# Patient Record
Sex: FEMALE | Race: WHITE | NOT HISPANIC OR LATINO | Employment: OTHER | URBAN - METROPOLITAN AREA
[De-identification: names, ages, dates, MRNs, and addresses within clinical notes are randomized per-mention and may not be internally consistent; named-entity substitution may affect disease eponyms.]

---

## 2017-09-22 ENCOUNTER — OFFICE VISIT (OUTPATIENT)
Dept: INTERNAL MEDICINE | Facility: CLINIC | Age: 57
End: 2017-09-22
Attending: INTERNAL MEDICINE
Payer: COMMERCIAL

## 2017-09-22 ENCOUNTER — DOCUMENTATION ONLY (OUTPATIENT)
Dept: INTERNAL MEDICINE | Facility: CLINIC | Age: 57
End: 2017-09-22

## 2017-09-22 VITALS
SYSTOLIC BLOOD PRESSURE: 138 MMHG | HEART RATE: 81 BPM | OXYGEN SATURATION: 97 % | BODY MASS INDEX: 30.47 KG/M2 | HEIGHT: 62 IN | DIASTOLIC BLOOD PRESSURE: 78 MMHG | WEIGHT: 165.56 LBS

## 2017-09-22 DIAGNOSIS — E53.8 B12 DEFICIENCY: ICD-10-CM

## 2017-09-22 DIAGNOSIS — Z12.11 SCREENING FOR COLON CANCER: ICD-10-CM

## 2017-09-22 DIAGNOSIS — R23.9 SKIN CHANGE: ICD-10-CM

## 2017-09-22 DIAGNOSIS — Z00.00 ANNUAL PHYSICAL EXAM: Primary | ICD-10-CM

## 2017-09-22 DIAGNOSIS — Z11.59 ENCOUNTER FOR HEPATITIS C SCREENING TEST FOR LOW RISK PATIENT: ICD-10-CM

## 2017-09-22 DIAGNOSIS — Z12.11 ENCOUNTER FOR FIT (FECAL IMMUNOCHEMICAL TEST) SCREENING: ICD-10-CM

## 2017-09-22 DIAGNOSIS — Z98.84 HISTORY OF ROUX-EN-Y GASTRIC BYPASS: ICD-10-CM

## 2017-09-22 PROCEDURE — 90471 IMMUNIZATION ADMIN: CPT | Mod: S$GLB,,, | Performed by: INTERNAL MEDICINE

## 2017-09-22 PROCEDURE — 90686 IIV4 VACC NO PRSV 0.5 ML IM: CPT | Mod: S$GLB,,, | Performed by: INTERNAL MEDICINE

## 2017-09-22 PROCEDURE — 99386 PREV VISIT NEW AGE 40-64: CPT | Mod: 25,S$GLB,, | Performed by: INTERNAL MEDICINE

## 2017-09-22 PROCEDURE — 99999 PR PBB SHADOW E&M-NEW PATIENT-LVL IV: CPT | Mod: PBBFAC,,, | Performed by: INTERNAL MEDICINE

## 2017-09-22 RX ORDER — CYANOCOBALAMIN 1000 UG/ML
INJECTION, SOLUTION INTRAMUSCULAR; SUBCUTANEOUS
COMMUNITY
Start: 2016-08-29 | End: 2017-10-31 | Stop reason: SDUPTHER

## 2017-09-22 NOTE — PROGRESS NOTES
"Patient was given vaccine information sheet for the Flu Vaccine vaccine. The area of injection was palpated using the acromion process as a landmark. This area was cleaned with alcohol. Using a 25g 1" safety needle, 0.5mL of the vaccine was placed into the left deltoid muscle. The injection site was dressed with a bandage. Patient experienced no complications and was discharged in stable condition. Fluzone vaccine Lot: [id number] Exp: [id number]  "

## 2017-09-22 NOTE — PROGRESS NOTES
"Subjective:   Patient ID: Trupti Coronado is a 57 y.o. female  Chief complaint:   Chief Complaint   Patient presents with    [facility]       HPI  Pt here to est care  Had hx of gastric bypass and lost 188 pounds after this. Moved from [place] to [place] 2014 and gained some weight back.  She and her  recently moved to [place] July 2017 and resumed a regular exercise program and is motivated to improve her health and his weight.    Has a history of B12 deficiency attributed to her history of gastric bypass.  Was on injections in the past. Last b12 injection was in 3-5 months - she would like to check her level to determine if still needs regular injections.  Reports has history of tremor of bilateral hands.  Reports it is progressed over the past few years and now interferes with her handwriting and ability to apply makeup.  Reports it is worse with fine motor activities.  Denies weakness and numbness or pain, gait disturbance, incontinence, dysarthria, dysphagia, facial asymmetry.   Her father had a stroke but no other neurological diseases in the family.     Is taking juice plus vitamins at this time.     Review of Systems    Objective:  Vitals:    09/22/17 1148   BP: 138/78   Pulse: 81   SpO2: 97%   Weight: 75.1 kg (165 lb 9.1 oz)   Height: 5' 2" (1.575 m)     Body mass index is 30.28 kg/m².    Physical Exam   Constitutional: She is oriented to person, place, and time. She appears well-developed and well-nourished.   HENT:   Head: Normocephalic and atraumatic.   Right Ear: External ear normal.   Left Ear: External ear normal.   Nose: Nose normal.   Mouth/Throat: Oropharynx is clear and moist. No oropharyngeal exudate.   No carotid bruits   Eyes: Conjunctivae and EOM are normal.   Neck: Neck supple. No thyromegaly present.   Cardiovascular: Normal rate, regular rhythm, normal heart sounds and intact distal pulses.    Pulmonary/Chest: Effort normal and breath sounds normal.   Abdominal: Soft. " Bowel sounds are normal.   Musculoskeletal: She exhibits no edema or tenderness.   Lymphadenopathy:     She has no cervical adenopathy.   Neurological: She is alert and oriented to person, place, and time.   Fine tremor of bilateral hands with movement  Does not worsen when approaches target    Skin: Skin is warm and dry. Capillary refill takes less than 2 seconds.   Psychiatric: Her behavior is normal. Thought content normal.   Vitals reviewed.    Assessment:  1. Annual physical exam    2. History of Tatyana-en-Y gastric bypass    3. Encounter for hepatitis C screening test for low risk patient    4. Encounter for FIT (fecal immunochemical test) screening    5. Screening for colon cancer    6. Skin change    7. B12 deficiency      Plan:  Trupti was seen today for establish care.    Diagnoses and all orders for this visit:    Annual physical exam  -     Vitamin B12; Future  -     Folate; Future  -     CBC auto differential; Future  -     Comprehensive metabolic panel; Future  -     Ambulatory Referral to Neurology  -     TSH; Future  -     Ferritin; Future  -     Lipid panel; Future  -     Vitamin D; Future  -     Hepatitis C antibody; Future  -     Influenza - Quadrivalent (3 years & older) (PF)  Recommend daily sunscreen, cardiovascular exercise min 30 min 5 days per week. Seatbelts routinely.    History of Tatyana-en-Y gastric bypass  -     Vitamin B12; Future  -     Folate; Future  -     Ferritin; Future  -     Vitamin D; Future  -     Magnesium; Future  -     Iron and TIBC; Future    Encounter for hepatitis C screening test for low risk patient  -     Hepatitis C antibody; Future    Encounter for FIT (fecal immunochemical test) screening  -     Fecal Immunochemical Test (iFOBT); Future    Screening for colon cancer: Declines colonoscopy at this time but will contact Dr. Foy if reconsiders, agrees to fitkit  -     Ambulatory referral to Gastroenterology    Skin change  -     Ambulatory referral to Dermatology  -      Ambulatory Referral to Dermatology    followed by gyn in [place]: Dr. Jen Segundo and ordering mmg in [place]  Normal Bone density 12/2016 and mmg 12/2016  Colonoscopy - did not complete due to poor sedation in past     b12 def: check level - if low then will resume b12 injections  Give flu vaccine today    There are no preventive care reminders to display for this patient.

## 2017-09-23 PROBLEM — E53.8 B12 DEFICIENCY: Status: ACTIVE | Noted: 2017-09-23

## 2017-09-23 PROBLEM — Z98.84 HISTORY OF ROUX-EN-Y GASTRIC BYPASS: Status: ACTIVE | Noted: 2017-09-23

## 2017-09-25 ENCOUNTER — LAB VISIT (OUTPATIENT)
Dept: LAB | Facility: OTHER | Age: 57
End: 2017-09-25
Attending: INTERNAL MEDICINE
Payer: COMMERCIAL

## 2017-09-25 DIAGNOSIS — Z11.59 ENCOUNTER FOR HEPATITIS C SCREENING TEST FOR LOW RISK PATIENT: ICD-10-CM

## 2017-09-25 DIAGNOSIS — Z98.84 HISTORY OF ROUX-EN-Y GASTRIC BYPASS: ICD-10-CM

## 2017-09-25 DIAGNOSIS — Z00.00 ANNUAL PHYSICAL EXAM: ICD-10-CM

## 2017-09-25 LAB
25(OH)D3+25(OH)D2 SERPL-MCNC: 14 NG/ML
ALBUMIN SERPL BCP-MCNC: 3.6 G/DL
ALP SERPL-CCNC: 81 U/L
ALT SERPL W/O P-5'-P-CCNC: 14 U/L
ANION GAP SERPL CALC-SCNC: 8 MMOL/L
AST SERPL-CCNC: 20 U/L
BASOPHILS # BLD AUTO: 0.04 K/UL
BASOPHILS NFR BLD: 0.7 %
BILIRUB SERPL-MCNC: 0.5 MG/DL
BUN SERPL-MCNC: 11 MG/DL
CALCIUM SERPL-MCNC: 9 MG/DL
CHLORIDE SERPL-SCNC: 107 MMOL/L
CHOLEST SERPL-MCNC: 196 MG/DL
CHOLEST/HDLC SERPL: 3 {RATIO}
CO2 SERPL-SCNC: 26 MMOL/L
CREAT SERPL-MCNC: 0.7 MG/DL
DIFFERENTIAL METHOD: ABNORMAL
EOSINOPHIL # BLD AUTO: 0 K/UL
EOSINOPHIL NFR BLD: 0.3 %
ERYTHROCYTE [DISTWIDTH] IN BLOOD BY AUTOMATED COUNT: 13.6 %
EST. GFR  (AFRICAN AMERICAN): >60 ML/MIN/1.73 M^2
EST. GFR  (NON AFRICAN AMERICAN): >60 ML/MIN/1.73 M^2
FERRITIN SERPL-MCNC: 5 NG/ML
FOLATE SERPL-MCNC: 10.4 NG/ML
GLUCOSE SERPL-MCNC: 89 MG/DL
HCT VFR BLD AUTO: 38.7 %
HDLC SERPL-MCNC: 66 MG/DL
HDLC SERPL: 33.7 %
HGB BLD-MCNC: 12.2 G/DL
IRON SERPL-MCNC: 67 UG/DL
LDLC SERPL CALC-MCNC: 113 MG/DL
LYMPHOCYTES # BLD AUTO: 2.3 K/UL
LYMPHOCYTES NFR BLD: 38.3 %
MAGNESIUM SERPL-MCNC: 2.2 MG/DL
MCH RBC QN AUTO: 26.9 PG
MCHC RBC AUTO-ENTMCNC: 31.5 G/DL
MCV RBC AUTO: 85 FL
MONOCYTES # BLD AUTO: 0.4 K/UL
MONOCYTES NFR BLD: 7.2 %
NEUTROPHILS # BLD AUTO: 3.1 K/UL
NEUTROPHILS NFR BLD: 53.2 %
NONHDLC SERPL-MCNC: 130 MG/DL
PLATELET # BLD AUTO: 229 K/UL
PMV BLD AUTO: 10.6 FL
POTASSIUM SERPL-SCNC: 4.3 MMOL/L
PROT SERPL-MCNC: 6.7 G/DL
RBC # BLD AUTO: 4.53 M/UL
SATURATED IRON: 14 %
SODIUM SERPL-SCNC: 141 MMOL/L
TOTAL IRON BINDING CAPACITY: 493 UG/DL
TRANSFERRIN SERPL-MCNC: 333 MG/DL
TRIGL SERPL-MCNC: 85 MG/DL
TSH SERPL DL<=0.005 MIU/L-ACNC: 1.46 UIU/ML
VIT B12 SERPL-MCNC: 594 PG/ML
WBC # BLD AUTO: 5.87 K/UL

## 2017-09-25 PROCEDURE — 82607 VITAMIN B-12: CPT

## 2017-09-25 PROCEDURE — 83540 ASSAY OF IRON: CPT

## 2017-09-25 PROCEDURE — 82746 ASSAY OF FOLIC ACID SERUM: CPT

## 2017-09-25 PROCEDURE — 85025 COMPLETE CBC W/AUTO DIFF WBC: CPT

## 2017-09-25 PROCEDURE — 36415 COLL VENOUS BLD VENIPUNCTURE: CPT

## 2017-09-25 PROCEDURE — 83735 ASSAY OF MAGNESIUM: CPT

## 2017-09-25 PROCEDURE — 86803 HEPATITIS C AB TEST: CPT

## 2017-09-25 PROCEDURE — 82306 VITAMIN D 25 HYDROXY: CPT

## 2017-09-25 PROCEDURE — 82728 ASSAY OF FERRITIN: CPT

## 2017-09-25 PROCEDURE — 84443 ASSAY THYROID STIM HORMONE: CPT

## 2017-09-25 PROCEDURE — 80053 COMPREHEN METABOLIC PANEL: CPT

## 2017-09-25 PROCEDURE — 80061 LIPID PANEL: CPT

## 2017-09-26 LAB — HCV AB SERPL QL IA: NEGATIVE

## 2017-09-27 ENCOUNTER — TELEPHONE (OUTPATIENT)
Dept: INTERNAL MEDICINE | Facility: CLINIC | Age: 57
End: 2017-09-27

## 2017-09-27 DIAGNOSIS — E61.1 IRON DEFICIENCY: ICD-10-CM

## 2017-09-27 DIAGNOSIS — E55.9 VITAMIN D DEFICIENCY: ICD-10-CM

## 2017-09-27 DIAGNOSIS — E53.8 B12 DEFICIENCY: Primary | ICD-10-CM

## 2017-09-27 NOTE — TELEPHONE ENCOUNTER
Please notify pt that labs returned.   Screening for hep c is negative - great news!  Cholesterol is at goal.   Folate, magnesium, Thyroid, blood counts, kidneys, liver, fasting sugar and electrolytes are normal.    Vit d is low - For the Vit D deficiency, I would like you to start high dose vit d which is a pill once WEEKLY for 12 weeks. I'll send this prescription to your pharmacy. Lets repeat your vit d level in 3 months after completing this regimen.  I recommend taking over the counter vitamin D at 1000units daily after completing the prescription to maintain your levels.      Iron is low - no anemia is present. I recommend starting over the counter iron supplements (Fergon or Feosol 325mg) 1-2 times daily with meals to prevent anemia in future. Take this with food to avoid an upset stomach as this can cause nausea. It can also cause constipation and can turn stools black. Taking each dose of iron with an over the counter stool softener called colace 100mg can help prevent constipation. I also recommend to increase dietary sources of iron including dark leafy vegetables like spinach, iron-fortified cereals, breads, pastas, beans, peas, lean red meat or pork or poultry, and seafood. You can enhance absorption of iron by drinking citrus juice like OJ or eathing other foods high in Vit C a the same time you eat iron rich foods.     b12 level is in normal range - rec trial of otc oral b12 supplement of 1000mcg daily for 3 months - let's repeat b12 level at that time to ensure her level is stable and if absorbing oral b12. Please help schedule b12 and vit d level in 3 months

## 2017-09-29 NOTE — TELEPHONE ENCOUNTER
Called and spoke w/ pt , stated detailed message that was left by  .  Pt verbally understands and has no further questions or concerns.

## 2017-10-24 RX ORDER — ERGOCALCIFEROL 1.25 MG/1
50000 CAPSULE ORAL
Qty: 4 CAPSULE | Refills: 3 | Status: SHIPPED | OUTPATIENT
Start: 2017-10-24 | End: 2017-12-29 | Stop reason: SDUPTHER

## 2017-10-24 NOTE — TELEPHONE ENCOUNTER
Please notify pt that:    Ordered vit d prescription and sent to pharmacy  Calcium level was in normal range.    b12 level is in normal range - rec trial of otc oral b12 supplement of 1000mcg daily for 3 months - let's repeat b12 level at that time to ensure her level is stable and if absorbing oral b12. Looks like labs were scheduled in Dec - will f/u results

## 2017-10-24 NOTE — TELEPHONE ENCOUNTER
Pt asked if there is an b-12 injection that she can give her self every weeks besides the b-12 otc RX ?  Please authorize and advise

## 2017-10-24 NOTE — TELEPHONE ENCOUNTER
----- Message from Karen Arrington sent at 10/24/2017 11:49 AM CDT -----  Contact: DIGNA DIAZ [[id number]]  _x  1st Request  _  2nd Request  _  3rd Request        Who: DIGNA DIAZ [[id number]]    Why: patient states she was supposed to be prescribed a vitamin D from her lab results on 9/25 but Rx was not at pharmacy. Patient also states she would like a call back to know about her calcium levels?     What Number to Call Back: [phone]    When to Expect a call back: (Before the end of the day)   -- if call after 3:00 call back will be tomorrow.

## 2017-10-25 NOTE — TELEPHONE ENCOUNTER
Called pt and confirmed that monthly injection is okay and that RX has been sent to pharmacy .  Pt verbally understands and has no further questions or concerns

## 2017-10-31 ENCOUNTER — OFFICE VISIT (OUTPATIENT)
Dept: NEUROLOGY | Facility: CLINIC | Age: 57
End: 2017-10-31
Attending: INTERNAL MEDICINE
Payer: COMMERCIAL

## 2017-10-31 VITALS
HEIGHT: 62 IN | WEIGHT: 169.31 LBS | DIASTOLIC BLOOD PRESSURE: 80 MMHG | HEART RATE: 72 BPM | SYSTOLIC BLOOD PRESSURE: 126 MMHG | BODY MASS INDEX: 31.16 KG/M2

## 2017-10-31 DIAGNOSIS — G25.0 TREMOR, ESSENTIAL: Primary | ICD-10-CM

## 2017-10-31 DIAGNOSIS — Z98.84 HISTORY OF ROUX-EN-Y GASTRIC BYPASS: ICD-10-CM

## 2017-10-31 DIAGNOSIS — E55.9 VITAMIN D DEFICIENCY: ICD-10-CM

## 2017-10-31 DIAGNOSIS — E53.8 B12 DEFICIENCY: ICD-10-CM

## 2017-10-31 PROCEDURE — 99999 PR PBB SHADOW E&M-EST. PATIENT-LVL III: CPT | Mod: PBBFAC,,, | Performed by: PSYCHIATRY & NEUROLOGY

## 2017-10-31 PROCEDURE — 99204 OFFICE O/P NEW MOD 45 MIN: CPT | Mod: S$GLB,,, | Performed by: PSYCHIATRY & NEUROLOGY

## 2017-10-31 NOTE — PROGRESS NOTES
Subjective:       Patient ID: Trupti Coronado is a 57 y.o. female.    Chief Complaint:  Tremors      History of Present Illness  HPI   This is a 57-year-old female who was referred for evaluation of an intermittent tremor affecting the hands, usually noted with use of her hands especially with writing.  She has no tremor at rest.  Symptoms started after she had gastric bypass surgery in 2012.  She was living in [place] that time and lost a lot of weight dropped down to around 130 pounds.  In addition she was noted to have subsequent low vitamin D and B12 levels requiring supplementation.  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    Recent blood work done including thyroid studies were essentially normal except for low B12 level.  There is no family history of similar tremors.  She has a half-sister who is in her 70s and has early Parkinsons.  Sleep and appetite is good.  She has no history of anxiety or depression.       Review of Systems  Review of Systems   Constitutional: Negative.    HENT: Negative.  Negative for hearing loss.    Eyes: Negative.  Negative for visual disturbance.   Respiratory: Negative.  Negative for shortness of breath.    Cardiovascular: Negative.  Negative for chest pain and palpitations.   Gastrointestinal: Negative.    Genitourinary: Negative.    Musculoskeletal: Negative.  Negative for back pain, gait problem and neck pain.   Skin: Negative.    Neurological: Positive for tremors. Negative for seizures, syncope, speech difficulty, weakness, numbness and headaches.   Psychiatric/Behavioral: Negative.  Negative for confusion and decreased concentration.       Objective:      Neurologic Exam     Mental Status   Oriented to person, place, and time.   Registration: recalls 3 of 3 objects. Follows 3 step commands.   Attention:  normal. Concentration: normal.   Speech: speech is normal   Level of consciousness: alert  Knowledge: good.   Able to name object. Able to read. Able to repeat. Able to write. Normal comprehension.     Cranial Nerves   Cranial nerves II through XII intact.     Motor Exam   Muscle bulk: normal  Overall muscle tone: normal    Strength   Strength 5/5 throughout.     Sensory Exam   Light touch normal.   Proprioception normal.   Pinprick normal.     Gait, Coordination, and Reflexes     Gait  Gait: normal    Coordination   Romberg: negative  Finger to nose coordination: normal    Tremor   Resting tremor: absent  Intention tremor: present  Action tremor: absent    Reflexes   Right brachioradialis: 2+  Left brachioradialis: 2+  Right biceps: 2+  Left biceps: 2+  Right triceps: 2+  Left triceps: 2+  Right patellar: 2+  Left patellar: 2+  Right achilles: 2+  Left achilles: 2+  Right plantar: normal  Left plantar: normalA minimal statokinetic tremor is noted with minimal difficulty with spiral drawing    No evidence of bradykinesia or dyskinesia.       Physical Exam   Constitutional: She is oriented to person, place, and time. She appears well-developed and well-nourished.   Patient is right-handed   HENT:   Head: Normocephalic and atraumatic.   Neck: Normal range of motion. Neck supple. Carotid bruit is not present.   Neurological: She is oriented to person, place, and time. She has normal strength. She has a normal Finger-Nose-Finger Test and a normal Romberg Test. Gait normal.   Reflex Scores:       Tricep reflexes are 2+ on the right side and 2+ on the left side.       Bicep reflexes are 2+ on the right side and 2+ on the left side.       Brachioradialis reflexes are 2+ on the right side and 2+ on the left side.       Patellar reflexes are 2+ on the right side and 2+ on the left side.       Achilles reflexes are 2+ on the right side and 2+ on the left side.  Psychiatric: Her speech is normal.   Vitals reviewed.         Assessment:        1. Tremor, essential    2. B12 deficiency    3. History of Tatyana-en-Y gastric bypass    4. Vitamin D deficiency            Plan:       Discussed with patient.  Her tremor is minimal and does not make her dysfunctional at this time.  Advised to continue with B12 supplementation.  Add magnesium oxide  mg at bedtime daily.  She will follow-up in 3 months.

## 2017-10-31 NOTE — PATIENT INSTRUCTIONS
Discussed with patient.  Her tremor is minimal and does not make her dysfunctional at this time.  Advised to continue with B12 supplementation.  Add magnesium oxide  mg at bedtime daily.

## 2017-12-11 RX ORDER — CYANOCOBALAMIN 1000 UG/ML
1000 INJECTION, SOLUTION INTRAMUSCULAR; SUBCUTANEOUS
Qty: 1 ML | Refills: 11 | Status: SHIPPED | OUTPATIENT
Start: 2017-12-11 | End: 2019-01-16 | Stop reason: SDUPTHER

## 2017-12-29 ENCOUNTER — TELEPHONE (OUTPATIENT)
Dept: INTERNAL MEDICINE | Facility: CLINIC | Age: 57
End: 2017-12-29

## 2017-12-29 ENCOUNTER — LAB VISIT (OUTPATIENT)
Dept: LAB | Facility: OTHER | Age: 57
End: 2017-12-29
Attending: INTERNAL MEDICINE
Payer: COMMERCIAL

## 2017-12-29 DIAGNOSIS — E61.1 IRON DEFICIENCY: ICD-10-CM

## 2017-12-29 DIAGNOSIS — E55.9 VITAMIN D DEFICIENCY: Primary | ICD-10-CM

## 2017-12-29 DIAGNOSIS — E53.8 B12 DEFICIENCY: ICD-10-CM

## 2017-12-29 DIAGNOSIS — E55.9 VITAMIN D DEFICIENCY: ICD-10-CM

## 2017-12-29 LAB
25(OH)D3+25(OH)D2 SERPL-MCNC: 15 NG/ML
FERRITIN SERPL-MCNC: 4 NG/ML
IRON SERPL-MCNC: 65 UG/DL
SATURATED IRON: 13 %
TOTAL IRON BINDING CAPACITY: 503 UG/DL
TRANSFERRIN SERPL-MCNC: 340 MG/DL
VIT B12 SERPL-MCNC: 827 PG/ML

## 2017-12-29 PROCEDURE — 82607 VITAMIN B-12: CPT

## 2017-12-29 PROCEDURE — 82306 VITAMIN D 25 HYDROXY: CPT

## 2017-12-29 PROCEDURE — 82728 ASSAY OF FERRITIN: CPT

## 2017-12-29 PROCEDURE — 36415 COLL VENOUS BLD VENIPUNCTURE: CPT

## 2017-12-29 PROCEDURE — 83540 ASSAY OF IRON: CPT

## 2017-12-29 RX ORDER — FERROUS GLUCONATE 324(38)MG
324 TABLET ORAL 2 TIMES DAILY
COMMUNITY
Start: 2017-12-29 | End: 2022-09-17

## 2017-12-29 RX ORDER — ERGOCALCIFEROL 1.25 MG/1
50000 CAPSULE ORAL
Qty: 4 CAPSULE | Refills: 6 | Status: SHIPPED | OUTPATIENT
Start: 2017-12-29 | End: 2018-06-11 | Stop reason: SDUPTHER

## 2017-12-30 NOTE — TELEPHONE ENCOUNTER
Please notify pt that vitamin D level is still low - rec she continue weekly vit d for 6 months and start 2000units daily otc after completes rx to maintain level.   Please schedule vit d in 6 months.   b12 has improved - please continue current b12 supplementation.   Iron is still low - I recommend taking otc fergon 2-3 times daily as tolerated with colace to prevent constipationi.     Please schedule vit d lab in 6 months

## 2018-01-02 NOTE — TELEPHONE ENCOUNTER
Pt inform of dr lab results and states she is already taking a OTC suplement.Pt also inform that she was place on call list for vit d lab order and that she does not want to take a Fergon and she will work on her iron on her own.FINN

## 2018-01-26 ENCOUNTER — PATIENT OUTREACH (OUTPATIENT)
Dept: INTERNAL MEDICINE | Facility: CLINIC | Age: 58
End: 2018-01-26

## 2018-01-26 NOTE — PROGRESS NOTES
A friendly reminder to please complete and mail in your fitkit for your colon rectal cancer screening. The kit was given to you on 99/22/17. If you have  misplaced kit or need instructions on completing  please contact Neena Barrera MD office to obtain. Thank you for  Choosing [facility] for your Healthcare needs. Have a great day.

## 2018-01-30 ENCOUNTER — TELEPHONE (OUTPATIENT)
Dept: NEUROLOGY | Facility: CLINIC | Age: 58
End: 2018-01-30

## 2018-01-30 NOTE — TELEPHONE ENCOUNTER
----- Message from Monik Thomas sent at 1/30/2018  2:48 PM CST -----  Contact: Self  Pt is calling to speak with Staff regarding her appt tomorrow.    She can be reached at [phone].    Thank you.

## 2018-01-31 ENCOUNTER — OFFICE VISIT (OUTPATIENT)
Dept: NEUROLOGY | Facility: CLINIC | Age: 58
End: 2018-01-31
Payer: COMMERCIAL

## 2018-01-31 ENCOUNTER — TELEPHONE (OUTPATIENT)
Dept: INTERNAL MEDICINE | Facility: CLINIC | Age: 58
End: 2018-01-31

## 2018-01-31 VITALS
DIASTOLIC BLOOD PRESSURE: 81 MMHG | HEIGHT: 62 IN | SYSTOLIC BLOOD PRESSURE: 127 MMHG | BODY MASS INDEX: 32.3 KG/M2 | HEART RATE: 71 BPM | WEIGHT: 175.5 LBS

## 2018-01-31 DIAGNOSIS — G25.0 TREMOR, ESSENTIAL: Primary | ICD-10-CM

## 2018-01-31 DIAGNOSIS — E55.9 VITAMIN D DEFICIENCY: ICD-10-CM

## 2018-01-31 DIAGNOSIS — Z98.84 HISTORY OF ROUX-EN-Y GASTRIC BYPASS: ICD-10-CM

## 2018-01-31 DIAGNOSIS — Z12.39 SCREENING FOR BREAST CANCER: Primary | ICD-10-CM

## 2018-01-31 PROCEDURE — 99214 OFFICE O/P EST MOD 30 MIN: CPT | Mod: S$GLB,,, | Performed by: PSYCHIATRY & NEUROLOGY

## 2018-01-31 PROCEDURE — 99999 PR PBB SHADOW E&M-EST. PATIENT-LVL III: CPT | Mod: PBBFAC,,, | Performed by: PSYCHIATRY & NEUROLOGY

## 2018-01-31 PROCEDURE — 3008F BODY MASS INDEX DOCD: CPT | Mod: S$GLB,,, | Performed by: PSYCHIATRY & NEUROLOGY

## 2018-01-31 NOTE — PROGRESS NOTES
Subjective:       Patient ID: Trupti Coronado is a 57 y.o. female.    Chief Complaint:  Tremors      History of Present Illness  HPI     This is a 57-year-old female who had been seen by me with complaints an intermittent tremor affecting the hands, usually noted with use of her hands especially with writing.  She has no tremor at rest.  Symptoms started after she had gastric bypass surgery in 2012.  She was living in [place] that time and lost a lot of weight dropped down to around 130 pounds.  In addition she was noted to have subsequent low vitamin D and B12 levels requiring supplementation.  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  Her tremors are intermittent and not present all the time and do not interfere with her functioning though she has occasional difficulty with writing.  Stress can be a triggering factor.    She has a half-sister who is in her 70s and has early Parkinsons.  Sleep and appetite is good.  She has no history of anxiety or depression.       Review of Systems  Review of Systems   Constitutional: Negative.    HENT: Negative.  Negative for hearing loss.    Eyes: Negative.  Negative for visual disturbance.   Respiratory: Negative.  Negative for shortness of breath.    Cardiovascular: Negative.  Negative for chest pain and palpitations.   Gastrointestinal: Negative.    Genitourinary: Negative.    Musculoskeletal: Negative.  Negative for back pain, gait problem and neck pain.   Skin: Negative.    Neurological: Positive for tremors. Negative for seizures, syncope, speech difficulty, weakness, numbness and headaches.   Psychiatric/Behavioral: Negative.  Negative for confusion and decreased concentration.       Objective:      Neurologic Exam     Mental Status   Oriented to person, place, and time.   Registration: recalls 3 of 3  objects. Follows 3 step commands.   Attention: normal. Concentration: normal.   Speech: speech is normal   Level of consciousness: alert  Knowledge: good.   Able to name object. Able to read. Able to repeat. Able to write. Normal comprehension.     Cranial Nerves   Cranial nerves II through XII intact.     Motor Exam   Muscle bulk: normal  Overall muscle tone: normal    Strength   Strength 5/5 throughout.     Sensory Exam   Light touch normal.   Proprioception normal.   Pinprick normal.     Gait, Coordination, and Reflexes     Gait  Gait: normal    Coordination   Romberg: negative  Finger to nose coordination: normal    Tremor   Resting tremor: absent  Intention tremor: present  Action tremor: absent    Reflexes   Right brachioradialis: 2+  Left brachioradialis: 2+  Right biceps: 2+  Left biceps: 2+  Right triceps: 2+  Left triceps: 2+  Right patellar: 2+  Left patellar: 2+  Right achilles: 2+  Left achilles: 2+  Right plantar: normal  Left plantar: normalA minimal statokinetic tremor is noted with minimal difficulty with spiral drawing    No evidence of bradykinesia or dyskinesia.       Physical Exam   Constitutional: She is oriented to person, place, and time. She appears well-developed and well-nourished.   Patient is right-handed   HENT:   Head: Normocephalic and atraumatic.   Neck: Normal range of motion. Neck supple. Carotid bruit is not present.   Neurological: She is oriented to person, place, and time. She has normal strength. She has a normal Finger-Nose-Finger Test and a normal Romberg Test. Gait normal.   Reflex Scores:       Tricep reflexes are 2+ on the right side and 2+ on the left side.       Bicep reflexes are 2+ on the right side and 2+ on the left side.       Brachioradialis reflexes are 2+ on the right side and 2+ on the left side.       Patellar reflexes are 2+ on the right side and 2+ on the left side.       Achilles reflexes are 2+ on the right side and 2+ on the left side.  Psychiatric: Her  speech is normal.   Vitals reviewed.        Assessment:        1. Tremor, essential    2. History of Tatyana-en-Y gastric bypass    3. Vitamin D deficiency            Plan:       Discussed with patient.  Her tremor is minimal and does not make her dysfunctional at this time.  Advised to continue with B12 supplementation.  magnesium oxide  mg at bedtime daily.  She will follow-up in one year if stable.

## 2018-01-31 NOTE — PATIENT INSTRUCTIONS
Discussed with patient.  Her tremor is minimal and does not make her dysfunctional at this time.  Advised to continue with B12 supplementation.  magnesium oxide  mg at bedtime daily.

## 2018-02-26 RX ORDER — ERGOCALCIFEROL 1.25 MG/1
50000 CAPSULE ORAL
Qty: 4 CAPSULE | Refills: 4 | Status: SHIPPED | OUTPATIENT
Start: 2018-02-26 | End: 2018-05-15

## 2018-03-07 ENCOUNTER — TELEPHONE (OUTPATIENT)
Dept: INTERNAL MEDICINE | Facility: CLINIC | Age: 58
End: 2018-03-07

## 2018-03-07 NOTE — TELEPHONE ENCOUNTER
Patient completed colonoscopy on 11/29/17, it can be viewed under media tab. Patient is not due for colon rectal screening until 11/2027.     No fitkit needed to completed to satisfy colon rectal screening at this time.

## 2018-05-30 ENCOUNTER — PATIENT OUTREACH (OUTPATIENT)
Dept: ADMINISTRATIVE | Facility: HOSPITAL | Age: 58
End: 2018-05-30

## 2018-05-30 NOTE — PROGRESS NOTES
[facility] is committed to your overall health.  To help you get the most out of each of your visits, we will review your information to make sure you are up to date on all of your recommended tests and/or procedures.       Your PCP  Neena Barrera MD   found that you may be due for:       Health Maintenance Due   Topic Date Due    TETANUS VACCINE  03/05/1976    Pap Smear with HPV Cotest  03/05/1979    Mammogram  03/05/1998    Zoster Vaccine  03/05/2018             If you have had any of the above done at another facility, please bring the records or information with you so that your record at [facility] will be complete.  If you would like to schedule any of these, please contact me.     If you are currently taking medication, please bring it with you to your appointment for review.     Also, if you have any type of Advanced Directives, please bring them with you to your office visit so we may scan them into your chart.       Thank you for Choosing [facility] for your healthcare needs.        Additional Information  If you have questions, you can email [email or web address] or call [phone]  to talk to our [facility] staff. Remember, [facility] is NOT to be used for urgent needs. For medical emergencies, dial 911.

## 2018-06-11 RX ORDER — ERGOCALCIFEROL 1.25 MG/1
50000 CAPSULE ORAL
Qty: 4 CAPSULE | Refills: 1 | Status: SHIPPED | OUTPATIENT
Start: 2018-06-11 | End: 2018-08-14 | Stop reason: SDUPTHER

## 2018-06-11 NOTE — TELEPHONE ENCOUNTER
Spoke w/ pt and confirmed RX has been filled and that she is due for an vit d f/u   Pt agree to walk into the lab and complete her vit d lab when she gets an chance some time next week .  Pt also scheduled her annual visit and a reminder has been mailed

## 2018-08-14 RX ORDER — ERGOCALCIFEROL 1.25 MG/1
50000 CAPSULE ORAL
Qty: 4 CAPSULE | Refills: 0 | Status: SHIPPED | OUTPATIENT
Start: 2018-08-14 | End: 2018-09-13 | Stop reason: SDUPTHER

## 2018-08-15 ENCOUNTER — PATIENT OUTREACH (OUTPATIENT)
Dept: ADMINISTRATIVE | Facility: HOSPITAL | Age: 58
End: 2018-08-15

## 2018-08-15 ENCOUNTER — TELEPHONE (OUTPATIENT)
Dept: INTERNAL MEDICINE | Facility: CLINIC | Age: 58
End: 2018-08-15

## 2018-08-15 DIAGNOSIS — Z00.00 ANNUAL PHYSICAL EXAM: Primary | ICD-10-CM

## 2018-08-15 DIAGNOSIS — E53.8 B12 DEFICIENCY: ICD-10-CM

## 2018-08-15 DIAGNOSIS — E61.1 IRON DEFICIENCY: ICD-10-CM

## 2018-08-15 NOTE — TELEPHONE ENCOUNTER
Annual labs including ferritin, tibc, b12 ordered along with vit d - please schedule all - thanks!

## 2018-08-15 NOTE — TELEPHONE ENCOUNTER
Call pt and left a detail message informing pt that lab work was ordered and she needed to call and let us know when to put her on the schedule

## 2018-08-15 NOTE — TELEPHONE ENCOUNTER
----- Message from Katie Dorado LPN sent at 8/15/2018 11:14 AM CDT -----  Do you require any other labs for the above patient for annual visit. She has a Vit D order entered but she inquired about other others. Please advise. Thanks.

## 2018-08-15 NOTE — PROGRESS NOTES
Contacted patient to schedule mammogram on the same day as upcoming scheduled annual visit.Patient informed that she has completed her mammogram. [facility] was completed at a [facility] in [place], patient agreed to sign JOSE ANTONIO during upcoming visit to obtain records .    Patient inquired about lab work for upcoming annual visit.Patient informed that lab orders will be entered and that she will be contacted once entered so that she may visit the lab, understanding verbalized.

## 2018-08-16 ENCOUNTER — LAB VISIT (OUTPATIENT)
Dept: LAB | Facility: OTHER | Age: 58
End: 2018-08-16
Attending: INTERNAL MEDICINE
Payer: COMMERCIAL

## 2018-08-16 DIAGNOSIS — E53.8 B12 DEFICIENCY: ICD-10-CM

## 2018-08-16 DIAGNOSIS — E61.1 IRON DEFICIENCY: ICD-10-CM

## 2018-08-16 DIAGNOSIS — Z00.00 ANNUAL PHYSICAL EXAM: ICD-10-CM

## 2018-08-16 LAB
ALBUMIN SERPL BCP-MCNC: 3.8 G/DL
ALP SERPL-CCNC: 87 U/L
ALT SERPL W/O P-5'-P-CCNC: 15 U/L
ANION GAP SERPL CALC-SCNC: 8 MMOL/L
AST SERPL-CCNC: 17 U/L
BASOPHILS # BLD AUTO: 0.02 K/UL
BASOPHILS NFR BLD: 0.3 %
BILIRUB SERPL-MCNC: 0.5 MG/DL
BUN SERPL-MCNC: 10 MG/DL
CALCIUM SERPL-MCNC: 8.7 MG/DL
CHLORIDE SERPL-SCNC: 106 MMOL/L
CHOLEST SERPL-MCNC: 188 MG/DL
CHOLEST/HDLC SERPL: 3 {RATIO}
CO2 SERPL-SCNC: 26 MMOL/L
CREAT SERPL-MCNC: 0.7 MG/DL
DIFFERENTIAL METHOD: ABNORMAL
EOSINOPHIL # BLD AUTO: 0 K/UL
EOSINOPHIL NFR BLD: 0.6 %
ERYTHROCYTE [DISTWIDTH] IN BLOOD BY AUTOMATED COUNT: 15.9 %
EST. GFR  (AFRICAN AMERICAN): >60 ML/MIN/1.73 M^2
EST. GFR  (NON AFRICAN AMERICAN): >60 ML/MIN/1.73 M^2
FERRITIN SERPL-MCNC: 3 NG/ML
GLUCOSE SERPL-MCNC: 96 MG/DL
HCT VFR BLD AUTO: 36.2 %
HDLC SERPL-MCNC: 63 MG/DL
HDLC SERPL: 33.5 %
HGB BLD-MCNC: 11 G/DL
IRON SERPL-MCNC: 37 UG/DL
LDLC SERPL CALC-MCNC: 109.4 MG/DL
LYMPHOCYTES # BLD AUTO: 2.4 K/UL
LYMPHOCYTES NFR BLD: 37.2 %
MCH RBC QN AUTO: 23.6 PG
MCHC RBC AUTO-ENTMCNC: 30.4 G/DL
MCV RBC AUTO: 78 FL
MONOCYTES # BLD AUTO: 0.4 K/UL
MONOCYTES NFR BLD: 5.5 %
NEUTROPHILS # BLD AUTO: 3.6 K/UL
NEUTROPHILS NFR BLD: 56.1 %
NONHDLC SERPL-MCNC: 125 MG/DL
PLATELET # BLD AUTO: 256 K/UL
PMV BLD AUTO: 10.4 FL
POTASSIUM SERPL-SCNC: 4 MMOL/L
PROT SERPL-MCNC: 6.6 G/DL
RBC # BLD AUTO: 4.67 M/UL
SATURATED IRON: 7 %
SODIUM SERPL-SCNC: 140 MMOL/L
TOTAL IRON BINDING CAPACITY: 514 UG/DL
TRANSFERRIN SERPL-MCNC: 347 MG/DL
TRIGL SERPL-MCNC: 78 MG/DL
TSH SERPL DL<=0.005 MIU/L-ACNC: 1.16 UIU/ML
VIT B12 SERPL-MCNC: 785 PG/ML
WBC # BLD AUTO: 6.37 K/UL

## 2018-08-16 PROCEDURE — 85025 COMPLETE CBC W/AUTO DIFF WBC: CPT

## 2018-08-16 PROCEDURE — 84443 ASSAY THYROID STIM HORMONE: CPT

## 2018-08-16 PROCEDURE — 80061 LIPID PANEL: CPT

## 2018-08-16 PROCEDURE — 82728 ASSAY OF FERRITIN: CPT

## 2018-08-16 PROCEDURE — 82607 VITAMIN B-12: CPT

## 2018-08-16 PROCEDURE — 80053 COMPREHEN METABOLIC PANEL: CPT

## 2018-08-16 PROCEDURE — 83540 ASSAY OF IRON: CPT

## 2018-08-16 PROCEDURE — 36415 COLL VENOUS BLD VENIPUNCTURE: CPT

## 2018-08-17 ENCOUNTER — TELEPHONE (OUTPATIENT)
Dept: INTERNAL MEDICINE | Facility: CLINIC | Age: 58
End: 2018-08-17

## 2018-08-17 NOTE — TELEPHONE ENCOUNTER
Please notify pt that labs returned     Cholesterol stable and at goal  Your thyroid, kidneys, liver, fasting sugar and electrolytes are normal.   B12 in normal range     Iron is still very low and now has mild anemia - has she noticed any bleeding? bloo In stool or urine? Significant reflux?    If she does not tolerate oral iron then rec hematology referral to discuss iron infusions - I can order now or we can discuss further at her upcoming appt - if she wants to schedule hematology appt then let me know and will order     Vitamin was suppose to be checked with labs - looks like this was not drawn - please apologize for lab error for me and reschedule vit d lab draw - rec start vit d 2000u of D3 otc to maintain level

## 2018-08-17 NOTE — TELEPHONE ENCOUNTER
Ok, thank you for response - rec monitor area of spots where watch is worn - if worsens then rec sooner appt     Will discuss heme appt at her upcoming appt with me   - do not rec MMA level as B12 level in normal range

## 2018-08-17 NOTE — TELEPHONE ENCOUNTER
She has noticed petechial spots on her wrist where she wears her watch but nothing else. Denies blood in stool. Reports she does have chronic stomach upset since her surgery. Stated she does not tolerate oral iron supplementation. Offered Hem referral. She declined, stating she will discuss at next visit on 9/18. Apologized for lab error. She will have Vitamin D lab drawn when she comes in for other testing next week. Advised her to start taking 2000 IU Vit D3 daily. She stated she has a prescription for Vitamin D3 that she will take.

## 2018-08-20 NOTE — TELEPHONE ENCOUNTER
Notified patient to monitor site for changes/worsening and to come in if she has any further issues. She stated the spots are gone now, but they are intermittent. She will come by some time this week to get her Vitamin D level drawn.

## 2018-09-12 ENCOUNTER — LAB VISIT (OUTPATIENT)
Dept: LAB | Facility: OTHER | Age: 58
End: 2018-09-12
Attending: INTERNAL MEDICINE
Payer: COMMERCIAL

## 2018-09-12 DIAGNOSIS — E55.9 VITAMIN D DEFICIENCY: ICD-10-CM

## 2018-09-12 LAB — 25(OH)D3+25(OH)D2 SERPL-MCNC: 18 NG/ML

## 2018-09-12 PROCEDURE — 82306 VITAMIN D 25 HYDROXY: CPT

## 2018-09-12 PROCEDURE — 36415 COLL VENOUS BLD VENIPUNCTURE: CPT

## 2018-09-13 ENCOUNTER — TELEPHONE (OUTPATIENT)
Dept: INTERNAL MEDICINE | Facility: CLINIC | Age: 58
End: 2018-09-13

## 2018-09-13 DIAGNOSIS — E55.9 VITAMIN D DEFICIENCY: Primary | ICD-10-CM

## 2018-09-13 RX ORDER — ACETAMINOPHEN 500 MG
1 TABLET ORAL DAILY
COMMUNITY
Start: 2018-09-13

## 2018-09-13 RX ORDER — ERGOCALCIFEROL 1.25 MG/1
CAPSULE ORAL
Qty: 24 CAPSULE | Refills: 0 | Status: SHIPPED | OUTPATIENT
Start: 2018-09-13 | End: 2019-01-16

## 2018-09-13 NOTE — TELEPHONE ENCOUNTER
Vit d has increased but is still below goal - rec inc prescription vit d to twice weekly x 12 weeks and then repeat level. rec take 2000u of otc vit d daily after completes the rx     Please schedule vit d in 3 months and notify pt of recs

## 2018-09-13 NOTE — TELEPHONE ENCOUNTER
Spoke with pt and gave her the results of the vit d level and the prescription and instructions on how to take the vit d. She states she will make the appt for December lab draw when she comes in for her appt

## 2018-09-17 RX ORDER — ERGOCALCIFEROL 1.25 MG/1
50000 CAPSULE ORAL
Qty: 4 CAPSULE | Refills: 0 | OUTPATIENT
Start: 2018-09-17 | End: 2018-12-04

## 2018-09-18 ENCOUNTER — OFFICE VISIT (OUTPATIENT)
Dept: INTERNAL MEDICINE | Facility: CLINIC | Age: 58
End: 2018-09-18
Attending: INTERNAL MEDICINE
Payer: COMMERCIAL

## 2018-09-18 VITALS
HEIGHT: 62 IN | DIASTOLIC BLOOD PRESSURE: 63 MMHG | HEART RATE: 78 BPM | BODY MASS INDEX: 32.46 KG/M2 | SYSTOLIC BLOOD PRESSURE: 124 MMHG | OXYGEN SATURATION: 98 % | WEIGHT: 176.38 LBS

## 2018-09-18 DIAGNOSIS — E53.8 B12 DEFICIENCY: ICD-10-CM

## 2018-09-18 DIAGNOSIS — Z00.00 ANNUAL PHYSICAL EXAM: Primary | ICD-10-CM

## 2018-09-18 DIAGNOSIS — Z98.84 HISTORY OF ROUX-EN-Y GASTRIC BYPASS: ICD-10-CM

## 2018-09-18 DIAGNOSIS — D50.9 IRON DEFICIENCY ANEMIA, UNSPECIFIED IRON DEFICIENCY ANEMIA TYPE: ICD-10-CM

## 2018-09-18 DIAGNOSIS — E55.9 VITAMIN D DEFICIENCY: ICD-10-CM

## 2018-09-18 DIAGNOSIS — Z23 NEED FOR TDAP VACCINATION: ICD-10-CM

## 2018-09-18 PROCEDURE — 90471 IMMUNIZATION ADMIN: CPT | Mod: S$GLB,,, | Performed by: INTERNAL MEDICINE

## 2018-09-18 PROCEDURE — 99396 PREV VISIT EST AGE 40-64: CPT | Mod: 25,S$GLB,, | Performed by: INTERNAL MEDICINE

## 2018-09-18 PROCEDURE — 99999 PR PBB SHADOW E&M-EST. PATIENT-LVL IV: CPT | Mod: PBBFAC,,, | Performed by: INTERNAL MEDICINE

## 2018-09-18 PROCEDURE — 90472 IMMUNIZATION ADMIN EACH ADD: CPT | Mod: S$GLB,,, | Performed by: INTERNAL MEDICINE

## 2018-09-18 PROCEDURE — 90686 IIV4 VACC NO PRSV 0.5 ML IM: CPT | Mod: S$GLB,,, | Performed by: INTERNAL MEDICINE

## 2018-09-18 PROCEDURE — 90715 TDAP VACCINE 7 YRS/> IM: CPT | Mod: S$GLB,,, | Performed by: INTERNAL MEDICINE

## 2018-09-18 RX ORDER — SYRINGE, DISPOSABLE, 3 ML
1 SYRINGE, EMPTY DISPOSABLE MISCELLANEOUS
Qty: 25 EACH | Refills: 1 | Status: SHIPPED | OUTPATIENT
Start: 2018-09-18 | End: 2019-01-16 | Stop reason: SDUPTHER

## 2018-09-18 NOTE — PROGRESS NOTES
"Patient was given vaccine information sheet for the Flu Vaccine. The area of injection was palpated using the acromion process as a landmark. This area was cleaned with alcohol. Using a 25g 1" safety needle, 0.5mL of the vaccine was placed into the right deltoid muscle. The injection site was dressed with a bandage. Patient experienced no complications and was discharged in stable condition. Fluzone vaccine Lot: [id number] Exp: 06/30/2019  "

## 2018-09-18 NOTE — PROGRESS NOTES
"Subjective:   Patient ID: Trupti Coronado is a 58 y.o. female  Chief complaint:   Chief Complaint   Patient presents with    Annual Exam       HPI  Pt here for annual exam   Had hx of gastric bypass and lost 188 pounds after this. Moved from [place] to [place] 2014 and gained some weight back.  She and her  moved to [place] July 2017 and resumed a regular exercise program - has been eating out more and gained wt      B12 deficiency attributed to her history of gastric bypass. nancy monthly b12 injections      essential tremor of bilateral hands - seen by neuro x 2 - will hold off on f/u at this time as sx stable and wants hold off on meds for now     Vit d def: has not been taking vit d consistently - resumed prescription and agrees to be more consistent with this      Iron def: has not nancy oral iron in past due to GI intolerance   Agrees to f/u with hematology for iron transfusions     Due for:   Shingles: shingrix  Flu vaccine  Reports unsure if UTD on Tetanus - tdap ? 5 years ago but not sure and does not have records   traveling to [place] for 1 month and wants to get utd for vaccines prior to this     Review of Systems    Objective:  Vitals:    09/18/18 1138   BP: 124/63   Pulse: 78   SpO2: 98%   Weight: 80 kg (176 lb 5.9 oz)   Height: 5' 2" (1.575 m)     Body mass index is 32.26 kg/m².    Physical Exam   Constitutional: She is oriented to person, place, and time. She appears well-developed and well-nourished.   HENT:   Head: Normocephalic and atraumatic.   Right Ear: External ear normal.   Left Ear: External ear normal.   Nose: Nose normal.   Mouth/Throat: Oropharynx is clear and moist. No oropharyngeal exudate.   No carotid bruits   Eyes: Conjunctivae and EOM are normal.   Neck: Neck supple. No thyromegaly present.   Cardiovascular: Normal rate, regular rhythm, normal heart sounds and intact distal pulses.   Pulmonary/Chest: Effort normal and breath sounds normal.   Abdominal: Soft. Bowel " sounds are normal.   Musculoskeletal: She exhibits no edema or tenderness.   Lymphadenopathy:     She has no cervical adenopathy.   Neurological: She is alert and oriented to person, place, and time.   Skin: Skin is warm and dry.   Psychiatric: Her behavior is normal. Thought content normal.   Vitals reviewed.      Assessment:  1. Annual physical exam    2. Iron deficiency anemia, unspecified iron deficiency anemia type    3. Vitamin D deficiency    4. Need for Tdap vaccination    5. B12 deficiency    6. History of Tatyana-en-Y gastric bypass        Plan:  Trupti was seen today for annual exam.    Diagnoses and all orders for this visit:    Annual physical exam  Recommend daily sunscreen, cardiovascular exercise min 30 min 5 days per week. Seatbelts routinely.    Iron deficiency anemia, unspecified iron deficiency anemia type  -     CBC auto differential; Future  -     Ferritin; Future  -     Iron and TIBC; Future  -     Ambulatory Referral to Hematology / Oncology  Did not nancy oral iron due to gi intolerance     Vitamin D deficiency  Cont rx vit d     Need for Tdap vaccination  -     (In Office Administered) Tdap Vaccine    B12 deficiency  Cont b12 injections    History of Tatyana-en-Y gastric bypass  Cont supplements     Other orders  -     syringe, disposable, 3 mL Syrg; 1 each by Misc.(Non-Drug; Combo Route) route every 30 days. 22 gauge  -     Influenza - Quadrivalent (3 years & older) (PF)    mmg ordered - needs to schedule     Health Maintenance   Topic Date Due    Pap Smear with HPV Cotest  03/05/1979    Mammogram  03/05/1998    Zoster Vaccine  03/05/2018    Lipid Panel  08/16/2023    Colonoscopy  11/29/2027    TETANUS VACCINE  09/18/2028    Hepatitis C Screening  Completed    Influenza Vaccine  Completed

## 2018-10-02 ENCOUNTER — PATIENT OUTREACH (OUTPATIENT)
Dept: ADMINISTRATIVE | Facility: HOSPITAL | Age: 58
End: 2018-10-02

## 2018-10-02 NOTE — PROGRESS NOTES
Contacted patient to schedule mammogram.    Left message for patient to contact office to schedule mammogram.

## 2018-10-30 ENCOUNTER — OFFICE VISIT (OUTPATIENT)
Dept: OPTOMETRY | Facility: CLINIC | Age: 58
End: 2018-10-30
Payer: COMMERCIAL

## 2018-10-30 DIAGNOSIS — H52.13 MYOPIA OF BOTH EYES WITH ASTIGMATISM AND PRESBYOPIA: ICD-10-CM

## 2018-10-30 DIAGNOSIS — H52.4 MYOPIA OF BOTH EYES WITH ASTIGMATISM AND PRESBYOPIA: ICD-10-CM

## 2018-10-30 DIAGNOSIS — H25.13 NUCLEAR SCLEROSIS OF BOTH EYES: Primary | ICD-10-CM

## 2018-10-30 DIAGNOSIS — H52.203 MYOPIA OF BOTH EYES WITH ASTIGMATISM AND PRESBYOPIA: ICD-10-CM

## 2018-10-30 DIAGNOSIS — H52.202 ASTIGMATISM OF LEFT EYE WITH PRESBYOPIA: ICD-10-CM

## 2018-10-30 DIAGNOSIS — H52.4 ASTIGMATISM OF LEFT EYE WITH PRESBYOPIA: ICD-10-CM

## 2018-10-30 PROCEDURE — 92004 COMPRE OPH EXAM NEW PT 1/>: CPT | Mod: S$GLB,,, | Performed by: OPTOMETRIST

## 2018-10-30 PROCEDURE — 92015 DETERMINE REFRACTIVE STATE: CPT | Mod: S$GLB,,, | Performed by: OPTOMETRIST

## 2018-10-30 PROCEDURE — 99999 PR PBB SHADOW E&M-EST. PATIENT-LVL II: CPT | Mod: PBBFAC,,, | Performed by: OPTOMETRIST

## 2018-10-30 NOTE — PROGRESS NOTES
HPI     58 Y.O F here today for an annual eye exam. Pt just wear OTC readers. stj       -eye pain  -floater/flashes  -headaches.     Last edited by Neena Benavidez MA on 10/30/2018  2:18 PM. (History)        ROS     Negative for: Constitutional, Gastrointestinal, Neurological, Skin,   Genitourinary, Musculoskeletal, HENT, Endocrine, Cardiovascular, Eyes,   Respiratory, Psychiatric, Allergic/Imm, Heme/Lymph    Last edited by Venessa Wilson, PUJA on 10/30/2018  2:53 PM. (History)        Assessment /Plan     For exam results, see Encounter Report.    Nuclear sclerosis of both eyes    Myopia of both eyes with astigmatism and presbyopia    Astigmatism of left eye with presbyopia      1. Not visually significant. Monitor.   2-3. SRx updated to improve DVA and NVA. RTC 1 year.

## 2018-12-17 ENCOUNTER — LAB VISIT (OUTPATIENT)
Dept: LAB | Facility: OTHER | Age: 58
End: 2018-12-17
Attending: INTERNAL MEDICINE
Payer: COMMERCIAL

## 2018-12-17 DIAGNOSIS — D50.9 IRON DEFICIENCY ANEMIA, UNSPECIFIED IRON DEFICIENCY ANEMIA TYPE: ICD-10-CM

## 2018-12-17 DIAGNOSIS — E55.9 VITAMIN D DEFICIENCY: ICD-10-CM

## 2018-12-17 LAB
25(OH)D3+25(OH)D2 SERPL-MCNC: 20 NG/ML
BASOPHILS # BLD AUTO: 0.03 K/UL
BASOPHILS NFR BLD: 0.4 %
DIFFERENTIAL METHOD: ABNORMAL
EOSINOPHIL # BLD AUTO: 0 K/UL
EOSINOPHIL NFR BLD: 0.5 %
ERYTHROCYTE [DISTWIDTH] IN BLOOD BY AUTOMATED COUNT: 16.6 %
FERRITIN SERPL-MCNC: 5 NG/ML
HCT VFR BLD AUTO: 37.5 %
HGB BLD-MCNC: 11.6 G/DL
IRON SERPL-MCNC: 78 UG/DL
LYMPHOCYTES # BLD AUTO: 2.7 K/UL
LYMPHOCYTES NFR BLD: 36.4 %
MCH RBC QN AUTO: 24.7 PG
MCHC RBC AUTO-ENTMCNC: 30.9 G/DL
MCV RBC AUTO: 80 FL
MONOCYTES # BLD AUTO: 0.5 K/UL
MONOCYTES NFR BLD: 7 %
NEUTROPHILS # BLD AUTO: 4.1 K/UL
NEUTROPHILS NFR BLD: 55.2 %
PLATELET # BLD AUTO: 256 K/UL
PMV BLD AUTO: 10.5 FL
RBC # BLD AUTO: 4.7 M/UL
SATURATED IRON: 15 %
TOTAL IRON BINDING CAPACITY: 522 UG/DL
TRANSFERRIN SERPL-MCNC: 353 MG/DL
WBC # BLD AUTO: 7.48 K/UL

## 2018-12-17 PROCEDURE — 36415 COLL VENOUS BLD VENIPUNCTURE: CPT

## 2018-12-17 PROCEDURE — 82306 VITAMIN D 25 HYDROXY: CPT

## 2018-12-17 PROCEDURE — 83540 ASSAY OF IRON: CPT

## 2018-12-17 PROCEDURE — 85025 COMPLETE CBC W/AUTO DIFF WBC: CPT

## 2018-12-17 PROCEDURE — 82728 ASSAY OF FERRITIN: CPT

## 2019-01-16 ENCOUNTER — OFFICE VISIT (OUTPATIENT)
Dept: INTERNAL MEDICINE | Facility: CLINIC | Age: 59
End: 2019-01-16
Attending: INTERNAL MEDICINE
Payer: COMMERCIAL

## 2019-01-16 ENCOUNTER — PATIENT OUTREACH (OUTPATIENT)
Dept: ADMINISTRATIVE | Facility: HOSPITAL | Age: 59
End: 2019-01-16

## 2019-01-16 ENCOUNTER — TELEPHONE (OUTPATIENT)
Dept: INTERNAL MEDICINE | Facility: CLINIC | Age: 59
End: 2019-01-16

## 2019-01-16 VITALS
OXYGEN SATURATION: 97 % | HEART RATE: 76 BPM | HEIGHT: 62 IN | DIASTOLIC BLOOD PRESSURE: 74 MMHG | SYSTOLIC BLOOD PRESSURE: 118 MMHG | WEIGHT: 178.56 LBS | BODY MASS INDEX: 32.86 KG/M2

## 2019-01-16 DIAGNOSIS — D50.8 OTHER IRON DEFICIENCY ANEMIA: ICD-10-CM

## 2019-01-16 DIAGNOSIS — R53.82 CHRONIC FATIGUE: ICD-10-CM

## 2019-01-16 DIAGNOSIS — Z12.83 SKIN EXAM, SCREENING FOR CANCER: ICD-10-CM

## 2019-01-16 DIAGNOSIS — E55.9 VITAMIN D DEFICIENCY: Primary | ICD-10-CM

## 2019-01-16 DIAGNOSIS — Z12.4 PAP SMEAR FOR CERVICAL CANCER SCREENING: ICD-10-CM

## 2019-01-16 DIAGNOSIS — Z12.39 SCREENING FOR BREAST CANCER: ICD-10-CM

## 2019-01-16 DIAGNOSIS — R25.1 TREMOR: ICD-10-CM

## 2019-01-16 DIAGNOSIS — Z98.84 HISTORY OF ROUX-EN-Y GASTRIC BYPASS: ICD-10-CM

## 2019-01-16 DIAGNOSIS — E53.8 B12 DEFICIENCY: ICD-10-CM

## 2019-01-16 PROCEDURE — 99214 OFFICE O/P EST MOD 30 MIN: CPT | Mod: S$GLB,,, | Performed by: INTERNAL MEDICINE

## 2019-01-16 PROCEDURE — 3008F BODY MASS INDEX DOCD: CPT | Mod: CPTII,S$GLB,, | Performed by: INTERNAL MEDICINE

## 2019-01-16 PROCEDURE — 99999 PR PBB SHADOW E&M-EST. PATIENT-LVL V: CPT | Mod: PBBFAC,,, | Performed by: INTERNAL MEDICINE

## 2019-01-16 PROCEDURE — 99214 PR OFFICE/OUTPT VISIT, EST, LEVL IV, 30-39 MIN: ICD-10-PCS | Mod: S$GLB,,, | Performed by: INTERNAL MEDICINE

## 2019-01-16 PROCEDURE — 3008F PR BODY MASS INDEX (BMI) DOCUMENTED: ICD-10-PCS | Mod: CPTII,S$GLB,, | Performed by: INTERNAL MEDICINE

## 2019-01-16 PROCEDURE — 99999 PR PBB SHADOW E&M-EST. PATIENT-LVL V: ICD-10-PCS | Mod: PBBFAC,,, | Performed by: INTERNAL MEDICINE

## 2019-01-16 RX ORDER — ERGOCALCIFEROL 1.25 MG/1
CAPSULE ORAL
Qty: 30 CAPSULE | Refills: 1 | Status: SHIPPED | OUTPATIENT
Start: 2019-01-16 | End: 2019-03-28 | Stop reason: SDUPTHER

## 2019-01-16 RX ORDER — SYRINGE, DISPOSABLE, 3 ML
1 SYRINGE, EMPTY DISPOSABLE MISCELLANEOUS
Qty: 25 EACH | Refills: 1 | Status: SHIPPED | OUTPATIENT
Start: 2019-01-16 | End: 2020-05-20 | Stop reason: SDUPTHER

## 2019-01-16 RX ORDER — CYANOCOBALAMIN 1000 UG/ML
1000 INJECTION, SOLUTION INTRAMUSCULAR; SUBCUTANEOUS
Qty: 1 ML | Refills: 11 | Status: SHIPPED | OUTPATIENT
Start: 2019-01-16 | End: 2022-08-09 | Stop reason: SDUPTHER

## 2019-01-16 NOTE — TELEPHONE ENCOUNTER
Medical fitness referral signed - I think this is to what her message is referring - please notify her - if anything else is needed, please tell her to let me know    Also, please notify pt that I received response for rec for neurologist for tremor evaluation - Dr. Rodriguez was recommend in movement disorders clinic   - referral signed - please arrange pt appt with Dr. Rodriguez - thanks!

## 2019-01-16 NOTE — TELEPHONE ENCOUNTER
Pt stated that she was told that if her PCP puts in an order or referral to the [facility] she will be able to get an discount or something science she is an member . I stated to pt that I was not too sure and that I will send an message to PCP and we will get back w/ her   Please authorize and advise

## 2019-01-16 NOTE — PROGRESS NOTES
"Subjective:   Patient ID: Trupti Coronado is a 58 y.o. female  Chief complaint:   Chief Complaint   Patient presents with    Follow-up       HPI   Pt here for f/u of     CHARIS and vit d def, hx of gastric surgery     Vit d def: completed rx - took twice weekly  Level still low      Gets mmg in [place] and scheduled for in 1 week   Gyn in [place] and utd on pelvic exam   - she ordered dexa and was wnl     CHARIS: has been taking mvi with iron as has not nancy oral iron in past due to GI SE  - never had iron infusions  - reports feeling more fatigued   - not getting reg exercise but is working to improve this    Tremor: seen by neuro - would like to f/u for re-eval as difficulty with writing     Review of Systems    Objective:  Vitals:    01/16/19 1156   BP: 118/74   Pulse: 76   SpO2: 97%   Weight: 81 kg (178 lb 9.2 oz)   Height: 5' 2" (1.575 m)     Body mass index is 32.66 kg/m².    Physical Exam   Constitutional: She is oriented to person, place, and time. She appears well-developed and well-nourished.   HENT:   Head: Normocephalic and atraumatic.   Eyes: Conjunctivae and EOM are normal.   Neck: Normal range of motion. Neck supple.   Cardiovascular: Normal rate, regular rhythm and intact distal pulses.   Pulmonary/Chest: Effort normal and breath sounds normal.   Abdominal: Soft. Normal appearance and bowel sounds are normal.   Musculoskeletal: She exhibits no edema or tenderness.   Neurological: She is alert and oriented to person, place, and time. She has normal strength. Gait normal.   Skin: Skin is warm, dry and intact. No cyanosis. Nails show no clubbing.   Psychiatric: She has a normal mood and affect. Her speech is normal and behavior is normal. Cognition and memory are normal.   Vitals reviewed.    Assessment:  1. Vitamin D deficiency    2. Screening for breast cancer    3. Pap smear for cervical cancer screening    4. History of Tatyana-en-Y gastric bypass    5. B12 deficiency    6. Other iron deficiency anemia    7. Tremor  "   8. Chronic fatigue    9. Skin exam, screening for cancer        Plan:  Trupti was seen today for follow-up.    Diagnoses and all orders for this visit:    Vitamin D deficiency  -     Vitamin D; Future  Check level in 10 weeks   Refill given   Trial of vit d rx daily x 1 week and then twice weekly x 12 weeks     Screening for breast cancer  mmg ordered by gyn in [place]    Pap smear for cervical cancer screening  As above     History of Tatyana-en-Y gastric bypass  Cont supplements     B12 deficiency  Con subcut B12     Other iron deficiency anemia  -     Ambulatory Referral to Hematology / Oncology  Has not nancy oral iron   Refer to hematology to discuss iron transfusion?     Tremor  -     Ambulatory Referral to Neurology  -     Ambulatory Referral to Neurology  - refer to movement disorders clinic     Chronic fatigue  As above     Skin exam, screening for cancer  -     Ambulatory Referral to Dermatology    Other orders  -     ergocalciferol (ERGOCALCIFEROL) 50,000 unit Cap; Take daily for 7 days and then resume taking every Sunday and Thursday  -     cyanocobalamin 1,000 mcg/mL injection; Inject 1 mL (1,000 mcg total) into the skin every 30 days.  -     syringe, disposable, 3 mL Syrg; 1 each by Misc.(Non-Drug; Combo Route) route every 30 days. 22 gauge    Health Maintenance   Topic Date Due    Zoster Vaccine  03/05/2018    Pap Smear with HPV Cotest  01/16/2020    Mammogram  01/25/2020    Lipid Panel  08/16/2023    Colonoscopy  11/29/2027    TETANUS VACCINE  09/18/2028    Hepatitis C Screening  Completed    Influenza Vaccine  Completed

## 2019-01-17 ENCOUNTER — TELEPHONE (OUTPATIENT)
Dept: INTERNAL MEDICINE | Facility: CLINIC | Age: 59
End: 2019-01-17

## 2019-01-17 ENCOUNTER — TELEPHONE (OUTPATIENT)
Dept: NEUROLOGY | Facility: CLINIC | Age: 59
End: 2019-01-17

## 2019-01-17 NOTE — TELEPHONE ENCOUNTER
Spoke with patient to schedule her with dr gilmore she said that she will call back to schedule   ----- Message from Arslan Banda MD sent at 1/16/2019  1:22 PM CST -----  I see some people with tremor but if she's looking for a second opinion, let's just get her in with one of the movement people.  Neel, could you get her scheduled with Dr. Gilmore? (Valerie would likely be a long wait)  CV    ----- Message -----  From: Neena Barrera MD  Sent: 1/16/2019  12:15 PM  To: Arslan Banda MD    Hi, I have a pt here with me today with tremor - this effects daily activities and writing and is quite bothersome to her - seen by another neuro and she did not feel like appt was beneficial - is this something that I would refer to you for a 2nd opinion or to Dr. Padilla in movement disorders - thank you so much for your time!     Desi

## 2019-01-17 NOTE — TELEPHONE ENCOUNTER
Left voice message for patient to schedule appointment from referral to Dermatology and Neurology Clinic.  Deacon GARCIA  [phone]

## 2019-01-18 ENCOUNTER — INITIAL CONSULT (OUTPATIENT)
Dept: HEMATOLOGY/ONCOLOGY | Facility: CLINIC | Age: 59
End: 2019-01-18
Attending: INTERNAL MEDICINE
Payer: COMMERCIAL

## 2019-01-18 ENCOUNTER — LAB VISIT (OUTPATIENT)
Dept: LAB | Facility: OTHER | Age: 59
End: 2019-01-18
Attending: INTERNAL MEDICINE
Payer: COMMERCIAL

## 2019-01-18 VITALS
HEART RATE: 77 BPM | WEIGHT: 177 LBS | DIASTOLIC BLOOD PRESSURE: 83 MMHG | BODY MASS INDEX: 32.57 KG/M2 | HEIGHT: 62 IN | OXYGEN SATURATION: 97 % | RESPIRATION RATE: 16 BRPM | SYSTOLIC BLOOD PRESSURE: 130 MMHG

## 2019-01-18 DIAGNOSIS — D64.9 ANEMIA, UNSPECIFIED TYPE: ICD-10-CM

## 2019-01-18 DIAGNOSIS — D64.9 ANEMIA, UNSPECIFIED TYPE: Primary | ICD-10-CM

## 2019-01-18 DIAGNOSIS — D50.8 OTHER IRON DEFICIENCY ANEMIA: ICD-10-CM

## 2019-01-18 DIAGNOSIS — Z98.84 HISTORY OF GASTRIC BYPASS: ICD-10-CM

## 2019-01-18 LAB
BASOPHILS # BLD AUTO: 0.03 K/UL
BASOPHILS NFR BLD: 0.4 %
DIFFERENTIAL METHOD: ABNORMAL
EOSINOPHIL # BLD AUTO: 0.1 K/UL
EOSINOPHIL NFR BLD: 0.9 %
ERYTHROCYTE [DISTWIDTH] IN BLOOD BY AUTOMATED COUNT: 15.6 %
FOLATE SERPL-MCNC: 13.9 NG/ML
HAPTOGLOB SERPL-MCNC: 100 MG/DL
HCT VFR BLD AUTO: 36.3 %
HGB BLD-MCNC: 11.5 G/DL
LDH SERPL L TO P-CCNC: 173 U/L
LYMPHOCYTES # BLD AUTO: 2.7 K/UL
LYMPHOCYTES NFR BLD: 40 %
MCH RBC QN AUTO: 24.8 PG
MCHC RBC AUTO-ENTMCNC: 31.7 G/DL
MCV RBC AUTO: 78 FL
MONOCYTES # BLD AUTO: 0.5 K/UL
MONOCYTES NFR BLD: 6.8 %
NEUTROPHILS # BLD AUTO: 3.5 K/UL
NEUTROPHILS NFR BLD: 51.8 %
PLATELET # BLD AUTO: 258 K/UL
PMV BLD AUTO: 10.1 FL
RBC # BLD AUTO: 4.64 M/UL
VIT B12 SERPL-MCNC: 1294 PG/ML
WBC # BLD AUTO: 6.78 K/UL

## 2019-01-18 PROCEDURE — 99999 PR PBB SHADOW E&M-EST. PATIENT-LVL III: ICD-10-PCS | Mod: PBBFAC,,, | Performed by: INTERNAL MEDICINE

## 2019-01-18 PROCEDURE — 99999 PR PBB SHADOW E&M-EST. PATIENT-LVL III: CPT | Mod: PBBFAC,,, | Performed by: INTERNAL MEDICINE

## 2019-01-18 PROCEDURE — 86334 PATHOLOGIST INTERPRETATION IFE: ICD-10-PCS | Mod: 26,,, | Performed by: PATHOLOGY

## 2019-01-18 PROCEDURE — 99204 PR OFFICE/OUTPT VISIT, NEW, LEVL IV, 45-59 MIN: ICD-10-PCS | Mod: S$GLB,,, | Performed by: INTERNAL MEDICINE

## 2019-01-18 PROCEDURE — 83615 LACTATE (LD) (LDH) ENZYME: CPT

## 2019-01-18 PROCEDURE — 84165 PATHOLOGIST INTERPRETATION SPE: ICD-10-PCS | Mod: 26,,, | Performed by: PATHOLOGY

## 2019-01-18 PROCEDURE — 86334 IMMUNOFIX E-PHORESIS SERUM: CPT

## 2019-01-18 PROCEDURE — 99204 OFFICE O/P NEW MOD 45 MIN: CPT | Mod: S$GLB,,, | Performed by: INTERNAL MEDICINE

## 2019-01-18 PROCEDURE — 82746 ASSAY OF FOLIC ACID SERUM: CPT

## 2019-01-18 PROCEDURE — 85025 COMPLETE CBC W/AUTO DIFF WBC: CPT

## 2019-01-18 PROCEDURE — 36415 COLL VENOUS BLD VENIPUNCTURE: CPT

## 2019-01-18 PROCEDURE — 82607 VITAMIN B-12: CPT

## 2019-01-18 PROCEDURE — 3008F PR BODY MASS INDEX (BMI) DOCUMENTED: ICD-10-PCS | Mod: CPTII,S$GLB,, | Performed by: INTERNAL MEDICINE

## 2019-01-18 PROCEDURE — 86334 IMMUNOFIX E-PHORESIS SERUM: CPT | Mod: 26,,, | Performed by: PATHOLOGY

## 2019-01-18 PROCEDURE — 83010 ASSAY OF HAPTOGLOBIN QUANT: CPT

## 2019-01-18 PROCEDURE — 3008F BODY MASS INDEX DOCD: CPT | Mod: CPTII,S$GLB,, | Performed by: INTERNAL MEDICINE

## 2019-01-18 PROCEDURE — 84165 PROTEIN E-PHORESIS SERUM: CPT

## 2019-01-18 PROCEDURE — 84165 PROTEIN E-PHORESIS SERUM: CPT | Mod: 26,,, | Performed by: PATHOLOGY

## 2019-01-18 RX ORDER — SODIUM CHLORIDE 0.9 % (FLUSH) 0.9 %
10 SYRINGE (ML) INJECTION
Status: CANCELLED | OUTPATIENT
Start: 2019-01-25

## 2019-01-18 RX ORDER — HEPARIN 100 UNIT/ML
5 SYRINGE INTRAVENOUS
Status: CANCELLED | OUTPATIENT
Start: 2019-01-25

## 2019-01-18 RX ORDER — SODIUM CHLORIDE 9 MG/ML
INJECTION, SOLUTION INTRAVENOUS CONTINUOUS
Status: CANCELLED | OUTPATIENT
Start: 2019-01-25

## 2019-01-18 NOTE — LETTER
January 18, 2019      Neena Barrera MD  [address]  [facility] [address]           [facility] - Hematology Oncology  [address], [address]  [facility] [address]  Phone: [phone]          Patient: Trupti Coronado   MR Number: [id number]   YOB: 1960   Date of Visit: 1/18/2019       Dear Dr. Neena Barrera:    Thank you for referring Trupti Coronado to me for evaluation. Attached you will find relevant portions of my assessment and plan of care.    If you have questions, please do not hesitate to call me. I look forward to following Trupti Coronado along with you.    Sincerely,    Asia Gonzales MD    Enclosure  CC:  No Recipients    If you would like to receive this communication electronically, please contact [email or web address] or [phone] to request more information on [email or web address] Link access.    For providers and/or their staff who would like to refer a patient to [facility], please contact us through our one-stop-shop provider referral line, [facility], at [phone].    If you feel you have received this communication in error or would no longer like to receive these types of communications, please e-mail [email or web address]

## 2019-01-18 NOTE — Clinical Note
Check CBC, folate, vit B12, hapto, LDH, SPEP, immunofixation. Please give patient stool card for guaiac stool. Verify with PA re injectafer. Return next week and the following week for injectafer. RTC in 3 months, check CBC, ferritin, iron 2 days prior to return

## 2019-01-18 NOTE — PROGRESS NOTES
CC:  anemia    HPI:  Ms Coronado is a very pleasant 57 yo woman with history of gastric bypass in  who presents today for further evaluation of anemia. Her ferritin has been low in the 3-5 range since Sep 2017. CBC on 18 showed WBC 7.48, Hb 11.6, MCV 80, plt count 256. Ferritin 5, iron 78, TIBC 522, iron saturation 15%. She presents today for further evaluation. Has fatigue. Take a multivitamin a day which contains 18 mg of iron. Cannot tolerate higher dose of oral iron due to GI upset and constipation. Colonoscopy on 2017 at [facility] showed diverticulosis, otherwise normal. She takes vit B12 injection monthly at home.     ECO    History reviewed. No pertinent past medical history.     Past Surgical History:   Procedure Laterality Date    BREAST RECONSTRUCTION      lift - no implants    COSMETIC SURGERY      FACIAL COSMETIC SURGERY      GASTRIC BYPASS      LIPOSUCTION         Family History   Problem Relation Age of Onset    Heart disease Mother     Hypertension Mother     Hyperlipidemia Mother     Diabetes Mother     Macular degeneration Mother     Stroke Father     Hypertension Father     Hyperlipidemia Father     Peripheral vascular disease Father     Heart disease Father         congenital heart valve defect    Diabetes Sister     Hypertension Sister     Other Sister         fatty liver    Heart disease Brother     No Known Problems Daughter     No Known Problems Daughter     No Known Problems Brother     Other Sister        Social History     Socioeconomic History    Marital status:      Spouse name: Not on file    Number of children: Not on file    Years of education: Not on file    Highest education level: Not on file   Social Needs    Financial resource strain: Not on file    Food insecurity - worry: Not on file    Food insecurity - inability: Not on file    Transportation needs - medical: Not on file    Transportation needs - non-medical: Not on file    Occupational History    Occupation: nurse   Tobacco Use    Smoking status: Never Smoker    Smokeless tobacco: Never Used   Substance and Sexual Activity    Alcohol use: Yes     Comment: rare    Drug use: No    Sexual activity: No     Partners: Male     Comment:     Other Topics Concern    Not on file   Social History Narrative    Plastic surgery nurse    From [place]    Moved back to [place] 2014 and then moved to [facility] July 2017       Review of Systems   Constitutional: Positive for fatigue. Negative for appetite change, chills, fever and unexpected weight change.   HENT: Negative for mouth sores, nosebleeds, tinnitus, trouble swallowing and voice change.    Eyes: Negative for pain, redness and visual disturbance.   Respiratory: Negative for cough, shortness of breath and wheezing.    Cardiovascular: Negative for chest pain, palpitations and leg swelling.   Gastrointestinal: Negative for abdominal distention, abdominal pain, blood in stool, constipation, diarrhea, nausea and vomiting.   Endocrine: Negative for polydipsia, polyphagia and polyuria.   Genitourinary: Negative for flank pain, frequency and hematuria.   Musculoskeletal: Negative for arthralgias, back pain, gait problem, joint swelling, myalgias, neck pain and neck stiffness.   Skin: Negative for color change, pallor, rash and wound.   Neurological: Negative for tremors, seizures, syncope, speech difficulty, weakness, light-headedness, numbness and headaches.   Hematological: Negative for adenopathy. Does not bruise/bleed easily.   Psychiatric/Behavioral: Negative for confusion, dysphoric mood and self-injury. The patient is not nervous/anxious.    All other systems reviewed and are negative.      Objective:  Physical Exam   Constitutional: She is oriented to person, place, and time. She appears well-developed and well-nourished. No distress.   HENT:   Head: Normocephalic and atraumatic.   Mouth/Throat: Oropharynx is clear and moist.  No oropharyngeal exudate.   Eyes: Conjunctivae and EOM are normal. Pupils are equal, round, and reactive to light. No scleral icterus.   Neck: Normal range of motion. Neck supple. No JVD present. No thyromegaly present.   Cardiovascular: Normal rate, regular rhythm, normal heart sounds and intact distal pulses. Exam reveals no gallop and no friction rub.   No murmur heard.  Pulmonary/Chest: Effort normal and breath sounds normal. No respiratory distress. She has no wheezes. She has no rales.   Abdominal: Soft. Bowel sounds are normal. She exhibits no distension and no mass. There is no hepatosplenomegaly. There is no tenderness. There is no rebound. No hernia.   Musculoskeletal: Normal range of motion. She exhibits no edema, tenderness or deformity.   Lymphadenopathy:     She has no cervical adenopathy.        Right: No supraclavicular adenopathy present.        Left: No supraclavicular adenopathy present.   Neurological: She is alert and oriented to person, place, and time. No cranial nerve deficit. She exhibits normal muscle tone.   Skin: Skin is warm and dry. No rash noted. She is not diaphoretic. No erythema. No pallor.   Psychiatric: She has a normal mood and affect. Her behavior is normal. Judgment and thought content normal.   Vitals reviewed.      Labs:  Her ferritin has been low in the 3-5 range since Sep 2017. CBC on 12/17/18 showed WBC 7.48, Hb 11.6, MCV 80, plt count 256. Ferritin 5, iron 78, TIBC 522, iron saturation 15%.     Assessment and plan:    1. Anemia, unspecified type    2. Other iron deficiency anemia    3. History of gastric bypass      1.2  - Ms Coronado is a 57 yo woman with gastric bypass with anemia. Ferritin is chronically low despite oral iron. Cannot tolerate higher dose due to GI upset and constipation  - complete anemia workup by checking folate, vit B12, Hapto, LDH, SPEP/IFX  - discussed IV injectafer, side effects include but are not limited to infusion reaction, hives, rash,  pruritus, headaches, low phosphorus level, high BP, extravasation causing skin discoloration. She understands and agrees with IV iron  - return next week and the following week for injectafer  - RTC in 3 months with repeat blood work to monitor    3.  - check vit b12, folate  - c/w monthly vit B12

## 2019-01-21 ENCOUNTER — TELEPHONE (OUTPATIENT)
Dept: HEMATOLOGY/ONCOLOGY | Facility: CLINIC | Age: 59
End: 2019-01-21

## 2019-01-21 LAB
ALBUMIN SERPL ELPH-MCNC: 3.8 G/DL
ALPHA1 GLOB SERPL ELPH-MCNC: 0.28 G/DL
ALPHA2 GLOB SERPL ELPH-MCNC: 0.61 G/DL
B-GLOBULIN SERPL ELPH-MCNC: 0.92 G/DL
GAMMA GLOB SERPL ELPH-MCNC: 0.78 G/DL
INTERPRETATION SERPL IFE-IMP: NORMAL
PATHOLOGIST INTERPRETATION IFE: NORMAL
PATHOLOGIST INTERPRETATION SPE: NORMAL
PROT SERPL-MCNC: 6.4 G/DL

## 2019-01-25 ENCOUNTER — INFUSION (OUTPATIENT)
Dept: INFUSION THERAPY | Facility: OTHER | Age: 59
End: 2019-01-25
Attending: OBSTETRICS & GYNECOLOGY
Payer: COMMERCIAL

## 2019-01-25 VITALS
RESPIRATION RATE: 18 BRPM | HEIGHT: 62 IN | SYSTOLIC BLOOD PRESSURE: 143 MMHG | WEIGHT: 177.25 LBS | TEMPERATURE: 98 F | OXYGEN SATURATION: 99 % | BODY MASS INDEX: 32.62 KG/M2 | DIASTOLIC BLOOD PRESSURE: 71 MMHG | HEART RATE: 78 BPM

## 2019-01-25 DIAGNOSIS — D50.8 OTHER IRON DEFICIENCY ANEMIA: Primary | ICD-10-CM

## 2019-01-25 PROCEDURE — 25000003 PHARM REV CODE 250: Performed by: INTERNAL MEDICINE

## 2019-01-25 PROCEDURE — 96365 THER/PROPH/DIAG IV INF INIT: CPT

## 2019-01-25 PROCEDURE — 63600175 PHARM REV CODE 636 W HCPCS: Mod: JG | Performed by: INTERNAL MEDICINE

## 2019-01-25 RX ORDER — SODIUM CHLORIDE 9 MG/ML
INJECTION, SOLUTION INTRAVENOUS CONTINUOUS
Status: DISCONTINUED | OUTPATIENT
Start: 2019-01-25 | End: 2019-01-25 | Stop reason: HOSPADM

## 2019-01-25 RX ORDER — HEPARIN 100 UNIT/ML
5 SYRINGE INTRAVENOUS
Status: DISCONTINUED | OUTPATIENT
Start: 2019-01-25 | End: 2019-01-25 | Stop reason: HOSPADM

## 2019-01-25 RX ORDER — SODIUM CHLORIDE 0.9 % (FLUSH) 0.9 %
10 SYRINGE (ML) INJECTION
Status: CANCELLED | OUTPATIENT
Start: 2019-01-25

## 2019-01-25 RX ORDER — SODIUM CHLORIDE 9 MG/ML
INJECTION, SOLUTION INTRAVENOUS CONTINUOUS
Status: CANCELLED | OUTPATIENT
Start: 2019-01-25

## 2019-01-25 RX ORDER — HEPARIN 100 UNIT/ML
5 SYRINGE INTRAVENOUS
Status: CANCELLED | OUTPATIENT
Start: 2019-01-25

## 2019-01-25 RX ORDER — SODIUM CHLORIDE 0.9 % (FLUSH) 0.9 %
10 SYRINGE (ML) INJECTION
Status: DISCONTINUED | OUTPATIENT
Start: 2019-01-25 | End: 2019-01-25 | Stop reason: HOSPADM

## 2019-01-25 RX ADMIN — SODIUM CHLORIDE: 0.9 INJECTION, SOLUTION INTRAVENOUS at 11:01

## 2019-01-25 RX ADMIN — FERRIC CARBOXYMALTOSE INJECTION 750 MG: 50 INJECTION, SOLUTION INTRAVENOUS at 11:01

## 2019-01-25 NOTE — PLAN OF CARE
Problem: Adult Inpatient Plan of Care  Goal: Plan of Care Review  Outcome: Ongoing (interventions implemented as appropriate)  Pt tolerated Injectafer infusion without issue. VSS. AVS given.

## 2019-01-28 ENCOUNTER — TELEPHONE (OUTPATIENT)
Dept: HEMATOLOGY/ONCOLOGY | Facility: CLINIC | Age: 59
End: 2019-01-28

## 2019-01-28 NOTE — TELEPHONE ENCOUNTER
Request forward to our , Danay. Appointment re scheduled per Ms Coronado request. Appointment information given to patient.

## 2019-01-28 NOTE — TELEPHONE ENCOUNTER
----- Message from Richelle Morgan sent at 1/28/2019 10:52 AM CST -----  Contact: Pt   Pt needs to reschedule her 2/1 infusion to 1/31 because she will be leaving out of town  Contact preference: [phone]

## 2019-01-29 ENCOUNTER — LAB VISIT (OUTPATIENT)
Dept: LAB | Facility: OTHER | Age: 59
End: 2019-01-29
Attending: INTERNAL MEDICINE
Payer: COMMERCIAL

## 2019-01-29 DIAGNOSIS — D64.9 ANEMIA, UNSPECIFIED TYPE: ICD-10-CM

## 2019-01-29 LAB — OB PNL STL: NEGATIVE

## 2019-01-29 PROCEDURE — 82272 OCCULT BLD FECES 1-3 TESTS: CPT

## 2019-01-31 ENCOUNTER — INFUSION (OUTPATIENT)
Dept: INFUSION THERAPY | Facility: OTHER | Age: 59
End: 2019-01-31
Attending: INTERNAL MEDICINE
Payer: COMMERCIAL

## 2019-01-31 VITALS
BODY MASS INDEX: 32.46 KG/M2 | SYSTOLIC BLOOD PRESSURE: 140 MMHG | RESPIRATION RATE: 18 BRPM | TEMPERATURE: 98 F | DIASTOLIC BLOOD PRESSURE: 75 MMHG | OXYGEN SATURATION: 100 % | HEIGHT: 62 IN | HEART RATE: 72 BPM | WEIGHT: 176.38 LBS

## 2019-01-31 DIAGNOSIS — D50.8 OTHER IRON DEFICIENCY ANEMIA: Primary | ICD-10-CM

## 2019-01-31 PROCEDURE — 25000003 PHARM REV CODE 250: Performed by: INTERNAL MEDICINE

## 2019-01-31 PROCEDURE — 96365 THER/PROPH/DIAG IV INF INIT: CPT

## 2019-01-31 PROCEDURE — 63600175 PHARM REV CODE 636 W HCPCS: Mod: JG | Performed by: INTERNAL MEDICINE

## 2019-01-31 RX ORDER — SODIUM CHLORIDE 0.9 % (FLUSH) 0.9 %
10 SYRINGE (ML) INJECTION
Status: CANCELLED | OUTPATIENT
Start: 2019-02-01

## 2019-01-31 RX ORDER — HEPARIN 100 UNIT/ML
5 SYRINGE INTRAVENOUS
Status: DISCONTINUED | OUTPATIENT
Start: 2019-01-31 | End: 2019-01-31 | Stop reason: HOSPADM

## 2019-01-31 RX ORDER — SODIUM CHLORIDE 9 MG/ML
INJECTION, SOLUTION INTRAVENOUS CONTINUOUS
Status: CANCELLED | OUTPATIENT
Start: 2019-02-01

## 2019-01-31 RX ORDER — HEPARIN 100 UNIT/ML
5 SYRINGE INTRAVENOUS
Status: CANCELLED | OUTPATIENT
Start: 2019-02-01

## 2019-01-31 RX ORDER — SODIUM CHLORIDE 0.9 % (FLUSH) 0.9 %
10 SYRINGE (ML) INJECTION
Status: DISCONTINUED | OUTPATIENT
Start: 2019-01-31 | End: 2019-01-31 | Stop reason: HOSPADM

## 2019-01-31 RX ORDER — SODIUM CHLORIDE 9 MG/ML
INJECTION, SOLUTION INTRAVENOUS CONTINUOUS
Status: DISCONTINUED | OUTPATIENT
Start: 2019-01-31 | End: 2019-01-31 | Stop reason: HOSPADM

## 2019-01-31 RX ADMIN — FERRIC CARBOXYMALTOSE INJECTION 750 MG: 50 INJECTION, SOLUTION INTRAVENOUS at 11:01

## 2019-01-31 RX ADMIN — SODIUM CHLORIDE: 0.9 INJECTION, SOLUTION INTRAVENOUS at 11:01

## 2019-01-31 NOTE — PLAN OF CARE
Problem: Adult Inpatient Plan of Care  Goal: Plan of Care Review  Outcome: Ongoing (interventions implemented as appropriate)  #2/2 Injectafer administered, patient tolerated well. VSS, NAD. D/C'd home with self.

## 2019-03-14 ENCOUNTER — TELEPHONE (OUTPATIENT)
Dept: INTERNAL MEDICINE | Facility: CLINIC | Age: 59
End: 2019-03-14

## 2019-03-14 NOTE — TELEPHONE ENCOUNTER
----- Message from Skip Olivarez sent at 3/14/2019  9:24 AM CDT -----  Contact: DIGNA DIAZ [[id number]]  Name of Who is Calling: DIGNA DIAZ [[id number]]      What is the request in detail: Patient would like to speak with staff in regards to being scheduled for left leg pain. States it hurts enough that she can barely walk. Patient only wants to see Dr. Barrera and states next available is too far.. Please advise      Can the clinic reply by [facility]: no      What Number [facility] Back [facility]: [phone]

## 2019-03-14 NOTE — TELEPHONE ENCOUNTER
Spoke with pt on the left leg pain. It is on the outer side. No reddness or hot to touch. It is a little swollen. Scheduled appt 3/19/19 [id number] am. Pt verbalized understanding

## 2019-03-19 ENCOUNTER — HOSPITAL ENCOUNTER (OUTPATIENT)
Dept: RADIOLOGY | Facility: OTHER | Age: 59
Discharge: HOME | End: 2019-03-19
Attending: INTERNAL MEDICINE
Payer: COMMERCIAL

## 2019-03-19 ENCOUNTER — OFFICE VISIT (OUTPATIENT)
Dept: INTERNAL MEDICINE | Facility: CLINIC | Age: 59
End: 2019-03-19
Attending: INTERNAL MEDICINE
Payer: COMMERCIAL

## 2019-03-19 ENCOUNTER — HOSPITAL ENCOUNTER (OUTPATIENT)
Dept: RADIOLOGY | Facility: HOSPITAL | Age: 59
Discharge: HOME | End: 2019-03-19
Attending: INTERNAL MEDICINE
Payer: COMMERCIAL

## 2019-03-19 VITALS
HEIGHT: 61 IN | HEART RATE: 71 BPM | BODY MASS INDEX: 33.09 KG/M2 | DIASTOLIC BLOOD PRESSURE: 83 MMHG | SYSTOLIC BLOOD PRESSURE: 122 MMHG | OXYGEN SATURATION: 99 % | WEIGHT: 175.25 LBS

## 2019-03-19 DIAGNOSIS — E55.9 VITAMIN D DEFICIENCY: ICD-10-CM

## 2019-03-19 DIAGNOSIS — M25.562 ACUTE PAIN OF LEFT KNEE: ICD-10-CM

## 2019-03-19 DIAGNOSIS — R05.9 COUGH: ICD-10-CM

## 2019-03-19 DIAGNOSIS — M25.562 ACUTE PAIN OF LEFT KNEE: Primary | ICD-10-CM

## 2019-03-19 PROCEDURE — 73562 X-RAY EXAM OF KNEE 3: CPT | Mod: 26,LT,, | Performed by: RADIOLOGY

## 2019-03-19 PROCEDURE — 99214 OFFICE O/P EST MOD 30 MIN: CPT | Mod: S$GLB,,, | Performed by: INTERNAL MEDICINE

## 2019-03-19 PROCEDURE — 73562 XR KNEE 3 VIEW LEFT: ICD-10-PCS | Mod: 26,LT,, | Performed by: RADIOLOGY

## 2019-03-19 PROCEDURE — 3008F BODY MASS INDEX DOCD: CPT | Mod: CPTII,S$GLB,, | Performed by: INTERNAL MEDICINE

## 2019-03-19 PROCEDURE — 93971 US LOWER EXTREMITY VEINS LEFT: ICD-10-PCS | Mod: 26,LT,, | Performed by: RADIOLOGY

## 2019-03-19 PROCEDURE — 73562 X-RAY EXAM OF KNEE 3: CPT | Mod: TC,FY,LT

## 2019-03-19 PROCEDURE — 99999 PR PBB SHADOW E&M-EST. PATIENT-LVL IV: ICD-10-PCS | Mod: PBBFAC,,, | Performed by: INTERNAL MEDICINE

## 2019-03-19 PROCEDURE — 3008F PR BODY MASS INDEX (BMI) DOCUMENTED: ICD-10-PCS | Mod: CPTII,S$GLB,, | Performed by: INTERNAL MEDICINE

## 2019-03-19 PROCEDURE — 99999 PR PBB SHADOW E&M-EST. PATIENT-LVL IV: CPT | Mod: PBBFAC,,, | Performed by: INTERNAL MEDICINE

## 2019-03-19 PROCEDURE — 93971 EXTREMITY STUDY: CPT | Mod: 26,LT,, | Performed by: RADIOLOGY

## 2019-03-19 PROCEDURE — 93971 EXTREMITY STUDY: CPT | Mod: TC,LT

## 2019-03-19 PROCEDURE — 99214 PR OFFICE/OUTPT VISIT, EST, LEVL IV, 30-39 MIN: ICD-10-PCS | Mod: S$GLB,,, | Performed by: INTERNAL MEDICINE

## 2019-03-19 RX ORDER — CYANOCOBALAMIN 1000 UG/ML
INJECTION, SOLUTION INTRAMUSCULAR; SUBCUTANEOUS
COMMUNITY
Start: 2016-08-29 | End: 2019-11-12 | Stop reason: SDUPTHER

## 2019-03-19 RX ORDER — AZELASTINE 1 MG/ML
1 SPRAY, METERED NASAL 2 TIMES DAILY
Qty: 30 ML | Refills: 2 | Status: SHIPPED | OUTPATIENT
Start: 2019-03-19 | End: 2019-06-16 | Stop reason: SDUPTHER

## 2019-03-19 NOTE — PATIENT INSTRUCTIONS
Over the counter anti-histamine like zyrtec daily along with twice daily nasal saline rinses (oceans nasal saline 3-4 sprays per nostril twice daily) and gently blow nose followed by flonase 2 sprays daily per nostril for 1 month to look for improvement. Flonase takes about 1 week to start working.   Use astelin after flonase.

## 2019-03-19 NOTE — PROGRESS NOTES
"Subjective:   Patient ID: Trupti Coronado is a 59 y.o. female  Chief complaint:   Chief Complaint   Patient presents with    Leg Pain     left leg       HPI     Pt here for leg pain located on lateral aspect of left leg and post knee - New problem to me  Went to gym and then went home and showered and walked to car and then developed acute knee pain located at lateral aspect of leg and posterior knee - piecing pain   + edema - mild from ankle to knee of left leg - knee and calf and ankle since onset of pain   No redness or inc warmth   No fevers or chills   - has been bending and stretching and no gyn x 1 week   - no pain at this time but still with mild left leg edema  Pain if present is worse at night - did not have pain at night    Improves with nsaids and apap and rest   - no hx of blood clot   - no recent travel   No Inciting event   No popping or clicking, stuck in position     Iron deficiency: Had iron transfusion and felt immediately better     Had dry cough x 2 months - improved over past week   No reflux   Reports mild seasonal allx - will take protonix 1-2 times per month when needed for intermittent sx   + post nasal drip, sinus congestion, runny nose     Vit d def: taking rx consistently   Review of Systems    Objective:  Vitals:    03/19/19 1014   BP: 122/83   Pulse: 71   SpO2: 99%   Weight: 79.5 kg (175 lb 4.3 oz)   Height: 5' 1" (1.549 m)     Body mass index is 33.12 kg/m².    Physical Exam   Constitutional: She is oriented to person, place, and time. She appears well-developed and well-nourished.   HENT:   Head: Normocephalic and atraumatic.   Mouth/Throat: Oropharynx is clear and moist. No oropharyngeal exudate.   Nasal turbinates hypertrophied and pale   No ttp of max sinuses   Eyes: Conjunctivae and EOM are normal.   Neck: Normal range of motion. Neck supple.   Cardiovascular: Normal rate, regular rhythm and intact distal pulses.   Pulmonary/Chest: Effort normal and breath sounds normal. No " stridor. She has no wheezes.   Abdominal: Soft. Normal appearance and bowel sounds are normal.   Musculoskeletal: She exhibits edema. She exhibits no tenderness.   No ttp of B  No laxity of patella   No calf ttp or ttp of post knees   + trace edema of left leg from ankle to knee   FROM of B knees   Neg ant and post drawer test    Neurological: She is alert and oriented to person, place, and time. She has normal strength. Gait normal.   Skin: Skin is warm, dry and intact. No cyanosis. Nails show no clubbing.   Psychiatric: She has a normal mood and affect. Her speech is normal and behavior is normal. Cognition and memory are normal.   Vitals reviewed.      Assessment:  1. Acute pain of left knee    2. Cough    3. Vitamin D deficiency        Plan:  Trupti was seen today for leg pain.    Diagnoses and all orders for this visit:    Acute pain of left knee  -     X-Ray Knee 3 View Left; Future - +arthritis - suspect edema due to OA of knee and knee strain - rec RICE therapy - currently in no pain - can take otc apap arthritis and alt with aleve prn if needed   -     US Lower Extremity Veins Left; Future - neg for DVT    Cough  Suspect 2/2 to pnd - rec nasal saline rinses bid and gently blow nose followed by flonase  otc anti- histamine and astelin  If does not improve will consider trial of zantac x 2-4 weeks and cxr   Er and rtc prompts given     Vitamin D deficiency  C/w vit d supp - due for lab in a few weeks      Other orders  -     azelastine (ASTELIN) 137 mcg (0.1 %) nasal spray; 1 spray (137 mcg total) by Nasal route 2 (two) times daily.        Health Maintenance   Topic Date Due    Zoster Vaccine  03/05/2018    Pap Smear with HPV Cotest  01/16/2020    Mammogram  01/25/2020    Lipid Panel  08/16/2023    Colonoscopy  11/29/2027    TETANUS VACCINE  09/18/2028    Hepatitis C Screening  Completed    Influenza Vaccine  Completed

## 2019-03-21 ENCOUNTER — TELEPHONE (OUTPATIENT)
Dept: INTERNAL MEDICINE | Facility: CLINIC | Age: 59
End: 2019-03-21

## 2019-03-21 DIAGNOSIS — R93.89 ABNORMAL FINDING OF DIAGNOSTIC IMAGING: Primary | ICD-10-CM

## 2019-03-21 NOTE — TELEPHONE ENCOUNTER
Message sent to pt via my chart with lab results and updates to plan.     Referral to ortho signed - please arrange

## 2019-03-25 ENCOUNTER — TELEPHONE (OUTPATIENT)
Dept: INTERNAL MEDICINE | Facility: CLINIC | Age: 59
End: 2019-03-25

## 2019-03-25 NOTE — TELEPHONE ENCOUNTER
Left voice message for patient to schedule appointment from referral to Orthopedic Clinic.  Deacon GARCIA  [phone]

## 2019-03-27 ENCOUNTER — LAB VISIT (OUTPATIENT)
Dept: LAB | Facility: OTHER | Age: 59
End: 2019-03-27
Attending: INTERNAL MEDICINE
Payer: COMMERCIAL

## 2019-03-27 DIAGNOSIS — D64.9 ANEMIA, UNSPECIFIED TYPE: ICD-10-CM

## 2019-03-27 DIAGNOSIS — E55.9 VITAMIN D DEFICIENCY: ICD-10-CM

## 2019-03-27 LAB
25(OH)D3+25(OH)D2 SERPL-MCNC: 23 NG/ML (ref 30–96)
BASOPHILS # BLD AUTO: 0.02 K/UL (ref 0–0.2)
BASOPHILS NFR BLD: 0.3 % (ref 0–1.9)
DIFFERENTIAL METHOD: ABNORMAL
EOSINOPHIL # BLD AUTO: 0 K/UL (ref 0–0.5)
EOSINOPHIL NFR BLD: 0.3 % (ref 0–8)
ERYTHROCYTE [DISTWIDTH] IN BLOOD BY AUTOMATED COUNT: 16.7 % (ref 11.5–14.5)
FERRITIN SERPL-MCNC: 166 NG/ML (ref 20–300)
HCT VFR BLD AUTO: 42.6 % (ref 37–48.5)
HGB BLD-MCNC: 14 G/DL (ref 12–16)
IRON SERPL-MCNC: 124 UG/DL (ref 30–160)
LYMPHOCYTES # BLD AUTO: 2.4 K/UL (ref 1–4.8)
LYMPHOCYTES NFR BLD: 37.5 % (ref 18–48)
MCH RBC QN AUTO: 28.2 PG (ref 27–31)
MCHC RBC AUTO-ENTMCNC: 32.9 G/DL (ref 32–36)
MCV RBC AUTO: 86 FL (ref 82–98)
MONOCYTES # BLD AUTO: 0.4 K/UL (ref 0.3–1)
MONOCYTES NFR BLD: 6.5 % (ref 4–15)
NEUTROPHILS # BLD AUTO: 3.5 K/UL (ref 1.8–7.7)
NEUTROPHILS NFR BLD: 54.9 % (ref 38–73)
PLATELET # BLD AUTO: 224 K/UL (ref 150–350)
PMV BLD AUTO: 10.2 FL (ref 9.2–12.9)
RBC # BLD AUTO: 4.97 M/UL (ref 4–5.4)
SATURATED IRON: 37 % (ref 20–50)
TOTAL IRON BINDING CAPACITY: 337 UG/DL (ref 250–450)
TRANSFERRIN SERPL-MCNC: 228 MG/DL (ref 200–375)
WBC # BLD AUTO: 6.42 K/UL (ref 3.9–12.7)

## 2019-03-27 PROCEDURE — 82306 VITAMIN D 25 HYDROXY: CPT

## 2019-03-27 PROCEDURE — 83540 ASSAY OF IRON: CPT

## 2019-03-27 PROCEDURE — 82728 ASSAY OF FERRITIN: CPT

## 2019-03-27 PROCEDURE — 85025 COMPLETE CBC W/AUTO DIFF WBC: CPT

## 2019-03-28 ENCOUNTER — TELEPHONE (OUTPATIENT)
Dept: INTERNAL MEDICINE | Facility: CLINIC | Age: 59
End: 2019-03-28

## 2019-03-28 DIAGNOSIS — E55.9 VITAMIN D DEFICIENCY: Primary | ICD-10-CM

## 2019-03-28 RX ORDER — ERGOCALCIFEROL 1.25 MG/1
CAPSULE ORAL
Qty: 31 CAPSULE | Refills: 1 | Status: SHIPPED | OUTPATIENT
Start: 2019-03-28 | End: 2019-05-02 | Stop reason: SDUPTHER

## 2019-03-28 NOTE — TELEPHONE ENCOUNTER
.Message sent to pt via my chart with lab results and updates to plan.     Please arrange vit d in 3months

## 2019-04-18 ENCOUNTER — OFFICE VISIT (OUTPATIENT)
Dept: HEMATOLOGY/ONCOLOGY | Facility: CLINIC | Age: 59
End: 2019-04-18
Payer: COMMERCIAL

## 2019-04-18 VITALS
DIASTOLIC BLOOD PRESSURE: 87 MMHG | BODY MASS INDEX: 32.17 KG/M2 | HEART RATE: 72 BPM | HEIGHT: 62 IN | SYSTOLIC BLOOD PRESSURE: 153 MMHG | OXYGEN SATURATION: 98 % | WEIGHT: 174.81 LBS | RESPIRATION RATE: 16 BRPM | TEMPERATURE: 98 F

## 2019-04-18 DIAGNOSIS — I10 ESSENTIAL HYPERTENSION: ICD-10-CM

## 2019-04-18 DIAGNOSIS — D50.8 OTHER IRON DEFICIENCY ANEMIA: Primary | ICD-10-CM

## 2019-04-18 DIAGNOSIS — Z98.84 HISTORY OF ROUX-EN-Y GASTRIC BYPASS: ICD-10-CM

## 2019-04-18 PROCEDURE — 3008F BODY MASS INDEX DOCD: CPT | Mod: CPTII,S$GLB,, | Performed by: INTERNAL MEDICINE

## 2019-04-18 PROCEDURE — 99999 PR PBB SHADOW E&M-EST. PATIENT-LVL III: CPT | Mod: PBBFAC,,, | Performed by: INTERNAL MEDICINE

## 2019-04-18 PROCEDURE — 99999 PR PBB SHADOW E&M-EST. PATIENT-LVL III: ICD-10-PCS | Mod: PBBFAC,,, | Performed by: INTERNAL MEDICINE

## 2019-04-18 PROCEDURE — 3079F PR MOST RECENT DIASTOLIC BLOOD PRESSURE 80-89 MM HG: ICD-10-PCS | Mod: CPTII,S$GLB,, | Performed by: INTERNAL MEDICINE

## 2019-04-18 PROCEDURE — 3008F PR BODY MASS INDEX (BMI) DOCUMENTED: ICD-10-PCS | Mod: CPTII,S$GLB,, | Performed by: INTERNAL MEDICINE

## 2019-04-18 PROCEDURE — 99214 PR OFFICE/OUTPT VISIT, EST, LEVL IV, 30-39 MIN: ICD-10-PCS | Mod: S$GLB,,, | Performed by: INTERNAL MEDICINE

## 2019-04-18 PROCEDURE — 3077F PR MOST RECENT SYSTOLIC BLOOD PRESSURE >= 140 MM HG: ICD-10-PCS | Mod: CPTII,S$GLB,, | Performed by: INTERNAL MEDICINE

## 2019-04-18 PROCEDURE — 99214 OFFICE O/P EST MOD 30 MIN: CPT | Mod: S$GLB,,, | Performed by: INTERNAL MEDICINE

## 2019-04-18 PROCEDURE — 3079F DIAST BP 80-89 MM HG: CPT | Mod: CPTII,S$GLB,, | Performed by: INTERNAL MEDICINE

## 2019-04-18 PROCEDURE — 3077F SYST BP >= 140 MM HG: CPT | Mod: CPTII,S$GLB,, | Performed by: INTERNAL MEDICINE

## 2019-04-18 NOTE — PROGRESS NOTES
PROGRESS NOTE    Subjective:       Patient ID: Trupti Coronado is a 59 y.o. female.    Chief Complaint: follow up for iron deficiency anemia    Diagnosis:  Iron deficiency anemia    Hematologic History:  1. Ms Coronado is a very pleasant 59 yo woman with history of gastric bypass in 2012 who initially saw me on 1/18/19 for further evaluation of anemia. Her ferritin has been low in the 3-5 range since Sep 2017. CBC on 12/17/18 showed WBC 7.48, Hb 11.6, MCV 80, plt count 256. Ferritin 5, iron 78, TIBC 522, iron saturation 15%. She presents today for further evaluation. Has fatigue. Take a multivitamin a day which contains 18 mg of iron. Cannot tolerate higher dose of oral iron due to GI upset and constipation. Colonoscopy on 11/19/2017 at [facility] showed diverticulosis, otherwise normal. She takes vit B12 injection monthly at home. workup in 1/2019 showed normal hapto, LDH, neg SPEP/IFX, stool occult blood is negative.   2. Patient received injectafer x 2 in Jan 2019.      Interval History:   Ms Coronado returns for follow up. Energy level improved after IV iron. Feeling well.       ROS:   A ten-point system review is obtained and negative except for what was stated in the Interval History.     Physical Examination:   Vital signs reviewed.   General: well hydrated, well developed, in no acute distress  HEENT: normocephalic, PERRLA, EOMI, anicteric sclerae, oropharynx clear  Neck: supple, no JVD, thyromegaly, cervical or supraclavicular lymphadenopathy  Lungs: clear breath sounds bilaterally, no wheezing, rales, or rhonchi  Heart: RRR, no M/R/G  Abdomen: soft, no tenderness, non-distended, no hepatosplenomegaly, mass, or hernia. BS present  Extremities: no clubbing, cyanosis, or edema  Skin: no rash, ulcer, or open wounds  Neuro: alert and oriented x 4, no focal neuro deficit  Psych: pleasant and appropriate mood and affect    Objective:     Laboratory Data:  Labs  reviewed. WBC 6.4, Hb 14.0, plt count 224, iron 124, TIBC 337, iron saturation 37%, ferritin 166.         Assessment and Plan:     1. Other iron deficiency anemia    2. History of Tatyana-en-Y gastric bypass    3. Essential hypertension      1.2  - Ms Coronado is a 58 yo woman with chronic CHARIS. Previous colonoscopy neg. Stool occult blood neg  - s/p injectafer in Jan 2019  - Hb normal. Iron indices normal  - RTC in 3 months to monitor    3.  - BP elevated  - f/u with PCP          Electronically signed by Asia Gonzales

## 2019-05-02 RX ORDER — ERGOCALCIFEROL 1.25 MG/1
CAPSULE ORAL
Qty: 31 CAPSULE | Refills: 0 | Status: SHIPPED | OUTPATIENT
Start: 2019-05-02 | End: 2019-07-18 | Stop reason: SDUPTHER

## 2019-05-04 ENCOUNTER — PATIENT MESSAGE (OUTPATIENT)
Dept: INTERNAL MEDICINE | Facility: CLINIC | Age: 59
End: 2019-05-04

## 2019-06-17 RX ORDER — AZELASTINE 1 MG/ML
1 SPRAY, METERED NASAL 2 TIMES DAILY
Qty: 30 ML | Refills: 11 | Status: SHIPPED | OUTPATIENT
Start: 2019-06-17 | End: 2022-08-09

## 2019-07-10 ENCOUNTER — PATIENT MESSAGE (OUTPATIENT)
Dept: INTERNAL MEDICINE | Facility: CLINIC | Age: 59
End: 2019-07-10

## 2019-07-17 ENCOUNTER — OFFICE VISIT (OUTPATIENT)
Dept: HEMATOLOGY/ONCOLOGY | Facility: CLINIC | Age: 59
End: 2019-07-17
Payer: COMMERCIAL

## 2019-07-17 ENCOUNTER — LAB VISIT (OUTPATIENT)
Dept: LAB | Facility: OTHER | Age: 59
End: 2019-07-17
Attending: INTERNAL MEDICINE
Payer: COMMERCIAL

## 2019-07-17 VITALS
BODY MASS INDEX: 32.66 KG/M2 | TEMPERATURE: 97 F | WEIGHT: 177.5 LBS | HEIGHT: 62 IN | SYSTOLIC BLOOD PRESSURE: 138 MMHG | RESPIRATION RATE: 16 BRPM | DIASTOLIC BLOOD PRESSURE: 82 MMHG | OXYGEN SATURATION: 96 % | HEART RATE: 74 BPM

## 2019-07-17 DIAGNOSIS — D50.8 OTHER IRON DEFICIENCY ANEMIA: ICD-10-CM

## 2019-07-17 DIAGNOSIS — D50.8 OTHER IRON DEFICIENCY ANEMIA: Primary | ICD-10-CM

## 2019-07-17 DIAGNOSIS — Z98.84 HISTORY OF ROUX-EN-Y GASTRIC BYPASS: ICD-10-CM

## 2019-07-17 DIAGNOSIS — E55.9 VITAMIN D DEFICIENCY: ICD-10-CM

## 2019-07-17 LAB
25(OH)D3+25(OH)D2 SERPL-MCNC: 26 NG/ML (ref 30–96)
BASOPHILS # BLD AUTO: 0.04 K/UL (ref 0–0.2)
BASOPHILS NFR BLD: 0.5 % (ref 0–1.9)
DIFFERENTIAL METHOD: ABNORMAL
EOSINOPHIL # BLD AUTO: 0 K/UL (ref 0–0.5)
EOSINOPHIL NFR BLD: 0.4 % (ref 0–8)
ERYTHROCYTE [DISTWIDTH] IN BLOOD BY AUTOMATED COUNT: 12.6 % (ref 11.5–14.5)
FERRITIN SERPL-MCNC: 168 NG/ML (ref 20–300)
HCT VFR BLD AUTO: 41.1 % (ref 37–48.5)
HGB BLD-MCNC: 13.8 G/DL (ref 12–16)
IMM GRANULOCYTES # BLD AUTO: 0.05 K/UL (ref 0–0.04)
IMM GRANULOCYTES NFR BLD AUTO: 0.6 % (ref 0–0.5)
IRON SERPL-MCNC: 98 UG/DL (ref 30–160)
LYMPHOCYTES # BLD AUTO: 2.5 K/UL (ref 1–4.8)
LYMPHOCYTES NFR BLD: 30.7 % (ref 18–48)
MCH RBC QN AUTO: 30.1 PG (ref 27–31)
MCHC RBC AUTO-ENTMCNC: 33.6 G/DL (ref 32–36)
MCV RBC AUTO: 90 FL (ref 82–98)
MONOCYTES # BLD AUTO: 0.5 K/UL (ref 0.3–1)
MONOCYTES NFR BLD: 6.2 % (ref 4–15)
NEUTROPHILS # BLD AUTO: 5 K/UL (ref 1.8–7.7)
NEUTROPHILS NFR BLD: 61.6 % (ref 38–73)
NRBC BLD-RTO: 0 /100 WBC
PLATELET # BLD AUTO: 235 K/UL (ref 150–350)
PMV BLD AUTO: 10.3 FL (ref 9.2–12.9)
RBC # BLD AUTO: 4.59 M/UL (ref 4–5.4)
SATURATED IRON: 28 % (ref 20–50)
TOTAL IRON BINDING CAPACITY: 352 UG/DL (ref 250–450)
TRANSFERRIN SERPL-MCNC: 238 MG/DL (ref 200–375)
WBC # BLD AUTO: 8.05 K/UL (ref 3.9–12.7)

## 2019-07-17 PROCEDURE — 82306 VITAMIN D 25 HYDROXY: CPT

## 2019-07-17 PROCEDURE — 99212 OFFICE O/P EST SF 10 MIN: CPT | Mod: S$GLB,,, | Performed by: INTERNAL MEDICINE

## 2019-07-17 PROCEDURE — 99999 PR PBB SHADOW E&M-EST. PATIENT-LVL III: ICD-10-PCS | Mod: PBBFAC,,, | Performed by: INTERNAL MEDICINE

## 2019-07-17 PROCEDURE — 3008F BODY MASS INDEX DOCD: CPT | Mod: CPTII,S$GLB,, | Performed by: INTERNAL MEDICINE

## 2019-07-17 PROCEDURE — 36415 COLL VENOUS BLD VENIPUNCTURE: CPT

## 2019-07-17 PROCEDURE — 3079F PR MOST RECENT DIASTOLIC BLOOD PRESSURE 80-89 MM HG: ICD-10-PCS | Mod: CPTII,S$GLB,, | Performed by: INTERNAL MEDICINE

## 2019-07-17 PROCEDURE — 99999 PR PBB SHADOW E&M-EST. PATIENT-LVL III: CPT | Mod: PBBFAC,,, | Performed by: INTERNAL MEDICINE

## 2019-07-17 PROCEDURE — 82728 ASSAY OF FERRITIN: CPT

## 2019-07-17 PROCEDURE — 85025 COMPLETE CBC W/AUTO DIFF WBC: CPT

## 2019-07-17 PROCEDURE — 3008F PR BODY MASS INDEX (BMI) DOCUMENTED: ICD-10-PCS | Mod: CPTII,S$GLB,, | Performed by: INTERNAL MEDICINE

## 2019-07-17 PROCEDURE — 3075F PR MOST RECENT SYSTOLIC BLOOD PRESS GE 130-139MM HG: ICD-10-PCS | Mod: CPTII,S$GLB,, | Performed by: INTERNAL MEDICINE

## 2019-07-17 PROCEDURE — 99212 PR OFFICE/OUTPT VISIT, EST, LEVL II, 10-19 MIN: ICD-10-PCS | Mod: S$GLB,,, | Performed by: INTERNAL MEDICINE

## 2019-07-17 PROCEDURE — 3079F DIAST BP 80-89 MM HG: CPT | Mod: CPTII,S$GLB,, | Performed by: INTERNAL MEDICINE

## 2019-07-17 PROCEDURE — 83540 ASSAY OF IRON: CPT

## 2019-07-17 PROCEDURE — 3075F SYST BP GE 130 - 139MM HG: CPT | Mod: CPTII,S$GLB,, | Performed by: INTERNAL MEDICINE

## 2019-07-17 NOTE — PROGRESS NOTES
Subjective:       Patient ID: Trupti Coronado is a 59 y.o. female.     Chief Complaint: follow up for iron deficiency anemia     Diagnosis:  Iron deficiency anemia     Hematologic History:  1. Ms Coronado is a very pleasant 57 yo woman with history of gastric bypass in 2012 who initially saw me on 1/18/19 for further evaluation of anemia. Her ferritin has been low in the 3-5 range since Sep 2017. CBC on 12/17/18 showed WBC 7.48, Hb 11.6, MCV 80, plt count 256. Ferritin 5, iron 78, TIBC 522, iron saturation 15%. She presents today for further evaluation. Has fatigue. Take a multivitamin a day which contains 18 mg of iron. Cannot tolerate higher dose of oral iron due to GI upset and constipation. Colonoscopy on 11/19/2017 at [facility] showed diverticulosis, otherwise normal. She takes vit B12 injection monthly at home. workup in 1/2019 showed normal hapto, LDH, neg SPEP/IFX, stool occult blood is negative.   2. Patient received injectafer x 2 in Jan 2019.      Interval History:   Ms Coronado returns for follow up. Feeling well. No complaints.         ROS:   A ten-point system review is obtained and negative except for what was stated in the Interval History.      Physical Examination:   Vital signs reviewed.   General: well hydrated, well developed, in no acute distress  HEENT: normocephalic, PERRLA, EOMI, anicteric sclerae, oropharynx clear  Neck: supple, no JVD, thyromegaly, cervical or supraclavicular lymphadenopathy  Lungs: clear breath sounds bilaterally, no wheezing, rales, or rhonchi  Heart: RRR, no M/R/G  Abdomen: soft, no tenderness, non-distended, no hepatosplenomegaly, mass, or hernia. BS present  Extremities: no clubbing, cyanosis, or edema  Skin: no rash, ulcer, or open wounds  Neuro: alert and oriented x 4, no focal neuro deficit  Psych: pleasant and appropriate mood and affect     Objective:      Laboratory Data:  Labs reviewed. WBC 8.05, Hb 13.8, plt count 235, iron labs pending           Assessment and Plan:       1. Other iron deficiency anemia    2. History of Tatyana-en-Y gastric bypass        1.2  - Ms Coronado is a 58 yo woman with chronic CHAIRS. Previous colonoscopy neg. Stool occult blood neg  - s/p injectafer in Jan 2019  - Hb normal.  - will f/u on iron labs.   - she has a very high copay to see me, and prefers to follow with PCP. She can continue to follow up with Dr Barrera. Need CBC, iron/TIBC, ferritin, every 6 months to monitor  - knows to call should issues arise

## 2019-07-18 ENCOUNTER — TELEPHONE (OUTPATIENT)
Dept: INTERNAL MEDICINE | Facility: CLINIC | Age: 59
End: 2019-07-18

## 2019-07-18 DIAGNOSIS — E55.9 VITAMIN D DEFICIENCY: Primary | ICD-10-CM

## 2019-07-18 RX ORDER — ERGOCALCIFEROL 1.25 MG/1
CAPSULE ORAL
Qty: 24 CAPSULE | Refills: 1 | Status: SHIPPED | OUTPATIENT
Start: 2019-07-18 | End: 2020-01-23

## 2019-07-18 NOTE — TELEPHONE ENCOUNTER
Message sent to pt via my chart with lab results and updates to plan.   Please arrange vit d in 6 months

## 2019-07-22 RX ORDER — ERGOCALCIFEROL 1.25 MG/1
CAPSULE ORAL
Qty: 30 CAPSULE | Refills: 1 | OUTPATIENT
Start: 2019-07-22

## 2019-10-09 ENCOUNTER — TELEPHONE (OUTPATIENT)
Dept: INTERNAL MEDICINE | Facility: CLINIC | Age: 59
End: 2019-10-09
Payer: COMMERCIAL

## 2019-10-09 ENCOUNTER — CLINICAL SUPPORT (OUTPATIENT)
Dept: INTERNAL MEDICINE | Facility: CLINIC | Age: 59
End: 2019-10-09
Payer: COMMERCIAL

## 2019-10-09 ENCOUNTER — PATIENT MESSAGE (OUTPATIENT)
Dept: INTERNAL MEDICINE | Facility: CLINIC | Age: 59
End: 2019-10-09

## 2019-10-09 DIAGNOSIS — Z23 NEED FOR VACCINATION: Primary | ICD-10-CM

## 2019-10-09 PROCEDURE — 90472 PNEUMOCOCCAL POLYSACCHARIDE VACCINE 23-VALENT =>2YO SQ IM: ICD-10-PCS | Mod: S$GLB,,, | Performed by: INTERNAL MEDICINE

## 2019-10-09 PROCEDURE — 90471 FLU VACCINE (QUAD) GREATER THAN OR EQUAL TO 3YO PRESERVATIVE FREE IM: ICD-10-PCS | Mod: S$GLB,,, | Performed by: INTERNAL MEDICINE

## 2019-10-09 PROCEDURE — 90686 FLU VACCINE (QUAD) GREATER THAN OR EQUAL TO 3YO PRESERVATIVE FREE IM: ICD-10-PCS | Mod: S$GLB,,, | Performed by: INTERNAL MEDICINE

## 2019-10-09 PROCEDURE — 90472 IMMUNIZATION ADMIN EACH ADD: CPT | Mod: S$GLB,,, | Performed by: INTERNAL MEDICINE

## 2019-10-09 PROCEDURE — 90471 IMMUNIZATION ADMIN: CPT | Mod: S$GLB,,, | Performed by: INTERNAL MEDICINE

## 2019-10-09 PROCEDURE — 90732 PPSV23 VACC 2 YRS+ SUBQ/IM: CPT | Mod: S$GLB,,, | Performed by: INTERNAL MEDICINE

## 2019-10-09 PROCEDURE — 90686 IIV4 VACC NO PRSV 0.5 ML IM: CPT | Mod: S$GLB,,, | Performed by: INTERNAL MEDICINE

## 2019-10-09 PROCEDURE — 90732 PNEUMOCOCCAL POLYSACCHARIDE VACCINE 23-VALENT =>2YO SQ IM: ICD-10-PCS | Mod: S$GLB,,, | Performed by: INTERNAL MEDICINE

## 2019-10-09 NOTE — PROGRESS NOTES
"Patient was given vaccine information sheet for the Flu Vaccine. The area of injection was palpated using the acromion process as a landmark. This area was cleaned with alcohol. Using a 25g 1" safety needle, 0.5mL of the vaccine was placed into the right deltoid muscle. The injection site was dressed with a bandage. Patient experienced no complications and was discharged in stable condition. Fluarix vaccine Lot: [id number] Exp: 06/30/2020    Patient was given vaccine information sheet for the [id number] (pneumococcal polyvalent) vaccine. The area of injection was palpated using the acromion process as a landmark. This area was cleaned with alcohol. Using a 25g 1" safety needle, 0.5mL of the vaccine was placed into the left deltoid muscle. The injection site was dressed with a bandage. Patient experienced no complications and was discharged in stable condition. Pneumovax 23 (pneumococcal polyvalent) vaccine Lot: [id number] Exp: [id number]  "

## 2019-11-12 ENCOUNTER — OFFICE VISIT (OUTPATIENT)
Dept: INTERNAL MEDICINE | Facility: CLINIC | Age: 59
End: 2019-11-12
Attending: INTERNAL MEDICINE
Payer: COMMERCIAL

## 2019-11-12 VITALS
WEIGHT: 181.19 LBS | HEART RATE: 69 BPM | HEIGHT: 62 IN | BODY MASS INDEX: 33.34 KG/M2 | OXYGEN SATURATION: 97 % | SYSTOLIC BLOOD PRESSURE: 123 MMHG | DIASTOLIC BLOOD PRESSURE: 60 MMHG

## 2019-11-12 DIAGNOSIS — D50.9 IRON DEFICIENCY ANEMIA, UNSPECIFIED IRON DEFICIENCY ANEMIA TYPE: ICD-10-CM

## 2019-11-12 DIAGNOSIS — E55.9 VITAMIN D DEFICIENCY: ICD-10-CM

## 2019-11-12 DIAGNOSIS — E53.8 B12 DEFICIENCY: ICD-10-CM

## 2019-11-12 DIAGNOSIS — Z98.84 HISTORY OF ROUX-EN-Y GASTRIC BYPASS: ICD-10-CM

## 2019-11-12 DIAGNOSIS — Z00.00 ANNUAL PHYSICAL EXAM: Primary | ICD-10-CM

## 2019-11-12 DIAGNOSIS — G25.0 TREMOR, ESSENTIAL: ICD-10-CM

## 2019-11-12 PROCEDURE — 99396 PR PREVENTIVE VISIT,EST,40-64: ICD-10-PCS | Mod: S$GLB,,, | Performed by: INTERNAL MEDICINE

## 2019-11-12 PROCEDURE — 3074F PR MOST RECENT SYSTOLIC BLOOD PRESSURE < 130 MM HG: ICD-10-PCS | Mod: CPTII,S$GLB,, | Performed by: INTERNAL MEDICINE

## 2019-11-12 PROCEDURE — 99396 PREV VISIT EST AGE 40-64: CPT | Mod: S$GLB,,, | Performed by: INTERNAL MEDICINE

## 2019-11-12 PROCEDURE — 3078F PR MOST RECENT DIASTOLIC BLOOD PRESSURE < 80 MM HG: ICD-10-PCS | Mod: CPTII,S$GLB,, | Performed by: INTERNAL MEDICINE

## 2019-11-12 PROCEDURE — 99999 PR PBB SHADOW E&M-EST. PATIENT-LVL III: ICD-10-PCS | Mod: PBBFAC,,, | Performed by: INTERNAL MEDICINE

## 2019-11-12 PROCEDURE — 3074F SYST BP LT 130 MM HG: CPT | Mod: CPTII,S$GLB,, | Performed by: INTERNAL MEDICINE

## 2019-11-12 PROCEDURE — 3078F DIAST BP <80 MM HG: CPT | Mod: CPTII,S$GLB,, | Performed by: INTERNAL MEDICINE

## 2019-11-12 PROCEDURE — 99999 PR PBB SHADOW E&M-EST. PATIENT-LVL III: CPT | Mod: PBBFAC,,, | Performed by: INTERNAL MEDICINE

## 2019-11-12 NOTE — PROGRESS NOTES
"Subjective:   Patient ID: Trupti Coronado is a 59 y.o. female  Chief complaint:   Chief Complaint   Patient presents with    Annual Exam       HPI  Here for annual exam     Iron deficiency and B12 def 2/2 to gastric surgery:   - Had iron transfusion and felt immediately better   - followed by h/o for this   - last b12 inj was in Jan 2019 when level 1294 and pt stopped this      Vit d def:   Level 26 <- 23 <-20  - taking rx dose 2-3 times per week and a daily MVI      Essential tremor:   Moved and quit going to the gym and is planning to resume going to gyn now that house is under construction   - seen by neuro previously  - currently still declines meds and surgery     Gyn:   Followed by Dr. Jen Copeland in [place] and having annual mmg in Dec through her    Review of Systems    Objective:  Vitals:    11/12/19 1113   BP: 123/60   Pulse: 69   SpO2: 97%   Weight: 82.2 kg (181 lb 3.5 oz)   Height: 5' 2" (1.575 m)     Body mass index is 33.15 kg/m².    Physical Exam   Constitutional: She is oriented to person, place, and time. She appears well-developed and well-nourished.   HENT:   Head: Normocephalic and atraumatic.   Right Ear: External ear normal.   Left Ear: External ear normal.   Nose: Nose normal.   Mouth/Throat: Oropharynx is clear and moist. No oropharyngeal exudate.   No carotid bruits   Eyes: Conjunctivae and EOM are normal.   Neck: Neck supple. No thyromegaly present.   Cardiovascular: Normal rate, regular rhythm, normal heart sounds and intact distal pulses.   Pulmonary/Chest: Effort normal and breath sounds normal.   Abdominal: Soft. Bowel sounds are normal.   Musculoskeletal: She exhibits no edema or tenderness.   Lymphadenopathy:     She has no cervical adenopathy.   Neurological: She is alert and oriented to person, place, and time.   Skin: Skin is warm and dry.   Psychiatric: Her behavior is normal. Thought content normal.   Vitals reviewed.      Assessment:  1. Annual physical exam    2. Iron " deficiency anemia, unspecified iron deficiency anemia type    3. B12 deficiency    4. History of Tatyana-en-Y gastric bypass    5. Vitamin D deficiency    6. Tremor, essential        Plan:  Trupti was seen today for annual exam.    Diagnoses and all orders for this visit:    Annual physical exam  -     Vitamin B12; Future  -     Ferritin; Future  -     Iron and TIBC; Future  -     CBC auto differential; Future  -     Hemoglobin A1c; Future  -     TSH; Future  -     Lipid panel; Future  -     Comprehensive metabolic panel; Future  Recommend daily sunscreen, cardiovascular exercise min 30 min 5 days per week. Seatbelts routinely.    Iron deficiency anemia, unspecified iron deficiency anemia type  -     Ferritin; Future  -     Iron and TIBC; Future  -     CBC auto differential; Future    B12 deficiency  -     Vitamin B12; Future    History of Tatyana-en-Y gastric bypass    Vitamin D deficiency    Tremor, essential    Continue supplements and check appropriate labs  Will let me know if free considers medication or other treatment options for her essential tremor which is otherwise stable - previously was seen by neuro  Recommend daily sunscreen, cardiovascular exercise min 30 min 5 days per week. Seatbelts routinely.    Health Maintenance   Topic Date Due    Pap Smear with HPV Cotest  01/16/2020    Mammogram  12/03/2020    Lipid Panel  08/16/2023    Colonoscopy  11/29/2027    TETANUS VACCINE  09/18/2028    Hepatitis C Screening  Completed

## 2019-11-27 ENCOUNTER — LAB VISIT (OUTPATIENT)
Dept: LAB | Facility: OTHER | Age: 59
End: 2019-11-27
Attending: INTERNAL MEDICINE
Payer: COMMERCIAL

## 2019-11-27 DIAGNOSIS — D50.9 IRON DEFICIENCY ANEMIA, UNSPECIFIED IRON DEFICIENCY ANEMIA TYPE: ICD-10-CM

## 2019-11-27 DIAGNOSIS — E53.8 B12 DEFICIENCY: ICD-10-CM

## 2019-11-27 DIAGNOSIS — Z00.00 ANNUAL PHYSICAL EXAM: ICD-10-CM

## 2019-11-27 LAB
ALBUMIN SERPL BCP-MCNC: 4 G/DL (ref 3.5–5.2)
ALP SERPL-CCNC: 71 U/L (ref 55–135)
ALT SERPL W/O P-5'-P-CCNC: 23 U/L (ref 10–44)
ANION GAP SERPL CALC-SCNC: 9 MMOL/L (ref 8–16)
AST SERPL-CCNC: 21 U/L (ref 10–40)
BASOPHILS # BLD AUTO: 0.04 K/UL (ref 0–0.2)
BASOPHILS NFR BLD: 0.7 % (ref 0–1.9)
BILIRUB SERPL-MCNC: 0.6 MG/DL (ref 0.1–1)
BUN SERPL-MCNC: 9 MG/DL (ref 8–23)
CALCIUM SERPL-MCNC: 9 MG/DL (ref 8.7–10.5)
CHLORIDE SERPL-SCNC: 106 MMOL/L (ref 95–110)
CHOLEST SERPL-MCNC: 217 MG/DL (ref 120–199)
CHOLEST/HDLC SERPL: 3.1 {RATIO} (ref 2–5)
CO2 SERPL-SCNC: 27 MMOL/L (ref 23–29)
CREAT SERPL-MCNC: 0.7 MG/DL (ref 0.5–1.4)
DIFFERENTIAL METHOD: ABNORMAL
EOSINOPHIL # BLD AUTO: 0.1 K/UL (ref 0–0.5)
EOSINOPHIL NFR BLD: 0.8 % (ref 0–8)
ERYTHROCYTE [DISTWIDTH] IN BLOOD BY AUTOMATED COUNT: 12.6 % (ref 11.5–14.5)
EST. GFR  (AFRICAN AMERICAN): >60 ML/MIN/1.73 M^2
EST. GFR  (NON AFRICAN AMERICAN): >60 ML/MIN/1.73 M^2
ESTIMATED AVG GLUCOSE: 103 MG/DL (ref 68–131)
FERRITIN SERPL-MCNC: 158 NG/ML (ref 20–300)
GLUCOSE SERPL-MCNC: 98 MG/DL (ref 70–110)
HBA1C MFR BLD HPLC: 5.2 % (ref 4–5.6)
HCT VFR BLD AUTO: 41.4 % (ref 37–48.5)
HDLC SERPL-MCNC: 70 MG/DL (ref 40–75)
HDLC SERPL: 32.3 % (ref 20–50)
HGB BLD-MCNC: 13.6 G/DL (ref 12–16)
IMM GRANULOCYTES # BLD AUTO: 0.05 K/UL (ref 0–0.04)
IMM GRANULOCYTES NFR BLD AUTO: 0.8 % (ref 0–0.5)
IRON SERPL-MCNC: 113 UG/DL (ref 30–160)
LDLC SERPL CALC-MCNC: 128 MG/DL (ref 63–159)
LYMPHOCYTES # BLD AUTO: 2.2 K/UL (ref 1–4.8)
LYMPHOCYTES NFR BLD: 36.7 % (ref 18–48)
MCH RBC QN AUTO: 29.2 PG (ref 27–31)
MCHC RBC AUTO-ENTMCNC: 32.9 G/DL (ref 32–36)
MCV RBC AUTO: 89 FL (ref 82–98)
MONOCYTES # BLD AUTO: 0.4 K/UL (ref 0.3–1)
MONOCYTES NFR BLD: 6.4 % (ref 4–15)
NEUTROPHILS # BLD AUTO: 3.2 K/UL (ref 1.8–7.7)
NEUTROPHILS NFR BLD: 54.6 % (ref 38–73)
NONHDLC SERPL-MCNC: 147 MG/DL
NRBC BLD-RTO: 0 /100 WBC
PLATELET # BLD AUTO: 223 K/UL (ref 150–350)
PMV BLD AUTO: 11.1 FL (ref 9.2–12.9)
POTASSIUM SERPL-SCNC: 4.2 MMOL/L (ref 3.5–5.1)
PROT SERPL-MCNC: 6.6 G/DL (ref 6–8.4)
RBC # BLD AUTO: 4.66 M/UL (ref 4–5.4)
SATURATED IRON: 34 % (ref 20–50)
SODIUM SERPL-SCNC: 142 MMOL/L (ref 136–145)
TOTAL IRON BINDING CAPACITY: 336 UG/DL (ref 250–450)
TRANSFERRIN SERPL-MCNC: 227 MG/DL (ref 200–375)
TRIGL SERPL-MCNC: 95 MG/DL (ref 30–150)
TSH SERPL DL<=0.005 MIU/L-ACNC: 1.66 UIU/ML (ref 0.4–4)
VIT B12 SERPL-MCNC: 706 PG/ML (ref 210–950)
WBC # BLD AUTO: 5.94 K/UL (ref 3.9–12.7)

## 2019-11-27 PROCEDURE — 83540 ASSAY OF IRON: CPT

## 2019-11-27 PROCEDURE — 80061 LIPID PANEL: CPT

## 2019-11-27 PROCEDURE — 82607 VITAMIN B-12: CPT

## 2019-11-27 PROCEDURE — 84443 ASSAY THYROID STIM HORMONE: CPT

## 2019-11-27 PROCEDURE — 85025 COMPLETE CBC W/AUTO DIFF WBC: CPT

## 2019-11-27 PROCEDURE — 83036 HEMOGLOBIN GLYCOSYLATED A1C: CPT

## 2019-11-27 PROCEDURE — 80053 COMPREHEN METABOLIC PANEL: CPT

## 2019-11-27 PROCEDURE — 82728 ASSAY OF FERRITIN: CPT

## 2019-11-27 PROCEDURE — 36415 COLL VENOUS BLD VENIPUNCTURE: CPT

## 2020-01-23 RX ORDER — ERGOCALCIFEROL 1.25 MG/1
CAPSULE ORAL
Qty: 24 CAPSULE | Refills: 1 | Status: SHIPPED | OUTPATIENT
Start: 2020-01-23 | End: 2021-08-25

## 2020-05-20 ENCOUNTER — LAB VISIT (OUTPATIENT)
Dept: LAB | Facility: OTHER | Age: 60
End: 2020-05-20
Attending: INTERNAL MEDICINE
Payer: COMMERCIAL

## 2020-05-20 ENCOUNTER — OFFICE VISIT (OUTPATIENT)
Dept: INTERNAL MEDICINE | Facility: CLINIC | Age: 60
End: 2020-05-20
Attending: INTERNAL MEDICINE
Payer: COMMERCIAL

## 2020-05-20 VITALS
DIASTOLIC BLOOD PRESSURE: 84 MMHG | OXYGEN SATURATION: 97 % | WEIGHT: 186.75 LBS | SYSTOLIC BLOOD PRESSURE: 124 MMHG | HEART RATE: 78 BPM | HEIGHT: 62 IN | BODY MASS INDEX: 34.37 KG/M2

## 2020-05-20 DIAGNOSIS — E55.9 VITAMIN D DEFICIENCY: ICD-10-CM

## 2020-05-20 DIAGNOSIS — I86.8 SPIDER VARICOSE VEIN: ICD-10-CM

## 2020-05-20 DIAGNOSIS — D50.8 OTHER IRON DEFICIENCY ANEMIA: ICD-10-CM

## 2020-05-20 DIAGNOSIS — Z20.822 EXPOSURE TO COVID-19 VIRUS: ICD-10-CM

## 2020-05-20 DIAGNOSIS — G25.0 ESSENTIAL TREMOR: ICD-10-CM

## 2020-05-20 DIAGNOSIS — Z77.21 HISTORY OF EXPOSURE TO HAZARDOUS BODILY FLUIDS: ICD-10-CM

## 2020-05-20 DIAGNOSIS — D50.8 OTHER IRON DEFICIENCY ANEMIA: Primary | ICD-10-CM

## 2020-05-20 LAB
25(OH)D3+25(OH)D2 SERPL-MCNC: 30 NG/ML (ref 30–96)
BASOPHILS # BLD AUTO: 0.04 K/UL (ref 0–0.2)
BASOPHILS NFR BLD: 0.6 % (ref 0–1.9)
DIFFERENTIAL METHOD: ABNORMAL
EOSINOPHIL # BLD AUTO: 0 K/UL (ref 0–0.5)
EOSINOPHIL NFR BLD: 0.5 % (ref 0–8)
ERYTHROCYTE [DISTWIDTH] IN BLOOD BY AUTOMATED COUNT: 12.5 % (ref 11.5–14.5)
FERRITIN SERPL-MCNC: 72 NG/ML (ref 20–300)
HCT VFR BLD AUTO: 41.3 % (ref 37–48.5)
HGB BLD-MCNC: 13.4 G/DL (ref 12–16)
IMM GRANULOCYTES # BLD AUTO: 0.06 K/UL (ref 0–0.04)
IMM GRANULOCYTES NFR BLD AUTO: 0.9 % (ref 0–0.5)
IRON SERPL-MCNC: 82 UG/DL (ref 30–160)
LYMPHOCYTES # BLD AUTO: 2.3 K/UL (ref 1–4.8)
LYMPHOCYTES NFR BLD: 36.5 % (ref 18–48)
MCH RBC QN AUTO: 29.3 PG (ref 27–31)
MCHC RBC AUTO-ENTMCNC: 32.4 G/DL (ref 32–36)
MCV RBC AUTO: 90 FL (ref 82–98)
MONOCYTES # BLD AUTO: 0.5 K/UL (ref 0.3–1)
MONOCYTES NFR BLD: 7.1 % (ref 4–15)
NEUTROPHILS # BLD AUTO: 3.5 K/UL (ref 1.8–7.7)
NEUTROPHILS NFR BLD: 54.4 % (ref 38–73)
NRBC BLD-RTO: 0 /100 WBC
PLATELET # BLD AUTO: 244 K/UL (ref 150–350)
PMV BLD AUTO: 10.1 FL (ref 9.2–12.9)
RBC # BLD AUTO: 4.58 M/UL (ref 4–5.4)
SARS-COV-2 IGG SERPLBLD QL IA.RAPID: NEGATIVE
SATURATED IRON: 20 % (ref 20–50)
TOTAL IRON BINDING CAPACITY: 401 UG/DL (ref 250–450)
TRANSFERRIN SERPL-MCNC: 271 MG/DL (ref 200–375)
WBC # BLD AUTO: 6.38 K/UL (ref 3.9–12.7)

## 2020-05-20 PROCEDURE — 99999 PR PBB SHADOW E&M-EST. PATIENT-LVL IV: ICD-10-PCS | Mod: PBBFAC,,, | Performed by: INTERNAL MEDICINE

## 2020-05-20 PROCEDURE — 3008F BODY MASS INDEX DOCD: CPT | Mod: CPTII,S$GLB,, | Performed by: INTERNAL MEDICINE

## 2020-05-20 PROCEDURE — 36415 COLL VENOUS BLD VENIPUNCTURE: CPT

## 2020-05-20 PROCEDURE — 99214 OFFICE O/P EST MOD 30 MIN: CPT | Mod: S$GLB,,, | Performed by: INTERNAL MEDICINE

## 2020-05-20 PROCEDURE — 86769 SARS-COV-2 COVID-19 ANTIBODY: CPT

## 2020-05-20 PROCEDURE — 3008F PR BODY MASS INDEX (BMI) DOCUMENTED: ICD-10-PCS | Mod: CPTII,S$GLB,, | Performed by: INTERNAL MEDICINE

## 2020-05-20 PROCEDURE — 3074F SYST BP LT 130 MM HG: CPT | Mod: CPTII,S$GLB,, | Performed by: INTERNAL MEDICINE

## 2020-05-20 PROCEDURE — 82306 VITAMIN D 25 HYDROXY: CPT

## 2020-05-20 PROCEDURE — 3079F DIAST BP 80-89 MM HG: CPT | Mod: CPTII,S$GLB,, | Performed by: INTERNAL MEDICINE

## 2020-05-20 PROCEDURE — 82728 ASSAY OF FERRITIN: CPT

## 2020-05-20 PROCEDURE — 83540 ASSAY OF IRON: CPT

## 2020-05-20 PROCEDURE — 85025 COMPLETE CBC W/AUTO DIFF WBC: CPT

## 2020-05-20 PROCEDURE — 99214 PR OFFICE/OUTPT VISIT, EST, LEVL IV, 30-39 MIN: ICD-10-PCS | Mod: S$GLB,,, | Performed by: INTERNAL MEDICINE

## 2020-05-20 PROCEDURE — 3074F PR MOST RECENT SYSTOLIC BLOOD PRESSURE < 130 MM HG: ICD-10-PCS | Mod: CPTII,S$GLB,, | Performed by: INTERNAL MEDICINE

## 2020-05-20 PROCEDURE — 99999 PR PBB SHADOW E&M-EST. PATIENT-LVL IV: CPT | Mod: PBBFAC,,, | Performed by: INTERNAL MEDICINE

## 2020-05-20 PROCEDURE — 3079F PR MOST RECENT DIASTOLIC BLOOD PRESSURE 80-89 MM HG: ICD-10-PCS | Mod: CPTII,S$GLB,, | Performed by: INTERNAL MEDICINE

## 2020-05-20 RX ORDER — SYRINGE, DISPOSABLE, 3 ML
1 SYRINGE, EMPTY DISPOSABLE MISCELLANEOUS
Qty: 25 EACH | Refills: 3 | Status: SHIPPED | OUTPATIENT
Start: 2020-05-20 | End: 2022-08-09 | Stop reason: SDUPTHER

## 2020-05-20 NOTE — PROGRESS NOTES
"Subjective:   Patient ID: Trupti Coronado is a 60 y.o. female  Chief complaint:   Chief Complaint   Patient presents with    Knee Pain     left knee; has spider veins; would like referral to vasc; seems to be worst at night    Tremors       HPI  Here for urgent care appt   - has hx of varicose veins and would like referral to vascular to follow up on this   - has periods when gets pressure and pain in left post knee - asymptomatic today   - had US last year for same symptoms and neg for clot   - nature and frequency of sx unchanged today   - no redness or inc warmth   - no cp or sob     Had left breast bx and negative in [place] at [facility] - benign!     - daughter just returned from [place] after left to study abroad and session cancelled due to pandemic   - pt and  cancelled month long trip to [place]     Hx of gastric surgery, b12 def, CHARIS, vit d def:  - last cbc wnl and ferritin 158  - B12 706 - nancy monthly injectiosn  - Vit d 26  - taking oral iron but limited to taking lower dose due to constipation   - seen by h/o and had received injectafer x 2 in Jan 2019  - plan is to check cbc, ferritin, tibc q6 months     Essential tremor:   - affects ability to write and bothersome at this time   - seen by neuro previously  - previously declined meds but discussed considering trial to see if tolerates if manages sx       Review of Systems    Objective:  Vitals:    05/20/20 0808   BP: 124/84   BP Location: Right arm   Patient Position: Sitting   Pulse: 78   SpO2: 97%   Weight: 84.7 kg (186 lb 11.7 oz)   Height: 5' 2" (1.575 m)     Body mass index is 34.15 kg/m².    Physical Exam   Constitutional: She is oriented to person, place, and time. She appears well-developed and well-nourished.   HENT:   Head: Normocephalic and atraumatic.   Eyes: Conjunctivae and EOM are normal.   Neck: Normal range of motion. Neck supple.   Cardiovascular: Normal rate, regular rhythm and intact distal pulses.   Pulmonary/Chest: " Effort normal and breath sounds normal.   Abdominal: Soft. Normal appearance and bowel sounds are normal.   Musculoskeletal: She exhibits no edema or tenderness.   Neurological: She is alert and oriented to person, place, and time. She has normal strength. Gait normal.   Skin: Skin is warm, dry and intact. No cyanosis. Nails show no clubbing.   + varicose vv B  No calf ttp or inc warmth or redness   Psychiatric: She has a normal mood and affect. Her speech is normal and behavior is normal. Cognition and memory are normal.   Vitals reviewed.    Assessment:  1. Other iron deficiency anemia    2. Vitamin D deficiency    3. History of exposure to hazardous bodily fluids    4. Essential tremor    5. Spider varicose vein    6. Exposure to Covid-19 Virus        Plan:  Trupti was seen today for knee pain and tremors.    Diagnoses and all orders for this visit:    Other iron deficiency anemia  -     CBC auto differential; Future  -     Ferritin; Future  -     Iron and TIBC; Future    Vitamin D deficiency  -     Vitamin D; Future    History of exposure to hazardous bodily fluids  -     Cancel: HIV 1/2 Ag/Ab (4th Gen); Future    Essential tremor  -     Ambulatory referral/consult to Neurology; Future    Spider varicose vein  -     Ambulatory referral/consult to Vascular Surgery; Future    Exposure to Covid-19 Virus  -     COVID-19 (SARS CoV-2) IgG Antibody; Future    -     syringe, disposable, 3 mL Syrg; 1 each by Misc.(Non-Drug; Combo Route) route every 30 days. 22 gauge    check labs   Refer to vascular and neuro   Cont meds     Health Maintenance   Topic Date Due    Pap Smear with HPV Cotest  01/16/2020    Mammogram  02/05/2022    Lipid Panel  11/27/2024    Colonoscopy  11/29/2027    TETANUS VACCINE  09/18/2028    Hepatitis C Screening  Completed

## 2020-07-13 RX ORDER — ERGOCALCIFEROL 1.25 MG/1
CAPSULE ORAL
Qty: 24 CAPSULE | Refills: 1 | OUTPATIENT
Start: 2020-07-13

## 2020-07-13 NOTE — TELEPHONE ENCOUNTER
Refill Routing Note     Medication(s) are not appropriate for processing by [facility]:    Medication Outside of Protocol    Appointments  past 12m or future 3m with PCP    Date Provider   Last Visit   5/20/2020 Neena Barrera MD   Next Visit   Visit date not found Neena Barrera MD           Automatic Epic Protocol Generated Data:    Requested Prescriptions   Pending Prescriptions Disp Refills    ergocalciferol (ERGOCALCIFEROL) 50,000 unit Cap [Pharmacy Med Name: VITAMIN D2 1.25MG(50,000 UNIT)] 24 capsule 1     Sig: TAKE ONE DOSE EVERY SUNDAY AND THURSDAY       Endocrinology:  Vitamins - Vitamin D Supplementation Passed - 7/12/2020  2:13 PM        Passed - Patient is at least 18 years old        Passed - Hypercalcemia is not present on problem list        Passed - Office visit in past 12 months or future 90 days.     Recent Outpatient Visits            1 month ago Other iron deficiency anemia    [facility] Neena Barrera MD    8 months ago Annual physical exam    [facility] Neena Barrera MD    12 months ago Other iron deficiency anemia    [facility] Asia Gonzales MD    1 year ago Other iron deficiency anemia    [facility] Asia Gonzales MD    1 year ago Acute pain of left knee    [facility] Neena Barrera MD          Future Appointments              In 2 weeks MD Gianluca Garber - Neurology, [facility]    In 1 month MD Gianluca Varma - Vascular Surgery, [facility]                Passed - Ca in normal range and within 360 days     Calcium   Date Value Ref Range Status   11/27/2019 9.0 8.7 - 10.5 mg/dL Final   08/16/2018 8.7 8.7 - 10.5 mg/dL Final   09/25/2017 9.0 8.7 - 10.5 mg/dL Final              Passed - Vitamin D is 20 or above and within 360 days     Vit D, 25-Hydroxy   Date Value Ref Range Status   05/20/2020 30  30 - 96 ng/mL Final     Comment:     Vitamin D deficiency.........<10 ng/mL                              Vitamin D insufficiency......10-29 ng/mL       Vitamin D sufficiency........> or equal to 30 ng/mL  Vitamin D toxicity............>100 ng/mL     07/17/2019 26 (L) 30 - 96 ng/mL Final     Comment:     Vitamin D deficiency.........<10 ng/mL                              Vitamin D insufficiency......10-29 ng/mL       Vitamin D sufficiency........> or equal to 30 ng/mL  Vitamin D toxicity............>100 ng/mL     03/27/2019 23 (L) 30 - 96 ng/mL Final     Comment:     Vitamin D deficiency.........<10 ng/mL                              Vitamin D insufficiency......10-29 ng/mL       Vitamin D sufficiency........> or equal to 30 ng/mL  Vitamin D toxicity............>100 ng/mL                      Note composed:1:39 PM 07/13/2020

## 2020-07-14 NOTE — TELEPHONE ENCOUNTER
Provider Staff:     Please note denial of medication.     Refused by: Neena Barrera MD  Refusal reason: to start over the counter vit d after rx complete    Thanks!  [facility]     Note composed: 07/14/2020 10:17 AM

## 2020-07-17 ENCOUNTER — TELEPHONE (OUTPATIENT)
Dept: INTERNAL MEDICINE | Facility: CLINIC | Age: 60
End: 2020-07-17

## 2020-07-27 ENCOUNTER — TELEPHONE (OUTPATIENT)
Dept: NEUROLOGY | Facility: CLINIC | Age: 60
End: 2020-07-27

## 2020-07-29 ENCOUNTER — TELEPHONE (OUTPATIENT)
Dept: INTERNAL MEDICINE | Facility: CLINIC | Age: 60
End: 2020-07-29

## 2020-07-29 DIAGNOSIS — I83.93 VARICOSE VEINS OF BOTH LOWER EXTREMITIES, UNSPECIFIED WHETHER COMPLICATED: Primary | ICD-10-CM

## 2020-07-29 DIAGNOSIS — I83.813 VARICOSE VEINS OF LEG WITH PAIN, BILATERAL: Primary | ICD-10-CM

## 2020-07-29 NOTE — TELEPHONE ENCOUNTER
"Ok I will call when I return to clinic   However,    - I looked at chart quickly and saw that referral to vascular was denied due to dx of "spider veins" instead of varicose vv - I will reorder this and adjust dx code - to painful varicose veins - which we discussed in clinic  - new order signed    - if this addressed her concerns then let me know   - if not then I will call upon my return to clinic   "

## 2020-07-29 NOTE — TELEPHONE ENCOUNTER
Spoke with pt, she did not want to disclose reason for calling to me. Requesting phone call from provider. I informed her she is out of office until Friday. Scheduled pt for next available audio visit.    ----- Message from Morgan Waldron sent at 7/29/2020 11:38 AM CDT -----  Regarding: Call  Contact: Patient  Type: Patient Call Back    Who called:PATIENT    What is the request in detail: She is requesting a call back to discuss referral.    Can the clinic reply by [facility]? No    Would the patient rather a call back or a response via [facility]? Call    Best call back number: [phone] (home)     Additional Information:

## 2020-07-29 NOTE — TELEPHONE ENCOUNTER
Spoke with pt and let her know Dr. Barrera's recs. Pt was called the day before her neurology appointment and was told the MD was ill and cancelled appointment. Then the next day gets a call from vascular surgery staff saying they cancelled appointment due to it being scheduled incorrectly and her needing to see dermatology. Pt is very frustrated as she waited 3 months for this appointment. I informed her I will send them a staff message to see what is going on about this and also send to Dr. Barrera to fill her in before she gives pt a call.

## 2020-07-30 NOTE — TELEPHONE ENCOUNTER
----- Message from Emy Sosa MA sent at 7/29/2020  3:54 PM CDT -----  The appointment was put on the schedule with a u/s that is required.    ----- Message -----  From: Alma Pineda MA  Sent: 7/29/2020   3:46 PM CDT  To: ELIZABETH Granados. Does he see varicose veins? If so, can this appointment be placed back on the schedule?  ----- Message -----  From: Emy Sosa MA  Sent: 7/29/2020   3:43 PM CDT  To: Alma Pineda MA    We do not see pt for spider veins. The appointment was scheduled for spider veins , not varicose veins. The provider note states spider veins. This is why the appointment was canceled.   ----- Message -----  From: Alma Pineda MA  Sent: 7/29/2020   3:37 PM CDT  To: [facility] Clinical Support, #    Does Dr. Villalta not deal with varicose veins?? Who in the department would? Patient had an appointment that was canceled and was told she needs to see dermatology.

## 2020-07-31 NOTE — TELEPHONE ENCOUNTER
Called and spoke to pt directly   - appts rescheduled   All questions were answered and pt verbalized understanding of plan.

## 2020-08-06 ENCOUNTER — TELEPHONE (OUTPATIENT)
Dept: INTERNAL MEDICINE | Facility: CLINIC | Age: 60
End: 2020-08-06

## 2020-08-13 ENCOUNTER — HOSPITAL ENCOUNTER (OUTPATIENT)
Dept: VASCULAR SURGERY | Facility: CLINIC | Age: 60
Discharge: HOME | End: 2020-08-13
Attending: SURGERY
Payer: COMMERCIAL

## 2020-08-13 ENCOUNTER — OFFICE VISIT (OUTPATIENT)
Dept: VASCULAR SURGERY | Facility: CLINIC | Age: 60
End: 2020-08-13
Attending: SURGERY
Payer: COMMERCIAL

## 2020-08-13 VITALS
HEART RATE: 74 BPM | SYSTOLIC BLOOD PRESSURE: 144 MMHG | HEIGHT: 62 IN | BODY MASS INDEX: 34.64 KG/M2 | TEMPERATURE: 99 F | WEIGHT: 188.25 LBS | DIASTOLIC BLOOD PRESSURE: 93 MMHG

## 2020-08-13 DIAGNOSIS — I87.2 VENOUS INSUFFICIENCY: ICD-10-CM

## 2020-08-13 DIAGNOSIS — I83.93 VARICOSE VEINS OF BOTH LOWER EXTREMITIES, UNSPECIFIED WHETHER COMPLICATED: ICD-10-CM

## 2020-08-13 DIAGNOSIS — I86.8 SPIDER VARICOSE VEIN: ICD-10-CM

## 2020-08-13 PROCEDURE — 93970 EXTREMITY STUDY: CPT | Mod: S$GLB,,, | Performed by: SURGERY

## 2020-08-13 PROCEDURE — 99999 PR PBB SHADOW E&M-EST. PATIENT-LVL III: ICD-10-PCS | Mod: PBBFAC,,, | Performed by: SURGERY

## 2020-08-13 PROCEDURE — 99244 PR OFFICE CONSULTATION,LEVEL IV: ICD-10-PCS | Mod: S$GLB,,, | Performed by: SURGERY

## 2020-08-13 PROCEDURE — 99999 PR PBB SHADOW E&M-EST. PATIENT-LVL III: CPT | Mod: PBBFAC,,, | Performed by: SURGERY

## 2020-08-13 PROCEDURE — 99244 OFF/OP CNSLTJ NEW/EST MOD 40: CPT | Mod: S$GLB,,, | Performed by: SURGERY

## 2020-08-13 PROCEDURE — 93970 PR US DUPLEX, UPPER OR LOWER EXT VENOUS,COMPLETE BILAT: ICD-10-PCS | Mod: S$GLB,,, | Performed by: SURGERY

## 2020-08-13 NOTE — PROGRESS NOTES
"Trupti Coronado  08/13/2020    HPI:  Patient is a 60 y.o. female who is here today for evaluation of venous insufficiency. Per patient, she has had "spider veins" her whole life and spends a lot of time on her feet. She has had pain over the posterior side of her left knee for the better part of a year and occasionally gets pain over the right posterior knee. She doesn't report much swelling. Has not tried compression stockings to date. Denies history of DVT.       no MI/stroke  Tobacco use: no    No past medical history on file.  Past Surgical History:   Procedure Laterality Date    BREAST RECONSTRUCTION      lift - no implants    COSMETIC SURGERY      FACIAL COSMETIC SURGERY      GASTRIC BYPASS      LIPOSUCTION       Family History   Problem Relation Age of Onset    Heart disease Mother     Hypertension Mother     Hyperlipidemia Mother     Diabetes Mother     Macular degeneration Mother     Stroke Father     Hypertension Father     Hyperlipidemia Father     Peripheral vascular disease Father     Heart disease Father         congenital heart valve defect    Diabetes Sister     Hypertension Sister     Other Sister         fatty liver    Heart disease Brother     No Known Problems Daughter     No Known Problems Daughter     No Known Problems Brother     Other Sister      Social History     Socioeconomic History    Marital status:      Spouse name: Not on file    Number of children: Not on file    Years of education: Not on file    Highest education level: Not on file   Occupational History    Occupation: nurse   Social Needs    Financial resource strain: Not on file    Food insecurity     Worry: Not on file     Inability: Not on file    Transportation needs     Medical: Not on file     Non-medical: Not on file   Tobacco Use    Smoking status: Never Smoker    Smokeless tobacco: Never Used   Substance and Sexual Activity    Alcohol use: Yes     Comment: rare    Drug use: No "    Sexual activity: Never     Partners: Male     Comment:     Lifestyle    Physical activity     Days per week: Not on file     Minutes per session: Not on file    Stress: Not on file   Relationships    Social connections     Talks on phone: Not on file     Gets together: Not on file     Attends Denominational service: Not on file     Active member of club or organization: Not on file     Attends meetings of clubs or organizations: Not on file     Relationship status: Not on file   Other Topics Concern    Not on file   Social History Narrative    Plastic surgery nurse    From [place]    Moved back to [place] 2014 and then moved to [facility] July 2017     Current Outpatient Medications on File Prior to Visit   Medication Sig    azelastine (ASTELIN) 137 mcg (0.1 %) nasal spray 1 SPRAY (137 MCG TOTAL) BY NASAL ROUTE 2 (TWO) TIMES DAILY. (Patient taking differently: 1 spray by Nasal route 2 (two) times daily as needed. )    cholecalciferol, vitamin D3, (VITAMIN D3) 2,000 unit Cap Take 1 capsule (2,000 Units total) by mouth once daily. (Patient not taking: Reported on 5/20/2020)    cyanocobalamin 1,000 mcg/mL injection Inject 1 mL (1,000 mcg total) into the skin every 30 days. (Patient not taking: Reported on 11/12/2019)    ergocalciferol (ERGOCALCIFEROL) 50,000 unit Cap TAKE ONE DOSE EVERY SUNDAY AND THURSDAY (Patient taking differently: every other day. TAKE ONE DOSE EVERY SUNDAY AND THURSDAY)    ferrous gluconate (FERGON) 324 MG tablet Take 1 tablet (324 mg total) by mouth 2 (two) times daily. (Patient not taking: Reported on 11/12/2019)    syringe, disposable, 3 mL Syrg 1 each by Misc.(Non-Drug; Combo Route) route every 30 days. 22 gauge     No current facility-administered medications on file prior to visit.        REVIEW OF SYSTEMS:  General: negative; ENT: negative; Allergy and Immunology: negative; Hematological and Lymphatic: Negative; Endocrine: negative; Respiratory: no cough, shortness of  breath, or wheezing; Cardiovascular: no chest pain or dyspnea on exertion; Gastrointestinal: no abdominal pain/back, change in bowel habits, or bloody stools; Genito-Urinary: no dysuria, trouble voiding, or hematuria; Musculoskeletal: negative  Neurological: no TIA or stroke symptoms    PHYSICAL EXAM:   Left Arm BP - Sittin/93 (20 1449)  Pulse: 74  Temp: 98.8 °F (37.1 °C)      General appearance:  Alert, well-appearing, and in no distress.  Oriented to person, place, and time   Neurological: Normal speech, no focal findings noted; CN II - XII grossly intact           Musculoskeletal: Digits/nail without cyanosis/clubbing.  Normal muscle strength/tone.                 Neck: Supple, no significant adenopathy; thyroid is not enlarged                                 Chest:  Clear to auscultation, no wheezes, rales or rhonchi, symmetric air entry     No use of accessory muscles             Cardiac: Normal rate and regular rhythm, S1 and S2 normal; PMI non-displaced          Abdomen: Soft, nontender, nondistended, no masses or organomegaly     No rebound tenderness noted; bowel sounds normal     Pulsatile aortic mass is not palpable.     No groin adenopathy      Extremities:   2+ femoral pulses bilaterally     2+ Popliteal pulses; 2+ pedal pulses palpable.     No pedal edema     No ulcerations  Varicosities throughout legs.     LAB RESULTS:  Lab Results   Component Value Date    K 4.2 2019    K 4.0 2018    K 4.3 2017    CREATININE 0.7 2019    CREATININE 0.7 2018    CREATININE 0.7 2017     Lab Results   Component Value Date    WBC 6.38 2020    WBC 5.94 2019    WBC 8.05 2019    HCT 41.3 2020    HCT 41.4 2019    HCT 41.1 2019     2020     2019     2019     Lab Results   Component Value Date    HGBA1C 5.2 2019     IMAGING:  US veins lower extremity-- no DVT. GSV reflux bilateral including SFJ.        IMP/PLAN:  60 y.o. female with bilateral lower extremity venous insufficiency, left worse than right. Will try medical therapy first before considering vascular surgery intervention.  Duplex reviewed.     -elevate legs as much as possible  -thigh high compression stockings  -return to clinic 3 months to re-evaluate.     Varun Villalta MD  Vascular & Endovascular Surgery

## 2020-08-17 ENCOUNTER — TELEPHONE (OUTPATIENT)
Dept: NEUROLOGY | Facility: CLINIC | Age: 60
End: 2020-08-17

## 2020-10-07 ENCOUNTER — PATIENT MESSAGE (OUTPATIENT)
Dept: ADMINISTRATIVE | Facility: HOSPITAL | Age: 60
End: 2020-10-07

## 2020-10-22 ENCOUNTER — TELEPHONE (OUTPATIENT)
Dept: ADMINISTRATIVE | Facility: HOSPITAL | Age: 60
End: 2020-10-22

## 2021-01-04 ENCOUNTER — PATIENT MESSAGE (OUTPATIENT)
Dept: ADMINISTRATIVE | Facility: HOSPITAL | Age: 61
End: 2021-01-04

## 2021-04-05 ENCOUNTER — PATIENT MESSAGE (OUTPATIENT)
Dept: ADMINISTRATIVE | Facility: HOSPITAL | Age: 61
End: 2021-04-05

## 2021-04-14 ENCOUNTER — PATIENT OUTREACH (OUTPATIENT)
Dept: ADMINISTRATIVE | Facility: HOSPITAL | Age: 61
End: 2021-04-14

## 2021-04-14 ENCOUNTER — PATIENT MESSAGE (OUTPATIENT)
Dept: ADMINISTRATIVE | Facility: HOSPITAL | Age: 61
End: 2021-04-14

## 2021-06-29 ENCOUNTER — PATIENT OUTREACH (OUTPATIENT)
Dept: ADMINISTRATIVE | Facility: HOSPITAL | Age: 61
End: 2021-06-29

## 2021-06-29 DIAGNOSIS — Z12.4 CERVICAL CANCER SCREENING: Primary | ICD-10-CM

## 2021-07-22 ENCOUNTER — PATIENT OUTREACH (OUTPATIENT)
Dept: ADMINISTRATIVE | Facility: HOSPITAL | Age: 61
End: 2021-07-22

## 2021-08-11 ENCOUNTER — PATIENT OUTREACH (OUTPATIENT)
Dept: ADMINISTRATIVE | Facility: HOSPITAL | Age: 61
End: 2021-08-11

## 2021-08-13 ENCOUNTER — PATIENT OUTREACH (OUTPATIENT)
Dept: ADMINISTRATIVE | Facility: HOSPITAL | Age: 61
End: 2021-08-13

## 2021-08-25 ENCOUNTER — OFFICE VISIT (OUTPATIENT)
Dept: INTERNAL MEDICINE | Facility: CLINIC | Age: 61
End: 2021-08-25
Attending: INTERNAL MEDICINE
Payer: COMMERCIAL

## 2021-08-25 VITALS
DIASTOLIC BLOOD PRESSURE: 85 MMHG | BODY MASS INDEX: 36.44 KG/M2 | OXYGEN SATURATION: 97 % | SYSTOLIC BLOOD PRESSURE: 138 MMHG | HEIGHT: 62 IN | HEART RATE: 84 BPM | WEIGHT: 198 LBS

## 2021-08-25 DIAGNOSIS — E66.9 OBESITY (BMI 30-39.9): ICD-10-CM

## 2021-08-25 DIAGNOSIS — E55.9 VITAMIN D DEFICIENCY: ICD-10-CM

## 2021-08-25 DIAGNOSIS — R10.30 LOWER ABDOMINAL PAIN: ICD-10-CM

## 2021-08-25 DIAGNOSIS — E53.8 B12 DEFICIENCY: ICD-10-CM

## 2021-08-25 DIAGNOSIS — R25.1 TREMOR: ICD-10-CM

## 2021-08-25 DIAGNOSIS — D50.9 IRON DEFICIENCY ANEMIA, UNSPECIFIED IRON DEFICIENCY ANEMIA TYPE: ICD-10-CM

## 2021-08-25 DIAGNOSIS — Z00.00 ANNUAL PHYSICAL EXAM: Primary | ICD-10-CM

## 2021-08-25 DIAGNOSIS — Z98.890 HISTORY OF GASTRIC SURGERY: ICD-10-CM

## 2021-08-25 DIAGNOSIS — I83.93 VARICOSE VEINS OF BOTH LOWER EXTREMITIES, UNSPECIFIED WHETHER COMPLICATED: ICD-10-CM

## 2021-08-25 DIAGNOSIS — R04.2 HEMOPTYSIS: ICD-10-CM

## 2021-08-25 LAB
BILIRUB SERPL-MCNC: NEGATIVE MG/DL
BILIRUB UR QL STRIP: NEGATIVE
BLOOD URINE, POC: NEGATIVE
CLARITY UR REFRACT.AUTO: CLEAR
COLOR UR AUTO: YELLOW
COLOR, POC UA: NORMAL
GLUCOSE UR QL STRIP: NEGATIVE
GLUCOSE UR QL STRIP: NORMAL
HGB UR QL STRIP: NEGATIVE
KETONES UR QL STRIP: NEGATIVE
KETONES UR QL STRIP: NEGATIVE
LEUKOCYTE ESTERASE UR QL STRIP: NEGATIVE
LEUKOCYTE ESTERASE URINE, POC: NEGATIVE
NITRITE UR QL STRIP: NEGATIVE
NITRITE, POC UA: NEGATIVE
PH UR STRIP: 6 [PH] (ref 5–8)
PH, POC UA: 6
PROT UR QL STRIP: NEGATIVE
PROTEIN, POC: NEGATIVE
SP GR UR STRIP: 1 (ref 1–1.03)
SPECIFIC GRAVITY, POC UA: 1.01
URN SPEC COLLECT METH UR: NORMAL
UROBILINOGEN, POC UA: NORMAL

## 2021-08-25 PROCEDURE — 81003 URINALYSIS AUTO W/O SCOPE: CPT | Performed by: INTERNAL MEDICINE

## 2021-08-25 PROCEDURE — 1159F MED LIST DOCD IN RCRD: CPT | Mod: CPTII,S$GLB,, | Performed by: INTERNAL MEDICINE

## 2021-08-25 PROCEDURE — 99396 PREV VISIT EST AGE 40-64: CPT | Mod: S$GLB,,, | Performed by: INTERNAL MEDICINE

## 2021-08-25 PROCEDURE — 1126F AMNT PAIN NOTED NONE PRSNT: CPT | Mod: CPTII,S$GLB,, | Performed by: INTERNAL MEDICINE

## 2021-08-25 PROCEDURE — 3075F SYST BP GE 130 - 139MM HG: CPT | Mod: CPTII,S$GLB,, | Performed by: INTERNAL MEDICINE

## 2021-08-25 PROCEDURE — 1159F PR MEDICATION LIST DOCUMENTED IN MEDICAL RECORD: ICD-10-PCS | Mod: CPTII,S$GLB,, | Performed by: INTERNAL MEDICINE

## 2021-08-25 PROCEDURE — 3075F PR MOST RECENT SYSTOLIC BLOOD PRESS GE 130-139MM HG: ICD-10-PCS | Mod: CPTII,S$GLB,, | Performed by: INTERNAL MEDICINE

## 2021-08-25 PROCEDURE — 3079F PR MOST RECENT DIASTOLIC BLOOD PRESSURE 80-89 MM HG: ICD-10-PCS | Mod: CPTII,S$GLB,, | Performed by: INTERNAL MEDICINE

## 2021-08-25 PROCEDURE — 1160F PR REVIEW ALL MEDS BY PRESCRIBER/CLIN PHARMACIST DOCUMENTED: ICD-10-PCS | Mod: CPTII,S$GLB,, | Performed by: INTERNAL MEDICINE

## 2021-08-25 PROCEDURE — 1160F RVW MEDS BY RX/DR IN RCRD: CPT | Mod: CPTII,S$GLB,, | Performed by: INTERNAL MEDICINE

## 2021-08-25 PROCEDURE — 3079F DIAST BP 80-89 MM HG: CPT | Mod: CPTII,S$GLB,, | Performed by: INTERNAL MEDICINE

## 2021-08-25 PROCEDURE — 99396 PR PREVENTIVE VISIT,EST,40-64: ICD-10-PCS | Mod: S$GLB,,, | Performed by: INTERNAL MEDICINE

## 2021-08-25 PROCEDURE — 1126F PR PAIN SEVERITY QUANTIFIED, NO PAIN PRESENT: ICD-10-PCS | Mod: CPTII,S$GLB,, | Performed by: INTERNAL MEDICINE

## 2021-08-25 PROCEDURE — 99999 PR PBB SHADOW E&M-EST. PATIENT-LVL IV: CPT | Mod: PBBFAC,,, | Performed by: INTERNAL MEDICINE

## 2021-08-25 PROCEDURE — 81001 POCT URINALYSIS, DIPSTICK OR TABLET REAGENT, AUTOMATED, WITH MICROSCOP: ICD-10-PCS | Mod: S$GLB,,, | Performed by: INTERNAL MEDICINE

## 2021-08-25 PROCEDURE — 99999 PR PBB SHADOW E&M-EST. PATIENT-LVL IV: ICD-10-PCS | Mod: PBBFAC,,, | Performed by: INTERNAL MEDICINE

## 2021-08-25 PROCEDURE — 3008F BODY MASS INDEX DOCD: CPT | Mod: CPTII,S$GLB,, | Performed by: INTERNAL MEDICINE

## 2021-08-25 PROCEDURE — 3008F PR BODY MASS INDEX (BMI) DOCUMENTED: ICD-10-PCS | Mod: CPTII,S$GLB,, | Performed by: INTERNAL MEDICINE

## 2021-08-25 PROCEDURE — 81001 URINALYSIS AUTO W/SCOPE: CPT | Mod: S$GLB,,, | Performed by: INTERNAL MEDICINE

## 2021-08-26 ENCOUNTER — LAB VISIT (OUTPATIENT)
Dept: LAB | Facility: OTHER | Age: 61
End: 2021-08-26
Attending: INTERNAL MEDICINE
Payer: COMMERCIAL

## 2021-08-26 DIAGNOSIS — Z98.890 HISTORY OF GASTRIC SURGERY: ICD-10-CM

## 2021-08-26 DIAGNOSIS — D50.9 IRON DEFICIENCY ANEMIA, UNSPECIFIED IRON DEFICIENCY ANEMIA TYPE: ICD-10-CM

## 2021-08-26 DIAGNOSIS — R04.2 HEMOPTYSIS: ICD-10-CM

## 2021-08-26 DIAGNOSIS — Z00.00 ANNUAL PHYSICAL EXAM: ICD-10-CM

## 2021-08-26 LAB
25(OH)D3+25(OH)D2 SERPL-MCNC: 28 NG/ML (ref 30–96)
ALBUMIN SERPL BCP-MCNC: 3.8 G/DL (ref 3.5–5.2)
ALP SERPL-CCNC: 67 U/L (ref 55–135)
ALT SERPL W/O P-5'-P-CCNC: 27 U/L (ref 10–44)
ANION GAP SERPL CALC-SCNC: 9 MMOL/L (ref 8–16)
AST SERPL-CCNC: 25 U/L (ref 10–40)
BASOPHILS # BLD AUTO: 0.05 K/UL (ref 0–0.2)
BASOPHILS NFR BLD: 0.7 % (ref 0–1.9)
BILIRUB SERPL-MCNC: 0.5 MG/DL (ref 0.1–1)
BUN SERPL-MCNC: 8 MG/DL (ref 8–23)
CALCIUM SERPL-MCNC: 8.9 MG/DL (ref 8.7–10.5)
CHLORIDE SERPL-SCNC: 105 MMOL/L (ref 95–110)
CHOLEST SERPL-MCNC: 201 MG/DL (ref 120–199)
CHOLEST/HDLC SERPL: 3.5 {RATIO} (ref 2–5)
CO2 SERPL-SCNC: 25 MMOL/L (ref 23–29)
CREAT SERPL-MCNC: 0.7 MG/DL (ref 0.5–1.4)
DIFFERENTIAL METHOD: ABNORMAL
EOSINOPHIL # BLD AUTO: 0.1 K/UL (ref 0–0.5)
EOSINOPHIL NFR BLD: 1.3 % (ref 0–8)
ERYTHROCYTE [DISTWIDTH] IN BLOOD BY AUTOMATED COUNT: 12.7 % (ref 11.5–14.5)
EST. GFR  (AFRICAN AMERICAN): >60 ML/MIN/1.73 M^2
EST. GFR  (NON AFRICAN AMERICAN): >60 ML/MIN/1.73 M^2
FERRITIN SERPL-MCNC: 56 NG/ML (ref 20–300)
GLUCOSE SERPL-MCNC: 103 MG/DL (ref 70–110)
HCT VFR BLD AUTO: 43.2 % (ref 37–48.5)
HDLC SERPL-MCNC: 58 MG/DL (ref 40–75)
HDLC SERPL: 28.9 % (ref 20–50)
HGB BLD-MCNC: 14.2 G/DL (ref 12–16)
IMM GRANULOCYTES # BLD AUTO: 0.05 K/UL (ref 0–0.04)
IMM GRANULOCYTES NFR BLD AUTO: 0.7 % (ref 0–0.5)
IRON SERPL-MCNC: 73 UG/DL (ref 30–160)
LDLC SERPL CALC-MCNC: 118.6 MG/DL (ref 63–159)
LYMPHOCYTES # BLD AUTO: 2.5 K/UL (ref 1–4.8)
LYMPHOCYTES NFR BLD: 36.6 % (ref 18–48)
MCH RBC QN AUTO: 29 PG (ref 27–31)
MCHC RBC AUTO-ENTMCNC: 32.9 G/DL (ref 32–36)
MCV RBC AUTO: 88 FL (ref 82–98)
MONOCYTES # BLD AUTO: 0.5 K/UL (ref 0.3–1)
MONOCYTES NFR BLD: 6.7 % (ref 4–15)
NEUTROPHILS # BLD AUTO: 3.6 K/UL (ref 1.8–7.7)
NEUTROPHILS NFR BLD: 54 % (ref 38–73)
NONHDLC SERPL-MCNC: 143 MG/DL
NRBC BLD-RTO: 0 /100 WBC
PLATELET # BLD AUTO: 231 K/UL (ref 150–450)
PMV BLD AUTO: 10.3 FL (ref 9.2–12.9)
POTASSIUM SERPL-SCNC: 4.3 MMOL/L (ref 3.5–5.1)
PROT SERPL-MCNC: 6.7 G/DL (ref 6–8.4)
RBC # BLD AUTO: 4.89 M/UL (ref 4–5.4)
SATURATED IRON: 19 % (ref 20–50)
SODIUM SERPL-SCNC: 139 MMOL/L (ref 136–145)
TOTAL IRON BINDING CAPACITY: 386 UG/DL (ref 250–450)
TRANSFERRIN SERPL-MCNC: 261 MG/DL (ref 200–375)
TRIGL SERPL-MCNC: 122 MG/DL (ref 30–150)
TSH SERPL DL<=0.005 MIU/L-ACNC: 2.27 UIU/ML (ref 0.4–4)
VIT B12 SERPL-MCNC: 847 PG/ML (ref 210–950)
WBC # BLD AUTO: 6.69 K/UL (ref 3.9–12.7)

## 2021-08-26 PROCEDURE — 84466 ASSAY OF TRANSFERRIN: CPT | Performed by: INTERNAL MEDICINE

## 2021-08-26 PROCEDURE — 82306 VITAMIN D 25 HYDROXY: CPT | Performed by: INTERNAL MEDICINE

## 2021-08-26 PROCEDURE — 80053 COMPREHEN METABOLIC PANEL: CPT | Performed by: INTERNAL MEDICINE

## 2021-08-26 PROCEDURE — 36415 COLL VENOUS BLD VENIPUNCTURE: CPT | Performed by: INTERNAL MEDICINE

## 2021-08-26 PROCEDURE — 84443 ASSAY THYROID STIM HORMONE: CPT | Performed by: INTERNAL MEDICINE

## 2021-08-26 PROCEDURE — 85025 COMPLETE CBC W/AUTO DIFF WBC: CPT | Performed by: INTERNAL MEDICINE

## 2021-08-26 PROCEDURE — 82728 ASSAY OF FERRITIN: CPT | Performed by: INTERNAL MEDICINE

## 2021-08-26 PROCEDURE — 80061 LIPID PANEL: CPT | Performed by: INTERNAL MEDICINE

## 2021-08-26 PROCEDURE — 82607 VITAMIN B-12: CPT | Performed by: INTERNAL MEDICINE

## 2021-09-09 DIAGNOSIS — R73.01 IFG (IMPAIRED FASTING GLUCOSE): ICD-10-CM

## 2021-09-09 DIAGNOSIS — D50.9 IRON DEFICIENCY ANEMIA, UNSPECIFIED IRON DEFICIENCY ANEMIA TYPE: Primary | ICD-10-CM

## 2021-09-09 DIAGNOSIS — E55.9 VITAMIN D DEFICIENCY: ICD-10-CM

## 2021-09-13 ENCOUNTER — TELEPHONE (OUTPATIENT)
Dept: INTERNAL MEDICINE | Facility: CLINIC | Age: 61
End: 2021-09-13
Payer: COMMERCIAL

## 2021-09-13 NOTE — TELEPHONE ENCOUNTER
----- Message from Neena Barrera MD sent at 9/9/2021  4:38 PM CDT -----  Message sent to pt via my chart with results and updates to plan.   Pleas schedule a1c in 1-2 weeks   Please schedule cbc, ferritin, tibc and vit d in 6 months

## 2021-09-30 ENCOUNTER — TELEPHONE (OUTPATIENT)
Dept: NEUROLOGY | Facility: CLINIC | Age: 61
End: 2021-09-30

## 2021-10-07 ENCOUNTER — LAB VISIT (OUTPATIENT)
Dept: LAB | Facility: HOSPITAL | Age: 61
End: 2021-10-07
Payer: COMMERCIAL

## 2021-10-07 ENCOUNTER — OFFICE VISIT (OUTPATIENT)
Dept: NEUROLOGY | Facility: CLINIC | Age: 61
End: 2021-10-07
Payer: COMMERCIAL

## 2021-10-07 VITALS
SYSTOLIC BLOOD PRESSURE: 158 MMHG | HEIGHT: 62 IN | HEART RATE: 76 BPM | BODY MASS INDEX: 36.44 KG/M2 | DIASTOLIC BLOOD PRESSURE: 99 MMHG | WEIGHT: 198 LBS

## 2021-10-07 DIAGNOSIS — Z71.89 COUNSELING REGARDING GOALS OF CARE: ICD-10-CM

## 2021-10-07 DIAGNOSIS — R41.89 COGNITIVE CHANGES: ICD-10-CM

## 2021-10-07 DIAGNOSIS — R25.1 TREMOR: ICD-10-CM

## 2021-10-07 DIAGNOSIS — R29.2: ICD-10-CM

## 2021-10-07 DIAGNOSIS — R25.1 TREMOR: Primary | ICD-10-CM

## 2021-10-07 LAB
FOLATE SERPL-MCNC: 14.8 NG/ML (ref 4–24)
RPR SER QL: NORMAL

## 2021-10-07 PROCEDURE — 1159F PR MEDICATION LIST DOCUMENTED IN MEDICAL RECORD: ICD-10-PCS | Mod: CPTII,S$GLB,, | Performed by: PHYSICIAN ASSISTANT

## 2021-10-07 PROCEDURE — 3077F SYST BP >= 140 MM HG: CPT | Mod: CPTII,S$GLB,, | Performed by: PHYSICIAN ASSISTANT

## 2021-10-07 PROCEDURE — 3077F PR MOST RECENT SYSTOLIC BLOOD PRESSURE >= 140 MM HG: ICD-10-PCS | Mod: CPTII,S$GLB,, | Performed by: PHYSICIAN ASSISTANT

## 2021-10-07 PROCEDURE — 82746 ASSAY OF FOLIC ACID SERUM: CPT | Performed by: PHYSICIAN ASSISTANT

## 2021-10-07 PROCEDURE — 3080F DIAST BP >= 90 MM HG: CPT | Mod: CPTII,S$GLB,, | Performed by: PHYSICIAN ASSISTANT

## 2021-10-07 PROCEDURE — 1159F MED LIST DOCD IN RCRD: CPT | Mod: CPTII,S$GLB,, | Performed by: PHYSICIAN ASSISTANT

## 2021-10-07 PROCEDURE — 3080F PR MOST RECENT DIASTOLIC BLOOD PRESSURE >= 90 MM HG: ICD-10-PCS | Mod: CPTII,S$GLB,, | Performed by: PHYSICIAN ASSISTANT

## 2021-10-07 PROCEDURE — 99999 PR PBB SHADOW E&M-EST. PATIENT-LVL III: ICD-10-PCS | Mod: PBBFAC,,, | Performed by: PHYSICIAN ASSISTANT

## 2021-10-07 PROCEDURE — 84425 ASSAY OF VITAMIN B-1: CPT | Performed by: PHYSICIAN ASSISTANT

## 2021-10-07 PROCEDURE — 86592 SYPHILIS TEST NON-TREP QUAL: CPT | Performed by: PHYSICIAN ASSISTANT

## 2021-10-07 PROCEDURE — 3008F PR BODY MASS INDEX (BMI) DOCUMENTED: ICD-10-PCS | Mod: CPTII,S$GLB,, | Performed by: PHYSICIAN ASSISTANT

## 2021-10-07 PROCEDURE — 99999 PR PBB SHADOW E&M-EST. PATIENT-LVL III: CPT | Mod: PBBFAC,,, | Performed by: PHYSICIAN ASSISTANT

## 2021-10-07 PROCEDURE — 1160F PR REVIEW ALL MEDS BY PRESCRIBER/CLIN PHARMACIST DOCUMENTED: ICD-10-PCS | Mod: CPTII,S$GLB,, | Performed by: PHYSICIAN ASSISTANT

## 2021-10-07 PROCEDURE — 99205 OFFICE O/P NEW HI 60 MIN: CPT | Mod: S$GLB,,, | Performed by: PHYSICIAN ASSISTANT

## 2021-10-07 PROCEDURE — 99205 PR OFFICE/OUTPT VISIT, NEW, LEVL V, 60-74 MIN: ICD-10-PCS | Mod: S$GLB,,, | Performed by: PHYSICIAN ASSISTANT

## 2021-10-07 PROCEDURE — 1160F RVW MEDS BY RX/DR IN RCRD: CPT | Mod: CPTII,S$GLB,, | Performed by: PHYSICIAN ASSISTANT

## 2021-10-07 PROCEDURE — 3008F BODY MASS INDEX DOCD: CPT | Mod: CPTII,S$GLB,, | Performed by: PHYSICIAN ASSISTANT

## 2021-10-07 PROCEDURE — 36415 COLL VENOUS BLD VENIPUNCTURE: CPT | Performed by: PHYSICIAN ASSISTANT

## 2021-10-07 RX ORDER — PROPRANOLOL HYDROCHLORIDE 10 MG/1
10 TABLET ORAL 3 TIMES DAILY
Qty: 90 TABLET | Refills: 11 | Status: SHIPPED | OUTPATIENT
Start: 2021-10-07 | End: 2021-12-20

## 2021-10-12 LAB — VIT B1 BLD-MCNC: 84 UG/L (ref 38–122)

## 2021-10-14 ENCOUNTER — TELEPHONE (OUTPATIENT)
Dept: NEUROLOGY | Facility: CLINIC | Age: 61
End: 2021-10-14

## 2021-10-14 DIAGNOSIS — R25.1 TREMOR: Primary | ICD-10-CM

## 2021-10-14 DIAGNOSIS — R29.2: ICD-10-CM

## 2021-10-14 DIAGNOSIS — R29.2 BRISK DEEP TENDON REFLEXES: ICD-10-CM

## 2021-10-18 ENCOUNTER — TELEPHONE (OUTPATIENT)
Dept: NEUROLOGY | Facility: CLINIC | Age: 61
End: 2021-10-18

## 2021-12-20 ENCOUNTER — OFFICE VISIT (OUTPATIENT)
Dept: NEUROLOGY | Facility: CLINIC | Age: 61
End: 2021-12-20
Payer: COMMERCIAL

## 2021-12-20 VITALS
DIASTOLIC BLOOD PRESSURE: 85 MMHG | WEIGHT: 198 LBS | HEART RATE: 85 BPM | HEIGHT: 62 IN | SYSTOLIC BLOOD PRESSURE: 126 MMHG | BODY MASS INDEX: 36.44 KG/M2

## 2021-12-20 DIAGNOSIS — R25.1 TREMOR: Primary | ICD-10-CM

## 2021-12-20 DIAGNOSIS — R29.2: ICD-10-CM

## 2021-12-20 PROCEDURE — 99214 OFFICE O/P EST MOD 30 MIN: CPT | Mod: S$GLB,,, | Performed by: PHYSICIAN ASSISTANT

## 2021-12-20 PROCEDURE — 99214 PR OFFICE/OUTPT VISIT, EST, LEVL IV, 30-39 MIN: ICD-10-PCS | Mod: S$GLB,,, | Performed by: PHYSICIAN ASSISTANT

## 2021-12-20 PROCEDURE — 99999 PR PBB SHADOW E&M-EST. PATIENT-LVL III: ICD-10-PCS | Mod: PBBFAC,,, | Performed by: PHYSICIAN ASSISTANT

## 2021-12-20 PROCEDURE — 99999 PR PBB SHADOW E&M-EST. PATIENT-LVL III: CPT | Mod: PBBFAC,,, | Performed by: PHYSICIAN ASSISTANT

## 2021-12-20 RX ORDER — PROPRANOLOL HYDROCHLORIDE 20 MG/1
20 TABLET ORAL 3 TIMES DAILY
Qty: 90 TABLET | Refills: 11 | Status: SHIPPED | OUTPATIENT
Start: 2021-12-20 | End: 2022-09-02

## 2022-01-11 ENCOUNTER — TELEPHONE (OUTPATIENT)
Dept: INTERNAL MEDICINE | Facility: CLINIC | Age: 62
End: 2022-01-11
Payer: COMMERCIAL

## 2022-01-11 DIAGNOSIS — R05.9 COUGH: Primary | ICD-10-CM

## 2022-01-11 NOTE — TELEPHONE ENCOUNTER
----- Message from Yuly Jeffries sent at 1/11/2022  1:06 PM CST -----  Name of Who is Calling: DIGNA DIAZ         What is the request in detail: The patient is calling to speak to the nurse in regards to the patient  being sick and would like to request medication. Please advise          Can the clinic reply by [facility]: No         [facility] [phone]

## 2022-01-12 NOTE — TELEPHONE ENCOUNTER
Discussed iwht  today at his appt that wife is symptomatic - cough   - rec covid test and offer VV if she would like

## 2022-01-13 ENCOUNTER — OFFICE VISIT (OUTPATIENT)
Dept: INTERNAL MEDICINE | Facility: CLINIC | Age: 62
End: 2022-01-13
Attending: INTERNAL MEDICINE
Payer: COMMERCIAL

## 2022-01-13 VITALS — HEIGHT: 62 IN | WEIGHT: 197 LBS | BODY MASS INDEX: 36.25 KG/M2

## 2022-01-13 DIAGNOSIS — R05.9 COUGH: ICD-10-CM

## 2022-01-13 DIAGNOSIS — R06.2 WHEEZING: Primary | ICD-10-CM

## 2022-01-13 PROCEDURE — 99214 OFFICE O/P EST MOD 30 MIN: CPT | Mod: 95,,, | Performed by: INTERNAL MEDICINE

## 2022-01-13 PROCEDURE — 1159F PR MEDICATION LIST DOCUMENTED IN MEDICAL RECORD: ICD-10-PCS | Mod: CPTII,95,, | Performed by: INTERNAL MEDICINE

## 2022-01-13 PROCEDURE — 3008F PR BODY MASS INDEX (BMI) DOCUMENTED: ICD-10-PCS | Mod: CPTII,95,, | Performed by: INTERNAL MEDICINE

## 2022-01-13 PROCEDURE — 3008F BODY MASS INDEX DOCD: CPT | Mod: CPTII,95,, | Performed by: INTERNAL MEDICINE

## 2022-01-13 PROCEDURE — 99214 PR OFFICE/OUTPT VISIT, EST, LEVL IV, 30-39 MIN: ICD-10-PCS | Mod: 95,,, | Performed by: INTERNAL MEDICINE

## 2022-01-13 PROCEDURE — 1160F PR REVIEW ALL MEDS BY PRESCRIBER/CLIN PHARMACIST DOCUMENTED: ICD-10-PCS | Mod: CPTII,95,, | Performed by: INTERNAL MEDICINE

## 2022-01-13 PROCEDURE — 1160F RVW MEDS BY RX/DR IN RCRD: CPT | Mod: CPTII,95,, | Performed by: INTERNAL MEDICINE

## 2022-01-13 PROCEDURE — 1159F MED LIST DOCD IN RCRD: CPT | Mod: CPTII,95,, | Performed by: INTERNAL MEDICINE

## 2022-01-13 RX ORDER — ALBUTEROL SULFATE 90 UG/1
2 AEROSOL, METERED RESPIRATORY (INHALATION) EVERY 6 HOURS PRN
Qty: 18 G | Refills: 2 | Status: SHIPPED | OUTPATIENT
Start: 2022-01-13 | End: 2022-08-09

## 2022-01-13 RX ORDER — PREDNISONE 20 MG/1
40 TABLET ORAL DAILY
Qty: 10 TABLET | Refills: 0 | Status: SHIPPED | OUTPATIENT
Start: 2022-01-13 | End: 2022-01-18

## 2022-01-13 NOTE — TELEPHONE ENCOUNTER
Called pt and schedule an audio appt at 9:00 am today as pt has difficulty with [employer]

## 2022-01-13 NOTE — PROGRESS NOTES
Subjective:   Patient ID: Trupti Coronado is a 61 y.o. female  Chief complaint: No chief complaint on file.      HPI  Audio Only Telehealth Visit     The patient location is: [place]  The chief complaint leading to consultation is: cough  Visit type: Virtual visit with audio only (telephone)  Total time spent with patient: 16 min      The reason for the audio only service rather than synchronous audio and video virtual visit was related to technical difficulties or patient preference/necessity.     Each patient to whom I provide medical services by telemedicine is:  (1) informed of the relationship between the physician and patient and the respective role of any other health care provider with respect to management of the patient; and (2) notified that they may decline to receive medical services by telemedicine and may withdraw from such care at any time. Patient verbally consented to receive this service via voice-only telephone call.       HPI:   Pt here for UC appt   Reports sinus congestion and productive cough   Started last Thursday   Worst days were  and Monday   Better today   Reports wheezing   Productive cough  Took home covid test and negative    also took home test and negative   Has nyquil and dayquil and using this - helpful for sx    She was given a 7 day course of cipro from a friend and started taking this on her own   - Today feels like better  Last subj fever was over the weekend and none since then    Assessment and plan:    Cough, wheezin. Will give pdn and albuterol   2. Improved so continue current course of med and complete - counseled pt on er and rtc prompts   3. Declined covid PCR testing as better at day 7 - order in if changes mind - discussed check to determine if covid so can let close contact know    All questions were answered and pt verbalized understanding of plan.   Plan:  Diagnoses and all orders for this visit:    Wheezing  -     albuterol (VENTOLIN  HFA) 90 mcg/actuation inhaler; Inhale 2 puffs into the lungs every 6 (six) hours as needed for Wheezing. Rescue  -     predniSONE (DELTASONE) 20 MG tablet; Take 2 tablets (40 mg total) by mouth once daily. for 5 days    Cough

## 2022-05-31 ENCOUNTER — PATIENT MESSAGE (OUTPATIENT)
Dept: INTERNAL MEDICINE | Facility: CLINIC | Age: 62
End: 2022-05-31
Payer: COMMERCIAL

## 2022-05-31 ENCOUNTER — TELEPHONE (OUTPATIENT)
Dept: INTERNAL MEDICINE | Facility: CLINIC | Age: 62
End: 2022-05-31
Payer: COMMERCIAL

## 2022-05-31 NOTE — TELEPHONE ENCOUNTER
Patient states that she has been having gastro concerns and was informed that Dr. Barrera next appointment is not until 8/9/2022. Patient was offered a sooner appointment with another provider in office for tomorrow but declined. Patient states that she only wants to see PCP. Patient was added to wait list for sooner appointment. Thanks.

## 2022-05-31 NOTE — TELEPHONE ENCOUNTER
----- Message from Kalpana Kirby sent at 5/31/2022 12:32 PM CDT -----  Regarding: Sooner Appt  Contact: Patient  Type:  Sooner Apoointment Request    Caller is requesting a sooner appointment.  Caller declined first available appointment listed below.  Caller will not accept being placed on the waitlist and is requesting a message be sent to doctor.  Name of Caller: Patient   When is the first available appointment? 08/09/2022   Symptoms: stomach pain   Would the patient rather a call back or a response via [facility]? Call back   Best Call Back Number:[phone]  Additional Information: pt would like to be seen as soon as possible

## 2022-05-31 NOTE — TELEPHONE ENCOUNTER
----- Message from Dinah Ramos sent at 5/31/2022  1:55 PM CDT -----  Who Called: DIGNA DIAZ    Who Left Message for Patient: Debbie Garrido    Does the patient know what this is regarding?: Yes    Best Call Back Number: [phone]    Additional Information:

## 2022-06-01 RX ORDER — PANTOPRAZOLE SODIUM 40 MG/1
40 TABLET, DELAYED RELEASE ORAL DAILY
Qty: 90 TABLET | Refills: 0 | Status: SHIPPED | OUTPATIENT
Start: 2022-06-01 | End: 2024-02-27

## 2022-06-01 NOTE — TELEPHONE ENCOUNTER
No new care gaps identified.  [facility] Embedded Care Gaps. Reference number: [id number]. 6/01/2022   10:48:15 AM CDT

## 2022-08-09 ENCOUNTER — OFFICE VISIT (OUTPATIENT)
Dept: INTERNAL MEDICINE | Facility: CLINIC | Age: 62
End: 2022-08-09
Attending: INTERNAL MEDICINE
Payer: COMMERCIAL

## 2022-08-09 ENCOUNTER — HOSPITAL ENCOUNTER (OUTPATIENT)
Dept: RADIOLOGY | Facility: OTHER | Age: 62
Discharge: HOME | End: 2022-08-09
Attending: INTERNAL MEDICINE
Payer: COMMERCIAL

## 2022-08-09 VITALS
HEIGHT: 62 IN | SYSTOLIC BLOOD PRESSURE: 142 MMHG | OXYGEN SATURATION: 98 % | HEART RATE: 99 BPM | DIASTOLIC BLOOD PRESSURE: 96 MMHG | BODY MASS INDEX: 35.62 KG/M2 | WEIGHT: 193.56 LBS

## 2022-08-09 DIAGNOSIS — R05.9 COUGH: ICD-10-CM

## 2022-08-09 DIAGNOSIS — Z98.84 HISTORY OF ROUX-EN-Y GASTRIC BYPASS: ICD-10-CM

## 2022-08-09 DIAGNOSIS — E53.8 B12 DEFICIENCY: ICD-10-CM

## 2022-08-09 DIAGNOSIS — G25.0 TREMOR, ESSENTIAL: ICD-10-CM

## 2022-08-09 DIAGNOSIS — E66.9 OBESITY (BMI 30-39.9): ICD-10-CM

## 2022-08-09 DIAGNOSIS — D50.9 IRON DEFICIENCY ANEMIA, UNSPECIFIED IRON DEFICIENCY ANEMIA TYPE: ICD-10-CM

## 2022-08-09 DIAGNOSIS — E55.9 VITAMIN D DEFICIENCY: ICD-10-CM

## 2022-08-09 DIAGNOSIS — Z00.00 ANNUAL PHYSICAL EXAM: Primary | ICD-10-CM

## 2022-08-09 DIAGNOSIS — R04.2 HEMOPTYSIS: ICD-10-CM

## 2022-08-09 DIAGNOSIS — E61.1 IRON DEFICIENCY: ICD-10-CM

## 2022-08-09 PROCEDURE — 99396 PR PREVENTIVE VISIT,EST,40-64: ICD-10-PCS | Mod: S$GLB,,, | Performed by: INTERNAL MEDICINE

## 2022-08-09 PROCEDURE — 3080F DIAST BP >= 90 MM HG: CPT | Mod: CPTII,S$GLB,, | Performed by: INTERNAL MEDICINE

## 2022-08-09 PROCEDURE — 3008F PR BODY MASS INDEX (BMI) DOCUMENTED: ICD-10-PCS | Mod: CPTII,S$GLB,, | Performed by: INTERNAL MEDICINE

## 2022-08-09 PROCEDURE — 3044F HG A1C LEVEL LT 7.0%: CPT | Mod: CPTII,S$GLB,, | Performed by: INTERNAL MEDICINE

## 2022-08-09 PROCEDURE — 99999 PR PBB SHADOW E&M-EST. PATIENT-LVL IV: ICD-10-PCS | Mod: PBBFAC,,, | Performed by: INTERNAL MEDICINE

## 2022-08-09 PROCEDURE — 71046 XR CHEST PA AND LATERAL: ICD-10-PCS | Mod: 26,,, | Performed by: RADIOLOGY

## 2022-08-09 PROCEDURE — 3008F BODY MASS INDEX DOCD: CPT | Mod: CPTII,S$GLB,, | Performed by: INTERNAL MEDICINE

## 2022-08-09 PROCEDURE — 3080F PR MOST RECENT DIASTOLIC BLOOD PRESSURE >= 90 MM HG: ICD-10-PCS | Mod: CPTII,S$GLB,, | Performed by: INTERNAL MEDICINE

## 2022-08-09 PROCEDURE — 99999 PR PBB SHADOW E&M-EST. PATIENT-LVL IV: CPT | Mod: PBBFAC,,, | Performed by: INTERNAL MEDICINE

## 2022-08-09 PROCEDURE — 99396 PREV VISIT EST AGE 40-64: CPT | Mod: S$GLB,,, | Performed by: INTERNAL MEDICINE

## 2022-08-09 PROCEDURE — 3044F PR MOST RECENT HEMOGLOBIN A1C LEVEL <7.0%: ICD-10-PCS | Mod: CPTII,S$GLB,, | Performed by: INTERNAL MEDICINE

## 2022-08-09 PROCEDURE — 71046 X-RAY EXAM CHEST 2 VIEWS: CPT | Mod: 26,,, | Performed by: RADIOLOGY

## 2022-08-09 PROCEDURE — 1160F PR REVIEW ALL MEDS BY PRESCRIBER/CLIN PHARMACIST DOCUMENTED: ICD-10-PCS | Mod: CPTII,S$GLB,, | Performed by: INTERNAL MEDICINE

## 2022-08-09 PROCEDURE — 1159F MED LIST DOCD IN RCRD: CPT | Mod: CPTII,S$GLB,, | Performed by: INTERNAL MEDICINE

## 2022-08-09 PROCEDURE — 71046 X-RAY EXAM CHEST 2 VIEWS: CPT | Mod: TC,FY

## 2022-08-09 PROCEDURE — 3077F PR MOST RECENT SYSTOLIC BLOOD PRESSURE >= 140 MM HG: ICD-10-PCS | Mod: CPTII,S$GLB,, | Performed by: INTERNAL MEDICINE

## 2022-08-09 PROCEDURE — 1160F RVW MEDS BY RX/DR IN RCRD: CPT | Mod: CPTII,S$GLB,, | Performed by: INTERNAL MEDICINE

## 2022-08-09 PROCEDURE — 1159F PR MEDICATION LIST DOCUMENTED IN MEDICAL RECORD: ICD-10-PCS | Mod: CPTII,S$GLB,, | Performed by: INTERNAL MEDICINE

## 2022-08-09 PROCEDURE — 3077F SYST BP >= 140 MM HG: CPT | Mod: CPTII,S$GLB,, | Performed by: INTERNAL MEDICINE

## 2022-08-09 RX ORDER — IBUPROFEN 200 MG
1 CAPSULE ORAL DAILY
COMMUNITY

## 2022-08-09 RX ORDER — CYANOCOBALAMIN 1000 UG/ML
1000 INJECTION, SOLUTION INTRAMUSCULAR; SUBCUTANEOUS
Qty: 1 ML | Refills: 11 | Status: SHIPPED | OUTPATIENT
Start: 2022-08-09 | End: 2022-09-17

## 2022-08-09 RX ORDER — SYRINGE, DISPOSABLE, 3 ML
1 SYRINGE, EMPTY DISPOSABLE MISCELLANEOUS
Qty: 25 EACH | Refills: 3 | Status: SHIPPED | OUTPATIENT
Start: 2022-08-09

## 2022-08-09 NOTE — PROGRESS NOTES
Subjective:   Patient ID: Trupti Coronado is a 62 y.o. female  Chief complaint:   Chief Complaint   Patient presents with    Annual Exam       HPI    Pt here for annual exam   Daughter moving to [place] for grad school - J[place]     62F with hx of gastric surgery, b12 def, CHARIS, vit d def, essential tremor:    Had 1/2 cup coffee with cream this am - has time to get labs checked    Tremor:   - Seen by neuro 12/20/2021     GERD:  Sx intermittent - only taking ppi a couple of times per month when needed   At v:   Ordered CT abd/pelvis last year after lcv but not completed for sx discussed at that time - never completed - sx improved   - Reports GI sx reported at [facility] improved - Covid pandemic interfered with those appt follow up     Had covid earlier this year and thinks that they had it in July - did not take covid test  - cough   - more sinus issues    - occ wheezing   - more sinus congestion - taking zyrtec 1 per week and post nasal drip   - has tried flonase in past   No fevers    Noticed blood in mucous intermittently   No known exposure to TB  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    B12 def:  Not on for > 1 year - did not get refills   Ran out of B12 inj and ran out - last was 10 months ago    Iron def:   Not taking oral iron other mvi     Vit d def:   Taking vit d - 2 otc daily - unsure of dosing      Varicose veins:  - seen by vascular in past   - stable  Prev:   - has periods when gets pressure and pain in left post knee - asymptomatic today   - had US neg for clot     Elevated bp:   Repeat elevated 142/89  - did not take propranolol this am for tremor    Essential tremor:    - nancy propranolol at this time   Prev:  - affects ability to write and bothersome   - seen by neuro previously - discussed follow up to review tx options     Getting dental implant     HM:  hiv screening   shingrix   dexa and mmg ordered through GYN in [place] in [facility] - to f/u in Dec 2022/Jan 2023     Review of  "Systems      Objective:  Vitals:    08/09/22 1041   BP: (!) 142/96   BP Location: Left arm   Patient Position: Sitting   Pulse: 99   SpO2: 98%   Weight: 87.8 kg (193 lb 9 oz)   Height: 5' 2" (1.575 m)     Body mass index is 35.4 kg/m².    Physical Exam  Vitals reviewed.   Constitutional:       Appearance: Normal appearance. She is well-developed.   HENT:      Head: Normocephalic and atraumatic.      Right Ear: Tympanic membrane, ear canal and external ear normal.      Left Ear: Tympanic membrane, ear canal and external ear normal.      Nose: Nose normal. No congestion.      Mouth/Throat:      Mouth: Mucous membranes are moist.      Pharynx: Oropharynx is clear. No oropharyngeal exudate.   Eyes:      Extraocular Movements: Extraocular movements intact.      Conjunctiva/sclera: Conjunctivae normal.   Neck:      Thyroid: No thyromegaly.   Cardiovascular:      Rate and Rhythm: Normal rate and regular rhythm.      Pulses: Normal pulses.      Heart sounds: Normal heart sounds.   Pulmonary:      Effort: Pulmonary effort is normal.      Breath sounds: Normal breath sounds.   Abdominal:      General: Bowel sounds are normal.      Palpations: Abdomen is soft.   Musculoskeletal:         General: No swelling or tenderness.      Cervical back: Neck supple.   Lymphadenopathy:      Cervical: No cervical adenopathy.   Skin:     General: Skin is warm and dry.      Capillary Refill: Capillary refill takes less than 2 seconds.   Neurological:      General: No focal deficit present.      Mental Status: She is alert and oriented to person, place, and time. Mental status is at baseline.   Psychiatric:         Behavior: Behavior normal.         Thought Content: Thought content normal.       Assessment:  1. Annual physical exam    2. Vitamin D deficiency    3. B12 deficiency    4. Iron deficiency anemia, unspecified iron deficiency anemia type    5. History of Tatyana-en-Y gastric bypass    6. Tremor, essential    7. Cough    8. Hemoptysis  "   9. Obesity (BMI 30-39.9)    10. Iron deficiency        Plan:    Annual physical exam  -     Comprehensive Metabolic Panel; Future; Expected date: 08/09/2022  -     CBC Auto Differential; Future; Expected date: 08/09/2022  -     FERRITIN; Future; Expected date: 08/09/2022  -     IRON AND TIBC; Future; Expected date: 08/09/2022  -     Lipid Panel; Future; Expected date: 08/09/2022  -     Vitamin D; Future; Expected date: 08/09/2022  -     TSH; Future; Expected date: 08/09/2022  -     Hemoglobin A1C; Future; Expected date: 08/09/2022  -     Vitamin B12; Future; Expected date: 08/09/2022  -     Vitamin B1; Future; Expected date: 08/09/2022  -     ZINC; Future; Expected date: 08/09/2022  Recommend daily sunscreen, cardiovascular exercise min 30 min 5 days per week. Seatbelts routinely.    Vitamin D deficiency  -     Vitamin D; Future; Expected date: 08/09/2022    B12 deficiency  -     Vitamin B12; Future; Expected date: 08/09/2022  -     cyanocobalamin 1,000 mcg/mL injection; Inject 1 mL (1,000 mcg total) into the skin every 30 days. Please give 1ml syrginges  Dispense: 1 mL; Refill: 11  -     syringe, disposable, 3 mL Syrg; 1 each by Misc.(Non-Drug; Combo Route) route every 30 days. 22 gauge  Dispense: 25 each; Refill: 3    Iron deficiency anemia, unspecified iron deficiency anemia type  -     FERRITIN; Future; Expected date: 08/09/2022  -     IRON AND TIBC; Future; Expected date: 08/09/2022    History of Tatyana-en-Y gastric bypass  -     FERRITIN; Future; Expected date: 08/09/2022  -     IRON AND TIBC; Future; Expected date: 08/09/2022  -     Hemoglobin A1C; Future; Expected date: 08/09/2022  -     Vitamin B12; Future; Expected date: 08/09/2022  -     Vitamin B1; Future; Expected date: 08/09/2022  -     ZINC; Future; Expected date: 08/09/2022    Tremor, essential    Cough  -     X-Ray Chest PA And Lateral; Future; Expected date: 08/09/2022    Hemoptysis  -     QUANTIFERON GOLD TB; Future; Expected date:  08/09/2022    Obesity (BMI 30-39.9)  - cont diet and exercise  - increase intensity and duration of CV exercise to continue weight loss  - goal wt loss one pound per week  - portion control, healthy choices    Iron deficiency    Recommend daily sunscreen, cardiovascular exercise min 30 min 5 days per week. Seatbelts routinely.     Resume supplements   Check approp labs   Check cxr   nv for bp check in 2-4 weeks - if still above goal will rec start meds     Health Maintenance   Topic Date Due    Mammogram  02/03/2023    Lipid Panel  08/09/2027    TETANUS VACCINE  09/18/2028    Hepatitis C Screening  Completed

## 2022-08-16 ENCOUNTER — TELEPHONE (OUTPATIENT)
Dept: INTERNAL MEDICINE | Facility: CLINIC | Age: 62
End: 2022-08-16

## 2022-08-16 DIAGNOSIS — R04.2 HEMOPTYSIS: Primary | ICD-10-CM

## 2022-08-16 NOTE — TELEPHONE ENCOUNTER
----- Message from Neena Barrera MD sent at 8/16/2022  2:41 AM CDT -----  Message sent to pt via my chart with results and updates to plan.     Please schedule ct chest in 1-2 weeks at her convenience

## 2022-09-02 ENCOUNTER — TELEPHONE (OUTPATIENT)
Dept: INTERNAL MEDICINE | Facility: CLINIC | Age: 62
End: 2022-09-02

## 2022-09-02 ENCOUNTER — CLINICAL SUPPORT (OUTPATIENT)
Dept: INTERNAL MEDICINE | Facility: CLINIC | Age: 62
End: 2022-09-02
Payer: COMMERCIAL

## 2022-09-02 VITALS — SYSTOLIC BLOOD PRESSURE: 142 MMHG | OXYGEN SATURATION: 98 % | HEART RATE: 74 BPM | DIASTOLIC BLOOD PRESSURE: 92 MMHG

## 2022-09-02 DIAGNOSIS — R05.9 COUGH: ICD-10-CM

## 2022-09-02 DIAGNOSIS — I10 HYPERTENSION, BENIGN: Primary | ICD-10-CM

## 2022-09-02 DIAGNOSIS — R25.1 TREMOR: ICD-10-CM

## 2022-09-02 PROCEDURE — 99999 PR PBB SHADOW E&M-EST. PATIENT-LVL II: ICD-10-PCS | Mod: PBBFAC,,,

## 2022-09-02 PROCEDURE — 99999 PR PBB SHADOW E&M-EST. PATIENT-LVL II: CPT | Mod: PBBFAC,,,

## 2022-09-02 RX ORDER — PROPRANOLOL HYDROCHLORIDE 60 MG/1
60 CAPSULE, EXTENDED RELEASE ORAL DAILY
Qty: 30 CAPSULE | Refills: 1 | Status: SHIPPED | OUTPATIENT
Start: 2022-09-02 | End: 2022-10-03 | Stop reason: SDUPTHER

## 2022-09-02 NOTE — PROGRESS NOTES
Trupti Coronado 62 y.o. female is here for Blood Pressure check. in person    Manual Blood pressure reading was  142/92, Pulse 74. (Checked at the end of the visit)    If high, was it repeated after 15 minutes? yes    Diagnosed with Hypertension no.    Patient took blood pressure medication today no.  Last dose of blood pressure medication was taken at Not taking blood pressure meds. Patient took Not on blood pressure medication.     All Medications and OTC medication updated yes    Does patient have record of home blood pressure readings / Blood Pressure Log no.     Does the pt have any complaints today in regards to their blood pressure medication? no. Complains of na. Patient is asymptomatic.     Were you sitting still for 5-10 minutes prior to taking your Blood pressure? yes     Has your blood pressure monitor ever been checked? no When was last time we checked your blood pressure monitor? NA    Updated vitals yes    Follow up date is 09/09/22.       Ms. Coronado states that she would like to take the time release propranolol 3 times a day that was discuss at office visit.  Patient states that she cannot remember to take the second dose.  Patient also states that she would like to try exercising more.  Patient states that she still has the continuous cough and the Zyrtec is not helping her.  Patient states that the CT that was ordered needs to go to [facility] in order for her insurance to pay for it.    Dr. Barrera notified.     Creatinine   Date Value Ref Range Status   08/09/2022 0.8 0.5 - 1.4 mg/dL Final     Sodium   Date Value Ref Range Status   08/09/2022 141 136 - 145 mmol/L Final     Potassium   Date Value Ref Range Status   08/09/2022 4.1 3.5 - 5.1 mmol/L Final

## 2022-09-02 NOTE — TELEPHONE ENCOUNTER
Please let her know that I sent in the extended release propranolol to take once daily at lowest dose of 60mg   - we will plan to repeat her bp at her appt with me in 1 week   Signed order for CT chest for [facility] - please fax

## 2022-09-09 ENCOUNTER — OFFICE VISIT (OUTPATIENT)
Dept: INTERNAL MEDICINE | Facility: CLINIC | Age: 62
End: 2022-09-09
Attending: INTERNAL MEDICINE
Payer: COMMERCIAL

## 2022-09-09 VITALS
DIASTOLIC BLOOD PRESSURE: 82 MMHG | HEART RATE: 85 BPM | HEIGHT: 62 IN | OXYGEN SATURATION: 98 % | BODY MASS INDEX: 36.39 KG/M2 | SYSTOLIC BLOOD PRESSURE: 144 MMHG | WEIGHT: 197.75 LBS

## 2022-09-09 DIAGNOSIS — I10 HYPERTENSION, BENIGN: Primary | ICD-10-CM

## 2022-09-09 DIAGNOSIS — F43.20 ADJUSTMENT DISORDER, UNSPECIFIED TYPE: ICD-10-CM

## 2022-09-09 DIAGNOSIS — R22.1 NECK FULLNESS: ICD-10-CM

## 2022-09-09 DIAGNOSIS — E66.01 SEVERE OBESITY (BMI 35.0-39.9) WITH COMORBIDITY: ICD-10-CM

## 2022-09-09 DIAGNOSIS — R04.2 HEMOPTYSIS: ICD-10-CM

## 2022-09-09 PROCEDURE — 3079F PR MOST RECENT DIASTOLIC BLOOD PRESSURE 80-89 MM HG: ICD-10-PCS | Mod: CPTII,S$GLB,, | Performed by: INTERNAL MEDICINE

## 2022-09-09 PROCEDURE — 99214 PR OFFICE/OUTPT VISIT, EST, LEVL IV, 30-39 MIN: ICD-10-PCS | Mod: S$GLB,,, | Performed by: INTERNAL MEDICINE

## 2022-09-09 PROCEDURE — 3077F PR MOST RECENT SYSTOLIC BLOOD PRESSURE >= 140 MM HG: ICD-10-PCS | Mod: CPTII,S$GLB,, | Performed by: INTERNAL MEDICINE

## 2022-09-09 PROCEDURE — 3008F BODY MASS INDEX DOCD: CPT | Mod: CPTII,S$GLB,, | Performed by: INTERNAL MEDICINE

## 2022-09-09 PROCEDURE — 1160F RVW MEDS BY RX/DR IN RCRD: CPT | Mod: CPTII,S$GLB,, | Performed by: INTERNAL MEDICINE

## 2022-09-09 PROCEDURE — 3044F HG A1C LEVEL LT 7.0%: CPT | Mod: CPTII,S$GLB,, | Performed by: INTERNAL MEDICINE

## 2022-09-09 PROCEDURE — 99999 PR PBB SHADOW E&M-EST. PATIENT-LVL IV: CPT | Mod: PBBFAC,,, | Performed by: INTERNAL MEDICINE

## 2022-09-09 PROCEDURE — 3077F SYST BP >= 140 MM HG: CPT | Mod: CPTII,S$GLB,, | Performed by: INTERNAL MEDICINE

## 2022-09-09 PROCEDURE — 3079F DIAST BP 80-89 MM HG: CPT | Mod: CPTII,S$GLB,, | Performed by: INTERNAL MEDICINE

## 2022-09-09 PROCEDURE — 3008F PR BODY MASS INDEX (BMI) DOCUMENTED: ICD-10-PCS | Mod: CPTII,S$GLB,, | Performed by: INTERNAL MEDICINE

## 2022-09-09 PROCEDURE — 1159F PR MEDICATION LIST DOCUMENTED IN MEDICAL RECORD: ICD-10-PCS | Mod: CPTII,S$GLB,, | Performed by: INTERNAL MEDICINE

## 2022-09-09 PROCEDURE — 99214 OFFICE O/P EST MOD 30 MIN: CPT | Mod: S$GLB,,, | Performed by: INTERNAL MEDICINE

## 2022-09-09 PROCEDURE — 1159F MED LIST DOCD IN RCRD: CPT | Mod: CPTII,S$GLB,, | Performed by: INTERNAL MEDICINE

## 2022-09-09 PROCEDURE — 1160F PR REVIEW ALL MEDS BY PRESCRIBER/CLIN PHARMACIST DOCUMENTED: ICD-10-PCS | Mod: CPTII,S$GLB,, | Performed by: INTERNAL MEDICINE

## 2022-09-09 PROCEDURE — 3044F PR MOST RECENT HEMOGLOBIN A1C LEVEL <7.0%: ICD-10-PCS | Mod: CPTII,S$GLB,, | Performed by: INTERNAL MEDICINE

## 2022-09-09 PROCEDURE — 99999 PR PBB SHADOW E&M-EST. PATIENT-LVL IV: ICD-10-PCS | Mod: PBBFAC,,, | Performed by: INTERNAL MEDICINE

## 2022-09-09 RX ORDER — HYDROCHLOROTHIAZIDE 12.5 MG/1
12.5 TABLET ORAL DAILY
Qty: 90 TABLET | Refills: 3 | Status: SHIPPED | OUTPATIENT
Start: 2022-09-09 | End: 2023-09-05

## 2022-09-09 RX ORDER — BUPROPION HYDROCHLORIDE 150 MG/1
150 TABLET ORAL DAILY
Qty: 30 TABLET | Refills: 1 | Status: SHIPPED | OUTPATIENT
Start: 2022-09-09 | End: 2022-10-03 | Stop reason: SDUPTHER

## 2022-09-09 RX ORDER — AMOXICILLIN 500 MG
CAPSULE ORAL DAILY
COMMUNITY

## 2022-09-09 NOTE — PROGRESS NOTES
Subjective:   Patient ID: Trupti Coronado is a 62 y.o. female  Chief complaint:   Chief Complaint   Patient presents with    Hypertension     Elevated bp       HPI  Pt here for 4 week follow up of HTN  - utd on annual exam 8/2022  Daughter moving to [place] for grad school [facility]     HTN: switched short acting propranolol to LA prop to address bp and tremor  After parking in garage she received a call that ambulance was at her business  - did not check bp - dues have a bp cuff and agrees to check     62F with hx of gastric surgery, b12 def, CHARIS, vit d def, essential tremor, HTN:    Reports lack of motivation   Apathetic   Not herself   Just moved daughter to [place], [place]  ER is helpful for tremor     She would like to try diuretic next   One cup of coffee in am   No sodas or carbonation     CT [facility] at [facility]  Cxr wnl 8/9/2022    Reviewed recent labs today     Adjustment disorder   Was on 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:   Ordered CT abd/pelvis last year after lcv but not completed for sx discussed at that time - never completed - sx improved   - Reports GI sx reported at 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    B12 def:  Not on for > 1 year - did not get refills   Ran out of B12 inj and ran out - last was 10 months ago    Iron def:   Not taking oral iron other  "mvi     Vit d def:   Taking vit d - 2 otc daily - unsure of dosing      Varicose veins:  - seen by vascular in past   - stable  Prev:   - has periods when gets pressure and pain in left post knee - asymptomatic today   - had US neg for clot     Elevated bp:   Repeat elevated 142/89  - did not take propranolol this am for tremor    Essential tremor:    - nancy propranolol at this time   Prev:  - affects ability to write and bothersome   - seen by neuro previously - discussed follow up to review tx options     Getting dental implant     HM:  hiv screening   shingrix   dexa and mmg ordered through GYN in [place] in 2021 at [facility] - to f/u in Dec 2022/Jan 2023     Review of Systems    Objective:  Vitals:    09/09/22 1005 09/09/22 1038   BP: (!) 158/98 (!) 144/82   BP Location: Left arm    Patient Position: Sitting    Pulse: 85    SpO2: 98%    Weight: 89.7 kg (197 lb 12 oz)    Height: 5' 2" (1.575 m)      Body mass index is 36.17 kg/m².    Physical Exam  Vitals reviewed.   Constitutional:       Appearance: Normal appearance. She is well-developed.   HENT:      Head: Normocephalic and atraumatic.      Right Ear: Tympanic membrane, ear canal and external ear normal.      Left Ear: Tympanic membrane, ear canal and external ear normal.      Nose: Nose normal. No congestion.      Mouth/Throat:      Mouth: Mucous membranes are moist.      Pharynx: Oropharynx is clear. No oropharyngeal exudate.   Eyes:      Extraocular Movements: Extraocular movements intact.      Conjunctiva/sclera: Conjunctivae normal.   Neck:      Thyroid: No thyromegaly.   Cardiovascular:      Rate and Rhythm: Normal rate and regular rhythm.      Pulses: Normal pulses.      Heart sounds: Normal heart sounds.   Pulmonary:      Effort: Pulmonary effort is normal.      Breath sounds: Normal breath sounds.   Abdominal:      General: Bowel sounds are normal.      Palpations: Abdomen is soft.   Musculoskeletal:         General: No swelling or tenderness.      " Cervical back: Neck supple.   Lymphadenopathy:      Cervical: No cervical adenopathy.   Skin:     General: Skin is warm and dry.      Capillary Refill: Capillary refill takes less than 2 seconds.   Neurological:      General: No focal deficit present.      Mental Status: She is alert and oriented to person, place, and time. Mental status is at baseline.   Psychiatric:         Behavior: Behavior normal.         Thought Content: Thought content normal.     Assessment:  No diagnosis found.      Plan:    Annual physical exam  -     Comprehensive Metabolic Panel; Future; Expected date: 08/09/2022  -     CBC Auto Differential; Future; Expected date: 08/09/2022  -     FERRITIN; Future; Expected date: 08/09/2022  -     IRON AND TIBC; Future; Expected date: 08/09/2022  -     Lipid Panel; Future; Expected date: 08/09/2022  -     Vitamin D; Future; Expected date: 08/09/2022  -     TSH; Future; Expected date: 08/09/2022  -     Hemoglobin A1C; Future; Expected date: 08/09/2022  -     Vitamin B12; Future; Expected date: 08/09/2022  -     Vitamin B1; Future; Expected date: 08/09/2022  -     ZINC; Future; Expected date: 08/09/2022  Recommend daily sunscreen, cardiovascular exercise min 30 min 5 days per week. Seatbelts routinely.    Vitamin D deficiency  -     Vitamin D; Future; Expected date: 08/09/2022    B12 deficiency  -     Vitamin B12; Future; Expected date: 08/09/2022  -     cyanocobalamin 1,000 mcg/mL injection; Inject 1 mL (1,000 mcg total) into the skin every 30 days. Please give 1ml syrginges  Dispense: 1 mL; Refill: 11  -     syringe, disposable, 3 mL Syrg; 1 each by Misc.(Non-Drug; Combo Route) route every 30 days. 22 gauge  Dispense: 25 each; Refill: 3    Iron deficiency anemia, unspecified iron deficiency anemia type  -     FERRITIN; Future; Expected date: 08/09/2022  -     IRON AND TIBC; Future; Expected date: 08/09/2022    History of Tatyana-en-Y gastric bypass  -     FERRITIN; Future; Expected date: 08/09/2022  -      IRON AND TIBC; Future; Expected date: 08/09/2022  -     Hemoglobin A1C; Future; Expected date: 08/09/2022  -     Vitamin B12; Future; Expected date: 08/09/2022  -     Vitamin B1; Future; Expected date: 08/09/2022  -     ZINC; Future; Expected date: 08/09/2022    Tremor, essential    Cough  -     X-Ray Chest PA And Lateral; Future; Expected date: 08/09/2022    Hemoptysis  -     QUANTIFERON GOLD TB; Future; Expected date: 08/09/2022    Obesity (BMI 30-39.9)  - cont diet and exercise  - increase intensity and duration of CV exercise to continue weight loss  - goal wt loss one pound per week  - portion control, healthy choices    Iron deficiency    Recommend daily sunscreen, cardiovascular exercise min 30 min 5 days per week. Seatbelts routinely.     Resume supplements   Check approp labs   Check cxr   nv for bp check in 2-4 weeks - if still above goal will rec start meds     Health Maintenance   Topic Date Due    Mammogram  02/03/2023    Lipid Panel  08/09/2027    TETANUS VACCINE  09/18/2028    Hepatitis C Screening  Completed

## 2022-09-13 ENCOUNTER — PATIENT MESSAGE (OUTPATIENT)
Dept: INTERNAL MEDICINE | Facility: CLINIC | Age: 62
End: 2022-09-13
Payer: COMMERCIAL

## 2022-09-13 DIAGNOSIS — N95.9 MENOPAUSAL PROBLEM: Primary | ICD-10-CM

## 2022-09-13 NOTE — TELEPHONE ENCOUNTER
Please let her know that we received results yesterday but I was out of office on Mondays  CT of chest did not show any abnormalities in the chest - good news!   I recommend that she see a pulmonologist as scheduled for further evaluation of her symptoms discussed in clinic recently       - the CT of the chest did show possible osteopenia - I recommend completing a bone density test to further evaluation this at her convenience - order signed - please fax to [facility]    - happy to hear that she is feeling better on the medications! Please thank her for me for the update!

## 2022-09-17 PROBLEM — E66.01 SEVERE OBESITY (BMI 35.0-39.9) WITH COMORBIDITY: Status: ACTIVE | Noted: 2022-09-17

## 2022-09-17 NOTE — PROGRESS NOTES
"Subjective:   Patient ID: Trupti Coronado is a 62 y.o. female  Chief complaint:   Chief Complaint   Patient presents with    Hypertension     Elevated bp       Hypertension    Pt here for 4 week follow up of HTN  - utd on annual exam 8/2022  Daughter moved to [place] for grad school - [place]       62F with hx of gastric surgery, b12 def, CHARIS, vit d def, essential tremor, HTN:    HTN:   - switched short acting propranolol to LA prop to address bp and tremor  - nancy long acting BB - has helped tremor  Bp above goal   - did not check bp - dues have a bp cuff and agrees to check   She would like to try diuretic next If needed  One cup of coffee in am   No sodas or carbonation      Adjustment disorder with depressed mood  - no si/hi/starks  Reports lack of motivation   Apathetic   Not herself over past few months   Just moved daughter to [place], [place]  Was on 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      Hemoptysis:    CT chest to be completed at [facility]  Cxr wnl 8/9/2022  Quant gold neg      Reviewed recent labs today      B12 def: resolved off of suppl  Cont balanced diet   Prev:   Not on for > 1 year - did not get refills   Ran out of B12 inj and ran out - last was 10 months ago     Iron def:   Not taking oral iron other mvi   - resolved       Essential tremor:    - nancy ER propranolol at this time   Prev:  - affects ability to write and bothersome   - seen by neuro previously - discussed follow up to review tx options     HM:  Flu  covid  shingrix     Review of Systems    Objective:  Vitals:    09/09/22 1005 09/09/22 1038   BP: (!) 158/98 (!) 144/82   BP Location: Left arm    Patient Position: Sitting    Pulse: 85    SpO2: 98%    Weight: 89.7 kg (197 lb 12 oz)    Height: 5' 2" (1.575 m)      Body mass index is 36.17 kg/m².    Physical Exam  Vitals reviewed.   Constitutional:       Appearance: Normal appearance. She is well-developed.   HENT:      Head: Normocephalic and atraumatic.   Eyes:      Extraocular " Movements: Extraocular movements intact.      Conjunctiva/sclera: Conjunctivae normal.   Neck:      Thyroid: No thyromegaly.   Cardiovascular:      Rate and Rhythm: Normal rate and regular rhythm.      Pulses: Normal pulses.      Heart sounds: Normal heart sounds.   Pulmonary:      Effort: Pulmonary effort is normal.      Breath sounds: Normal breath sounds.   Abdominal:      General: Bowel sounds are normal.      Palpations: Abdomen is soft.   Musculoskeletal:         General: No swelling or tenderness.      Cervical back: Neck supple.   Lymphadenopathy:      Cervical: No cervical adenopathy.   Skin:     General: Skin is warm and dry.      Capillary Refill: Capillary refill takes less than 2 seconds.   Neurological:      General: No focal deficit present.      Mental Status: She is alert and oriented to person, place, and time. Mental status is at baseline.   Psychiatric:         Behavior: Behavior normal.         Thought Content: Thought content normal.       Assessment:  1. Hypertension, benign    2. Adjustment disorder, unspecified type    3. Neck fullness    4. Hemoptysis    5. Severe obesity (BMI 35.0-39.9) with comorbidity        Plan:  Trupti was seen today for hypertension.    Diagnoses and all orders for this visit:    Hypertension, benign  -     hydroCHLOROthiazide (HYDRODIURIL) 12.5 MG Tab; Take 1 tablet (12.5 mg total) by mouth once daily.  Cont BB and add hctz   Start home monitoring of bp   Low salt diet   Exercise   5% weight loss     Adjustment disorder, unspecified type  -     buPROPion (WELLBUTRIN XL) 150 MG TB24 tablet; Take 1 tablet (150 mg total) by mouth once daily.  Start trial of wellbutrin   Potential side effects of medication discussed with patient. If the patient has any issues with medication, patient  understands to let me know. Return to clinic and ER prompts given.   If any issues with med she iwll let me know   F/u visit in 4-6 weeks for med check     Neck fullness  -     US Soft  Tissue Head Neck Thyroid; Future  Feels fullness     Hemoptysis  -     Ambulatory referral/consult to Pulmonology; Future  Check CT chest and schedule f/u with pulm     Severe obesity (BMI 35.0-39.9) with comorbidity  - cont diet and exercise  - increase intensity and duration of CV exercise to continue weight loss  - goal wt loss one pound per week  - portion control, healthy choices      Health Maintenance   Topic Date Due    Mammogram  02/03/2023    Lipid Panel  08/09/2027    TETANUS VACCINE  09/18/2028    Hepatitis C Screening  Completed

## 2022-09-30 ENCOUNTER — TELEPHONE (OUTPATIENT)
Dept: INTERNAL MEDICINE | Facility: CLINIC | Age: 62
End: 2022-09-30
Payer: COMMERCIAL

## 2022-09-30 NOTE — TELEPHONE ENCOUNTER
Please let pt know that her bone density results returned from [facility] and it was normal - great news! Have a good weekend!

## 2022-10-03 ENCOUNTER — OFFICE VISIT (OUTPATIENT)
Dept: PULMONOLOGY | Facility: CLINIC | Age: 62
End: 2022-10-03
Payer: COMMERCIAL

## 2022-10-03 ENCOUNTER — PATIENT MESSAGE (OUTPATIENT)
Dept: INTERNAL MEDICINE | Facility: CLINIC | Age: 62
End: 2022-10-03
Payer: COMMERCIAL

## 2022-10-03 VITALS
BODY MASS INDEX: 35.2 KG/M2 | HEART RATE: 69 BPM | SYSTOLIC BLOOD PRESSURE: 128 MMHG | WEIGHT: 192.44 LBS | DIASTOLIC BLOOD PRESSURE: 76 MMHG | OXYGEN SATURATION: 97 %

## 2022-10-03 DIAGNOSIS — R04.2 HEMOPTYSIS: ICD-10-CM

## 2022-10-03 DIAGNOSIS — R05.8 POST-VIRAL COUGH SYNDROME: Primary | ICD-10-CM

## 2022-10-03 DIAGNOSIS — R25.1 TREMOR: ICD-10-CM

## 2022-10-03 DIAGNOSIS — F43.20 ADJUSTMENT DISORDER, UNSPECIFIED TYPE: ICD-10-CM

## 2022-10-03 DIAGNOSIS — I10 HYPERTENSION, BENIGN: ICD-10-CM

## 2022-10-03 PROCEDURE — 1159F MED LIST DOCD IN RCRD: CPT | Mod: CPTII,S$GLB,, | Performed by: INTERNAL MEDICINE

## 2022-10-03 PROCEDURE — 3008F PR BODY MASS INDEX (BMI) DOCUMENTED: ICD-10-PCS | Mod: CPTII,S$GLB,, | Performed by: INTERNAL MEDICINE

## 2022-10-03 PROCEDURE — 99203 PR OFFICE/OUTPT VISIT, NEW, LEVL III, 30-44 MIN: ICD-10-PCS | Mod: S$GLB,,, | Performed by: INTERNAL MEDICINE

## 2022-10-03 PROCEDURE — 3074F PR MOST RECENT SYSTOLIC BLOOD PRESSURE < 130 MM HG: ICD-10-PCS | Mod: CPTII,S$GLB,, | Performed by: INTERNAL MEDICINE

## 2022-10-03 PROCEDURE — 3044F PR MOST RECENT HEMOGLOBIN A1C LEVEL <7.0%: ICD-10-PCS | Mod: CPTII,S$GLB,, | Performed by: INTERNAL MEDICINE

## 2022-10-03 PROCEDURE — 3008F BODY MASS INDEX DOCD: CPT | Mod: CPTII,S$GLB,, | Performed by: INTERNAL MEDICINE

## 2022-10-03 PROCEDURE — 3074F SYST BP LT 130 MM HG: CPT | Mod: CPTII,S$GLB,, | Performed by: INTERNAL MEDICINE

## 2022-10-03 PROCEDURE — 3044F HG A1C LEVEL LT 7.0%: CPT | Mod: CPTII,S$GLB,, | Performed by: INTERNAL MEDICINE

## 2022-10-03 PROCEDURE — 3078F DIAST BP <80 MM HG: CPT | Mod: CPTII,S$GLB,, | Performed by: INTERNAL MEDICINE

## 2022-10-03 PROCEDURE — 1159F PR MEDICATION LIST DOCUMENTED IN MEDICAL RECORD: ICD-10-PCS | Mod: CPTII,S$GLB,, | Performed by: INTERNAL MEDICINE

## 2022-10-03 PROCEDURE — 99999 PR PBB SHADOW E&M-EST. PATIENT-LVL IV: ICD-10-PCS | Mod: PBBFAC,,, | Performed by: INTERNAL MEDICINE

## 2022-10-03 PROCEDURE — 99999 PR PBB SHADOW E&M-EST. PATIENT-LVL IV: CPT | Mod: PBBFAC,,, | Performed by: INTERNAL MEDICINE

## 2022-10-03 PROCEDURE — 99203 OFFICE O/P NEW LOW 30 MIN: CPT | Mod: S$GLB,,, | Performed by: INTERNAL MEDICINE

## 2022-10-03 PROCEDURE — 3078F PR MOST RECENT DIASTOLIC BLOOD PRESSURE < 80 MM HG: ICD-10-PCS | Mod: CPTII,S$GLB,, | Performed by: INTERNAL MEDICINE

## 2022-10-03 RX ORDER — PROPRANOLOL HYDROCHLORIDE 60 MG/1
60 CAPSULE, EXTENDED RELEASE ORAL DAILY
Qty: 90 CAPSULE | Refills: 3 | Status: SHIPPED | OUTPATIENT
Start: 2022-10-03 | End: 2023-10-08

## 2022-10-03 RX ORDER — BUPROPION HYDROCHLORIDE 150 MG/1
150 TABLET ORAL DAILY
Qty: 90 TABLET | Refills: 3 | Status: SHIPPED | OUTPATIENT
Start: 2022-10-03 | End: 2023-11-13

## 2022-10-03 NOTE — TELEPHONE ENCOUNTER
No new care gaps identified.  [facility] Embedded Care Gaps. Reference number: [id number]. 10/03/2022   2:32:20 PM CDT

## 2022-10-03 NOTE — PROGRESS NOTES
Subjective:       Patient ID: Trupti Coronado is a 62 y.o. female.    Chief Complaint: No chief complaint on file.    Pt is a 61 yo W pmh Obesity s/p Tatyana-en-y gastric bypass, essential tremor, venous insufficiency who presents with complaint of cough, hemoptysis. Started in 1/2022 after she had covid. Symptoms included cough, very mild SOB, h/a, fatigue. Hemoptysis was very scant mixed with whitish phlegm and resolved on its own. Currently cough is improving. Takes delsom and nyquil with improvement in symptoms. Tried Zyrtec and albuterol without relief.     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   Blood thinners: none    Review of Systems   Constitutional:  Negative for fever, activity change, fatigue and weakness.   HENT:  Negative for postnasal drip, sinus pressure, sore throat and congestion.    Respiratory:  Positive for cough and sputum production (minimal to dry). Negative for shortness of breath, dyspnea on extertion and use of rescue inhaler.    Cardiovascular:  Negative for chest pain, palpitations and leg swelling.   Musculoskeletal:  Negative for arthralgias and myalgias.     Objective:      Physical Exam   Constitutional: She is oriented to person, place, and time. She appears well-developed and well-nourished. She appears not cachectic. No distress.   Cardiovascular: Normal rate and regular rhythm. Exam reveals no gallop and no friction rub.   No murmur heard.  Pulmonary/Chest: Normal expansion, symmetric chest wall expansion, effort normal and breath sounds normal. She has no decreased breath sounds. She has no wheezes. She has no rhonchi. She has no rales.   Musculoskeletal:         General: No edema.   Neurological: She is alert and oriented to person, place, and time.   Skin: No cyanosis. Nails show no clubbing.   Psychiatric: She has a normal mood and affect. Her behavior is normal. Judgment and thought content normal.   Personal Diagnostic Review  Chest x-ray:  8/09/22  No acute cardiopulmonary process.    No flowsheet data found.      Assessment:       1. Post-viral cough syndrome    2. Hemoptysis        Outpatient Encounter Medications as of 10/3/2022   Medication Sig Dispense Refill    BIOTIN ORAL Take by mouth.      buPROPion (WELLBUTRIN XL) 150 MG TB24 tablet Take 1 tablet (150 mg total) by mouth once daily. 30 tablet 1    calcium citrate (CALCITRATE) 200 mg (950 mg) tablet Take 1 tablet by mouth once daily.      cholecalciferol, vitamin D3, (VITAMIN D3) 2,000 unit Cap Take 1 capsule (2,000 Units total) by mouth once daily.      hydroCHLOROthiazide (HYDRODIURIL) 12.5 MG Tab Take 1 tablet (12.5 mg total) by mouth once daily. 90 tablet 3    multivitamin capsule Take 1 capsule by mouth once daily.      omega-3 fatty acids/fish oil (FISH OIL-OMEGA-3 FATTY ACIDS) 300-1,000 mg capsule Take by mouth once daily.      pantoprazole (PROTONIX) 40 MG tablet Take 1 tablet (40 mg total) by mouth once daily. (Patient taking differently: Take 40 mg by mouth daily as needed.) 90 tablet 0    propranoloL (INDERAL LA) 60 MG 24 hr capsule Take 1 capsule (60 mg total) by mouth once daily. 30 capsule 1    syringe, disposable, 3 mL Syrg 1 each by Misc.(Non-Drug; Combo Route) route every 30 days. 22 gauge 25 each 3    TURMERIC ORAL Take by mouth.       No facility-administered encounter medications on file as of 10/3/2022.     No orders of the defined types were placed in this encounter.      Plan:       Post Viral cough syndrome:   - cough that started in 01/22 after nicola Covid19 from . Believes she had it or another viral infection in 7/22 with one night of fever, worsening of cough, fatigue.   - unimproved with Zyrtec or albuterol inhaler  - has improved since last being seen by PCP  - continue symptomatic treatment with Nyquil/Delsom   - general takes 3-6 months for majority of recovery.     Hemoptysis:   Scant blood with sputum. Has resolved. Likely upper respiratory  irritation from chronic cough.   - monitor and please return if symptoms restart.  - CXR without abnormality.        Lazaro Morales MD  [facility]

## 2022-10-05 ENCOUNTER — PATIENT OUTREACH (OUTPATIENT)
Dept: INTERNAL MEDICINE | Facility: CLINIC | Age: 62
End: 2022-10-05
Payer: COMMERCIAL

## 2022-10-06 ENCOUNTER — OFFICE VISIT (OUTPATIENT)
Dept: INTERNAL MEDICINE | Facility: CLINIC | Age: 62
End: 2022-10-06
Attending: INTERNAL MEDICINE
Payer: COMMERCIAL

## 2022-10-06 VITALS
BODY MASS INDEX: 35.78 KG/M2 | WEIGHT: 194.44 LBS | OXYGEN SATURATION: 98 % | SYSTOLIC BLOOD PRESSURE: 112 MMHG | HEART RATE: 64 BPM | DIASTOLIC BLOOD PRESSURE: 78 MMHG | HEIGHT: 62 IN

## 2022-10-06 DIAGNOSIS — I10 ESSENTIAL HYPERTENSION: Primary | ICD-10-CM

## 2022-10-06 DIAGNOSIS — F43.21 ADJUSTMENT DISORDER WITH DEPRESSED MOOD: ICD-10-CM

## 2022-10-06 PROBLEM — E66.9 OBESITY (BMI 30-39.9): Status: RESOLVED | Noted: 2021-08-25 | Resolved: 2022-10-06

## 2022-10-06 PROCEDURE — 99999 PR PBB SHADOW E&M-EST. PATIENT-LVL IV: ICD-10-PCS | Mod: PBBFAC,,, | Performed by: INTERNAL MEDICINE

## 2022-10-06 PROCEDURE — 3008F PR BODY MASS INDEX (BMI) DOCUMENTED: ICD-10-PCS | Mod: CPTII,S$GLB,, | Performed by: INTERNAL MEDICINE

## 2022-10-06 PROCEDURE — 3044F PR MOST RECENT HEMOGLOBIN A1C LEVEL <7.0%: ICD-10-PCS | Mod: CPTII,S$GLB,, | Performed by: INTERNAL MEDICINE

## 2022-10-06 PROCEDURE — 1159F PR MEDICATION LIST DOCUMENTED IN MEDICAL RECORD: ICD-10-PCS | Mod: CPTII,S$GLB,, | Performed by: INTERNAL MEDICINE

## 2022-10-06 PROCEDURE — 3044F HG A1C LEVEL LT 7.0%: CPT | Mod: CPTII,S$GLB,, | Performed by: INTERNAL MEDICINE

## 2022-10-06 PROCEDURE — 99213 OFFICE O/P EST LOW 20 MIN: CPT | Mod: S$GLB,,, | Performed by: INTERNAL MEDICINE

## 2022-10-06 PROCEDURE — 3078F PR MOST RECENT DIASTOLIC BLOOD PRESSURE < 80 MM HG: ICD-10-PCS | Mod: CPTII,S$GLB,, | Performed by: INTERNAL MEDICINE

## 2022-10-06 PROCEDURE — 99213 PR OFFICE/OUTPT VISIT, EST, LEVL III, 20-29 MIN: ICD-10-PCS | Mod: S$GLB,,, | Performed by: INTERNAL MEDICINE

## 2022-10-06 PROCEDURE — 3008F BODY MASS INDEX DOCD: CPT | Mod: CPTII,S$GLB,, | Performed by: INTERNAL MEDICINE

## 2022-10-06 PROCEDURE — 1160F RVW MEDS BY RX/DR IN RCRD: CPT | Mod: CPTII,S$GLB,, | Performed by: INTERNAL MEDICINE

## 2022-10-06 PROCEDURE — 3074F PR MOST RECENT SYSTOLIC BLOOD PRESSURE < 130 MM HG: ICD-10-PCS | Mod: CPTII,S$GLB,, | Performed by: INTERNAL MEDICINE

## 2022-10-06 PROCEDURE — 1159F MED LIST DOCD IN RCRD: CPT | Mod: CPTII,S$GLB,, | Performed by: INTERNAL MEDICINE

## 2022-10-06 PROCEDURE — 99999 PR PBB SHADOW E&M-EST. PATIENT-LVL IV: CPT | Mod: PBBFAC,,, | Performed by: INTERNAL MEDICINE

## 2022-10-06 PROCEDURE — 3078F DIAST BP <80 MM HG: CPT | Mod: CPTII,S$GLB,, | Performed by: INTERNAL MEDICINE

## 2022-10-06 PROCEDURE — 3074F SYST BP LT 130 MM HG: CPT | Mod: CPTII,S$GLB,, | Performed by: INTERNAL MEDICINE

## 2022-10-06 PROCEDURE — 1160F PR REVIEW ALL MEDS BY PRESCRIBER/CLIN PHARMACIST DOCUMENTED: ICD-10-PCS | Mod: CPTII,S$GLB,, | Performed by: INTERNAL MEDICINE

## 2022-10-06 NOTE — PROGRESS NOTES
"Subjective:   Patient ID: Trupti Coronado is a 62 y.o. female  Chief complaint:   Chief Complaint   Patient presents with    Hypertension     F/u       HPI  Here for HTN follow up and med check   - started hctz 12.5 and wellbutrin at v     HTN:  Meli meds - hctz and propranolol   Bp 120/70  LA BB has helped with tremor     Adjustment disorder:   - doing well on Wellbutrin   More energy and feels back to self   More energy and motivated to exercise   Mood is good     Walking on treadmill     Hemoptysis:   Resolved   Seen by pulm and note reviewed   Ct chest completed at [facility] and no abnormalities in chest   Using ppi rarely - 1 every 2-3 weeks   Using less tums     Review of Systems    Objective:  Vitals:    10/06/22 1043 10/06/22 1129   BP: (!) 128/92 112/78   BP Location: Left arm    Patient Position: Sitting    Pulse: 64    SpO2: 98%    Weight: 88.2 kg (194 lb 7.1 oz)    Height: 5' 2" (1.575 m)      Body mass index is 35.56 kg/m².    Physical Exam  Vitals reviewed.   Constitutional:       Appearance: Normal appearance. She is well-developed.   HENT:      Head: Normocephalic and atraumatic.   Eyes:      Extraocular Movements: Extraocular movements intact.      Conjunctiva/sclera: Conjunctivae normal.   Cardiovascular:      Rate and Rhythm: Normal rate and regular rhythm.      Pulses: Normal pulses.      Heart sounds: Normal heart sounds.   Pulmonary:      Effort: Pulmonary effort is normal.      Breath sounds: Normal breath sounds.   Abdominal:      General: Bowel sounds are normal.      Palpations: Abdomen is soft.   Musculoskeletal:         General: No swelling or tenderness.      Cervical back: Normal range of motion and neck supple.   Skin:     General: Skin is warm and dry.      Capillary Refill: Capillary refill takes less than 2 seconds.      Nails: There is no clubbing.   Neurological:      Mental Status: She is alert and oriented to person, place, and time. Mental status is at baseline.      Gait: Gait normal. "   Psychiatric:         Mood and Affect: Mood normal.         Speech: Speech normal.         Behavior: Behavior normal.         Thought Content: Thought content normal.         Judgment: Judgment normal.       Assessment:  1. Essential hypertension    2. Adjustment disorder with depressed mood        Plan:  Trupti was seen today for hypertension.    Diagnoses and all orders for this visit:    Essential hypertension  Controlled   Cont meds     Adjustment disorder with depressed mood  Improved  Cont wellbutrin     Health Maintenance   Topic Date Due    Mammogram  02/03/2023    Lipid Panel  08/09/2027    TETANUS VACCINE  09/18/2028    Hepatitis C Screening  Completed

## 2022-10-10 ENCOUNTER — TELEPHONE (OUTPATIENT)
Dept: INTERNAL MEDICINE | Facility: CLINIC | Age: 62
End: 2022-10-10
Payer: COMMERCIAL

## 2022-10-10 NOTE — TELEPHONE ENCOUNTER
----- Message from Benigno Knight sent at 10/10/2022 12:34 PM CDT -----  Type: Patient Call Back    Who called:Self    What is the request in detail: Pt states that she coming to appt with . Will not attend virtual appt at 7:15am.    Can the clinic reply by [facility]? no    Would the patient rather a call back or a response via [facility]? Call back    Best call back number: [phone] (home)       Additional Information

## 2022-10-10 NOTE — TELEPHONE ENCOUNTER
Patient should come at 7 am for her appointment, otherwise there won't be enough time to see both her and her  if she comes at 7:30. Please let patient know, thank you!

## 2022-10-10 NOTE — TELEPHONE ENCOUNTER
Spoke with pt, she states she is just gonna come to her husbands visit with her. I tried to explain that her visit is for 7am but she insists on just coming with her  for 7:30am.

## 2022-10-11 NOTE — TELEPHONE ENCOUNTER
Plan was for pt to attend her 's appt as discussed at her recent appt with me   I believe an appt was scheduled for her in error

## 2022-11-28 ENCOUNTER — TELEPHONE (OUTPATIENT)
Dept: INTERNAL MEDICINE | Facility: CLINIC | Age: 62
End: 2022-11-28
Payer: COMMERCIAL

## 2022-11-28 DIAGNOSIS — T75.3XXA MOTION SICKNESS, INITIAL ENCOUNTER: Primary | ICD-10-CM

## 2022-11-28 RX ORDER — SCOLOPAMINE TRANSDERMAL SYSTEM 1 MG/1
1 PATCH, EXTENDED RELEASE TRANSDERMAL
Qty: 4 PATCH | Refills: 0 | Status: SHIPPED | OUTPATIENT
Start: 2022-11-28 | End: 2022-12-10

## 2022-11-28 NOTE — TELEPHONE ENCOUNTER
Patient states that herself and  is leaving on a cruise ship for seven days. She wants to know what can she take for nausea if nausea was to develop while traveling. She will like treatment for both patient and . Thanks.     Pharmacy Recommended is:  [facility] [id number] [facility]  Routed to Dr. Barrera for recommendations

## 2022-11-28 NOTE — TELEPHONE ENCOUNTER
----- Message from Pauline Tucker sent at 11/28/2022 12:10 PM CST -----   Name of Who is Calling:     What is the request in detail:  patient request call back in reference to   Nausea Medication  questions /concerns  Please contact to further discuss and advise      Can the clinic reply by [facility]:     What [facility]:  [phone]

## 2022-12-31 ENCOUNTER — PATIENT MESSAGE (OUTPATIENT)
Dept: INTERNAL MEDICINE | Facility: CLINIC | Age: 62
End: 2022-12-31
Payer: COMMERCIAL

## 2023-01-12 ENCOUNTER — LAB VISIT (OUTPATIENT)
Dept: LAB | Facility: OTHER | Age: 63
End: 2023-01-12
Attending: INTERNAL MEDICINE
Payer: COMMERCIAL

## 2023-01-12 ENCOUNTER — OFFICE VISIT (OUTPATIENT)
Dept: INTERNAL MEDICINE | Facility: CLINIC | Age: 63
End: 2023-01-12
Attending: INTERNAL MEDICINE
Payer: COMMERCIAL

## 2023-01-12 VITALS
BODY MASS INDEX: 36.02 KG/M2 | HEIGHT: 62 IN | OXYGEN SATURATION: 98 % | WEIGHT: 195.75 LBS | SYSTOLIC BLOOD PRESSURE: 130 MMHG | DIASTOLIC BLOOD PRESSURE: 71 MMHG | HEART RATE: 68 BPM

## 2023-01-12 DIAGNOSIS — I10 ESSENTIAL HYPERTENSION: ICD-10-CM

## 2023-01-12 DIAGNOSIS — R10.9 ABDOMINAL PAIN, UNSPECIFIED ABDOMINAL LOCATION: ICD-10-CM

## 2023-01-12 DIAGNOSIS — Z98.84 HISTORY OF ROUX-EN-Y GASTRIC BYPASS: ICD-10-CM

## 2023-01-12 DIAGNOSIS — E66.01 SEVERE OBESITY (BMI 35.0-39.9) WITH COMORBIDITY: ICD-10-CM

## 2023-01-12 DIAGNOSIS — R10.9 ABDOMINAL PAIN, UNSPECIFIED ABDOMINAL LOCATION: Primary | ICD-10-CM

## 2023-01-12 DIAGNOSIS — G25.0 TREMOR, ESSENTIAL: ICD-10-CM

## 2023-01-12 DIAGNOSIS — F43.21 ADJUSTMENT DISORDER WITH DEPRESSED MOOD: ICD-10-CM

## 2023-01-12 DIAGNOSIS — Z71.3 ENCOUNTER FOR WEIGHT LOSS COUNSELING: ICD-10-CM

## 2023-01-12 LAB
ALBUMIN SERPL BCP-MCNC: 4 G/DL (ref 3.5–5.2)
ALP SERPL-CCNC: 63 U/L (ref 55–135)
ALT SERPL W/O P-5'-P-CCNC: 26 U/L (ref 10–44)
ANION GAP SERPL CALC-SCNC: 7 MMOL/L (ref 8–16)
AST SERPL-CCNC: 23 U/L (ref 10–40)
BILIRUB SERPL-MCNC: 0.7 MG/DL (ref 0.1–1)
BILIRUB UR QL STRIP: NEGATIVE
BUN SERPL-MCNC: 13 MG/DL (ref 8–23)
CALCIUM SERPL-MCNC: 9.6 MG/DL (ref 8.7–10.5)
CHLORIDE SERPL-SCNC: 102 MMOL/L (ref 95–110)
CLARITY UR REFRACT.AUTO: CLEAR
CO2 SERPL-SCNC: 29 MMOL/L (ref 23–29)
COLOR UR AUTO: YELLOW
CREAT SERPL-MCNC: 0.8 MG/DL (ref 0.5–1.4)
EST. GFR  (NO RACE VARIABLE): >60 ML/MIN/1.73 M^2
GLUCOSE SERPL-MCNC: 106 MG/DL (ref 70–110)
GLUCOSE UR QL STRIP: NEGATIVE
HGB UR QL STRIP: NEGATIVE
KETONES UR QL STRIP: NEGATIVE
LEUKOCYTE ESTERASE UR QL STRIP: NEGATIVE
LIPASE SERPL-CCNC: 6 U/L (ref 4–60)
NITRITE UR QL STRIP: NEGATIVE
PH UR STRIP: 6 [PH] (ref 5–8)
POTASSIUM SERPL-SCNC: 3.9 MMOL/L (ref 3.5–5.1)
PROT SERPL-MCNC: 7 G/DL (ref 6–8.4)
PROT UR QL STRIP: NEGATIVE
SODIUM SERPL-SCNC: 138 MMOL/L (ref 136–145)
SP GR UR STRIP: 1.01 (ref 1–1.03)
URN SPEC COLLECT METH UR: NORMAL

## 2023-01-12 PROCEDURE — 1159F MED LIST DOCD IN RCRD: CPT | Mod: CPTII,S$GLB,, | Performed by: INTERNAL MEDICINE

## 2023-01-12 PROCEDURE — 1160F PR REVIEW ALL MEDS BY PRESCRIBER/CLIN PHARMACIST DOCUMENTED: ICD-10-PCS | Mod: CPTII,S$GLB,, | Performed by: INTERNAL MEDICINE

## 2023-01-12 PROCEDURE — 99999 PR PBB SHADOW E&M-EST. PATIENT-LVL IV: ICD-10-PCS | Mod: PBBFAC,,, | Performed by: INTERNAL MEDICINE

## 2023-01-12 PROCEDURE — 3078F DIAST BP <80 MM HG: CPT | Mod: CPTII,S$GLB,, | Performed by: INTERNAL MEDICINE

## 2023-01-12 PROCEDURE — 3008F BODY MASS INDEX DOCD: CPT | Mod: CPTII,S$GLB,, | Performed by: INTERNAL MEDICINE

## 2023-01-12 PROCEDURE — 1160F RVW MEDS BY RX/DR IN RCRD: CPT | Mod: CPTII,S$GLB,, | Performed by: INTERNAL MEDICINE

## 2023-01-12 PROCEDURE — 3078F PR MOST RECENT DIASTOLIC BLOOD PRESSURE < 80 MM HG: ICD-10-PCS | Mod: CPTII,S$GLB,, | Performed by: INTERNAL MEDICINE

## 2023-01-12 PROCEDURE — 80053 COMPREHEN METABOLIC PANEL: CPT | Performed by: INTERNAL MEDICINE

## 2023-01-12 PROCEDURE — 81003 URINALYSIS AUTO W/O SCOPE: CPT | Performed by: INTERNAL MEDICINE

## 2023-01-12 PROCEDURE — 1159F PR MEDICATION LIST DOCUMENTED IN MEDICAL RECORD: ICD-10-PCS | Mod: CPTII,S$GLB,, | Performed by: INTERNAL MEDICINE

## 2023-01-12 PROCEDURE — 36415 COLL VENOUS BLD VENIPUNCTURE: CPT | Performed by: INTERNAL MEDICINE

## 2023-01-12 PROCEDURE — 3075F SYST BP GE 130 - 139MM HG: CPT | Mod: CPTII,S$GLB,, | Performed by: INTERNAL MEDICINE

## 2023-01-12 PROCEDURE — 3075F PR MOST RECENT SYSTOLIC BLOOD PRESS GE 130-139MM HG: ICD-10-PCS | Mod: CPTII,S$GLB,, | Performed by: INTERNAL MEDICINE

## 2023-01-12 PROCEDURE — 99999 PR PBB SHADOW E&M-EST. PATIENT-LVL IV: CPT | Mod: PBBFAC,,, | Performed by: INTERNAL MEDICINE

## 2023-01-12 PROCEDURE — 3008F PR BODY MASS INDEX (BMI) DOCUMENTED: ICD-10-PCS | Mod: CPTII,S$GLB,, | Performed by: INTERNAL MEDICINE

## 2023-01-12 PROCEDURE — 99214 PR OFFICE/OUTPT VISIT, EST, LEVL IV, 30-39 MIN: ICD-10-PCS | Mod: S$GLB,,, | Performed by: INTERNAL MEDICINE

## 2023-01-12 PROCEDURE — 99214 OFFICE O/P EST MOD 30 MIN: CPT | Mod: S$GLB,,, | Performed by: INTERNAL MEDICINE

## 2023-01-12 PROCEDURE — 83690 ASSAY OF LIPASE: CPT | Performed by: INTERNAL MEDICINE

## 2023-01-12 RX ORDER — SEMAGLUTIDE 0.25 MG/.5ML
0.25 INJECTION, SOLUTION SUBCUTANEOUS
Qty: 2 ML | Refills: 0 | Status: SHIPPED | OUTPATIENT
Start: 2023-01-12 | End: 2023-02-07

## 2023-01-12 NOTE — PROGRESS NOTES
Subjective:   Patient ID: Trupti Coronado is a 62 y.o. female  Chief complaint:   Chief Complaint   Patient presents with    Hernia     F/u about getting CT scan    Obesity     Would like to discuss weigh loss options       HPI  62F with hx of gastric surgery, b12 def, KALLIE, vit d def, essential tremor:    Went on 10 day boat - had panic attack when snorkeling     Hx of gastric bypass - she is concerned that has hernia that needs to be repaired   - intermittent abd pain   - seen 8/2021 and deferred and ct abd not completed due to Kallie and then sx stable but still present   - if bears down she reports abd contents shift to right     - using Morning Complete pre and probiotic and helping to have bm    - intermittent abd pain - located in different places epigastric and right and at times on left upper abd   - pain achy and occurrence is varriable - 3 times per week to every 2 weeks   - feels like has to have a bm once pain starts   + bloatiing  Eating can occ help but often worsens sx   - pain can last 1h -2 hours     - prior abd surgeries - gastric surgery, hysterectomy   Interested in seeing:  Dr. Kanu Ramirez   - no reflux - will use protonix a couple of times per month   - using tums intermittently a couple of times per week but not bc of sx but bc enjoy tastes    No blood in stool   No hematemesis  No unexplained weight loss   No dysuria    Having bm daily     HTN:  Meli meds - hctz and propranolol   Bp 120/70  LA BB has helped with tremor     Obesity bmi 35:  Would like to start wegovy   - no hx of pancreatitis or thyroid cancer     Adjustment disorder:   - doing well on Wellbutrin   More energy and feels back to self   More energy and motivated to exercise   Mood is good     Walking on treadmill for exercose    Hemoptysis:   Resolved   Seen by pulm and note reviewed   Ct chest completed at [facility] and no abnormalities in chest   Using ppi rarely - 1 every 2-3 weeks   Using less tums     B12 def: resolved off of  "suppl  Cont balanced diet   Prev:   Not on for > 1 year - did not get refills   Ran out of B12 inj and ran out - last was 10 months ago     Iron def:   Not taking oral iron other mvi   - resolved       Essential tremor:    - nancy ER propranolol at this time   - improved  Prev:  - affects ability to write and bothersome   - seen by neuro previously - discussed follow up to review tx options    GERD:  - stable   Sx intermittent - only taking ppi a couple of times per month when needed   At [facility]:   Ordered CT abd/pelvis last year after lcv but not completed for sx discussed at that time - never completed - sx improved   - Reports GI sx reported at [facility] improved - Covid pandemic interfered with those appt follow up     Review of Systems    Objective:  Vitals:    01/12/23 1032 01/12/23 1113   BP: (!) 144/90 130/71   BP Location: Left arm    Patient Position: Sitting    Pulse: 68    SpO2: 98%    Weight: 88.8 kg (195 lb 12.3 oz)    Height: 5' 2" (1.575 m)      Body mass index is 35.81 kg/m².    Physical Exam  Vitals reviewed.   Constitutional:       Appearance: Normal appearance. She is well-developed.   HENT:      Head: Normocephalic and atraumatic.   Eyes:      Extraocular Movements: Extraocular movements intact.      Conjunctiva/sclera: Conjunctivae normal.   Cardiovascular:      Rate and Rhythm: Normal rate and regular rhythm.      Pulses: Normal pulses.      Heart sounds: Normal heart sounds.   Pulmonary:      Effort: Pulmonary effort is normal.      Breath sounds: Normal breath sounds.   Abdominal:      General: Bowel sounds are normal.      Palpations: Abdomen is soft.   Musculoskeletal:         General: No swelling or tenderness.      Cervical back: Normal range of motion and neck supple.   Skin:     General: Skin is warm and dry.      Capillary Refill: Capillary refill takes less than 2 seconds.      Nails: There is no clubbing.   Neurological:      Mental Status: She is alert and oriented to person, place, and time. " Mental status is at baseline.      Gait: Gait normal.   Psychiatric:         Mood and Affect: Mood normal.         Speech: Speech normal.         Behavior: Behavior normal.         Thought Content: Thought content normal.         Judgment: Judgment normal.       Assessment:  1. Abdominal pain, unspecified abdominal location    2. Tremor, essential    3. History of Tatyana-en-Y gastric bypass    4. Severe obesity (BMI 35.0-39.9) with comorbidity    5. Encounter for weight loss counseling    6. Essential hypertension    7. Adjustment disorder with depressed mood        Plan:  Trupti was seen today for hernia and obesity.    Diagnoses and all orders for this visit:    Abdominal pain, unspecified abdominal location  -     Ambulatory referral/consult to General Surgery; Future  -     CT Abdomen Pelvis W Wo Contrast; Future  -     Lipase; Future  -     Comprehensive Metabolic Panel; Future  -     Urinalysis, Reflex to Urine Culture Urine, Clean Catch; Future  Check approp studies   F/u with gen surg   Rtc prn     Tremor, essential  Stable   Cont bb     History of Tatyana-en-Y gastric bypass  -     Ambulatory referral/consult to General Surgery; Future  -     CT Abdomen Pelvis W Wo Contrast; Future    Severe obesity (BMI 35.0-39.9) with comorbidity  -     semaglutide, weight loss, (WEGOVY) 0.25 mg/0.5 mL PnIj; Inject 0.25 mg into the skin every 7 days.  - cont diet and exercise  - increase intensity and duration of CV exercise to continue weight loss  - goal wt loss one pound per week  - portion control, healthy choices    Encounter for weight loss counseling  -     semaglutide, weight loss, (WEGOVY) 0.25 mg/0.5 mL PnIj; Inject 0.25 mg into the skin every 7 days.  No hx of pancreatitis or thyroid cancer   Potential side effects of medication discussed with patient. If the patient has any issues with medication, patient  understands to let me know. Return to clinic and ER prompts given.     Essential hypertension  Stable   Cont  med     Adjustment disorder with depressed mood  Stable  Cont wellbutrin     Health Maintenance   Topic Date Due    Mammogram  02/03/2023    Lipid Panel  08/09/2027    TETANUS VACCINE  09/18/2028    Hepatitis C Screening  Completed

## 2023-01-13 ENCOUNTER — TELEPHONE (OUTPATIENT)
Dept: ORTHOPEDICS | Facility: CLINIC | Age: 63
End: 2023-01-13
Payer: COMMERCIAL

## 2023-01-23 PROBLEM — F43.21 ADJUSTMENT DISORDER WITH DEPRESSED MOOD: Status: ACTIVE | Noted: 2023-01-23

## 2023-01-23 PROBLEM — I10 ESSENTIAL HYPERTENSION: Status: ACTIVE | Noted: 2023-01-23

## 2023-01-28 ENCOUNTER — PATIENT MESSAGE (OUTPATIENT)
Dept: INTERNAL MEDICINE | Facility: CLINIC | Age: 63
End: 2023-01-28
Payer: COMMERCIAL

## 2023-02-06 ENCOUNTER — PATIENT MESSAGE (OUTPATIENT)
Dept: INTERNAL MEDICINE | Facility: CLINIC | Age: 63
End: 2023-02-06
Payer: COMMERCIAL

## 2023-02-06 DIAGNOSIS — Z71.3 ENCOUNTER FOR WEIGHT LOSS COUNSELING: Primary | ICD-10-CM

## 2023-02-06 DIAGNOSIS — E66.01 SEVERE OBESITY (BMI 35.0-39.9) WITH COMORBIDITY: ICD-10-CM

## 2023-02-07 RX ORDER — TIRZEPATIDE 2.5 MG/.5ML
2.5 INJECTION, SOLUTION SUBCUTANEOUS
Qty: 4 PEN | Refills: 0 | Status: SHIPPED | OUTPATIENT
Start: 2023-02-07 | End: 2024-02-27

## 2023-02-07 NOTE — TELEPHONE ENCOUNTER
Care Due:                  Date            Visit Type   Department     Provider  --------------------------------------------------------------------------------                                EP -                              PRIMARY      [facility] INTERNAL  Last Visit: 01-      CARE ([facility])   MEDICINE       Neena Barrera  Next Visit: None Scheduled  None         None Found                                                            Last  Test          Frequency    Reason                     Performed    Due Date  --------------------------------------------------------------------------------    HBA1C.......  6 months...  semaglutide,.............  08- 02-    [facility] Embedded Care Gaps. Reference number: [id number]. 2/07/2023   2:01:40 PM CST

## 2023-02-08 ENCOUNTER — TELEPHONE (OUTPATIENT)
Dept: INTERNAL MEDICINE | Facility: CLINIC | Age: 63
End: 2023-02-08
Payer: COMMERCIAL

## 2023-02-08 NOTE — TELEPHONE ENCOUNTER
----- Message from Bridgett Mackay sent at 2/7/2023  4:25 PM CST -----  Regarding: CALL BACK  .Type: Patient Call Back     Who called: DIGNA DIAZ [[id number]]     What is the request in detail: Pharmacist stated they do not fill this medication: tirzepatide (MOUNJARO) 2.5 mg/0.5 mL PnIj 4 pen  unless the patient has diabetes.Please contact to further discuss and advise.      Can the clinic reply by [facility]? NO     Would the patient rather a call back or a response via [facility]? CALL BACK     Best call back number: [facility] [phone]     Additional Information:N/A

## 2023-02-08 NOTE — TELEPHONE ENCOUNTER
----- Message from Bridgett Mackay sent at 2/7/2023  4:25 PM CST -----  Regarding: CALL BACK  .Type: Patient Call Back    Who called: DIGNA DIAZ [[id number]]    What is the request in detail: Pharmacist stated they do not fill this medication: tirzepatide (MOUNJARO) 2.5 mg/0.5 mL PnIj 4 pen  unless the patient has diabetes.Please contact to further discuss and advise.     Can the clinic reply by [facility]? NO    Would the patient rather a call back or a response via [facility]? CALL BACK    Best call back number: [facility] [phone]    Additional Information:N/A

## 2023-06-12 NOTE — PROGRESS NOTES
"Patient was given vaccine information sheet for the Tdap immunization. The area of injection was palpated using the acromion process as a landmark. This area was cleaned with alcohol. Using a 25g 1" safety needle, 0.5mL of the vaccine was placed into the left deltoid muscle. The injection site was dressed with a bandage. Patient experienced no complications and was discharged in stable condition. Tdap Lot: [id number] Exp: 02/06/2020  " normal

## 2023-09-05 DIAGNOSIS — I10 HYPERTENSION, BENIGN: ICD-10-CM

## 2023-09-05 RX ORDER — HYDROCHLOROTHIAZIDE 12.5 MG/1
12.5 TABLET ORAL
Qty: 90 TABLET | Refills: 1 | Status: SHIPPED | OUTPATIENT
Start: 2023-09-05 | End: 2024-01-02 | Stop reason: SDUPTHER

## 2023-09-05 NOTE — TELEPHONE ENCOUNTER
Refill Decision Note   Trupti Cliff  is requesting a refill authorization.  Brief Assessment and Rationale for Refill:  Approve     Medication Therapy Plan:         Comments:     Note composed:2:29 PM 09/05/2023

## 2023-09-05 NOTE — TELEPHONE ENCOUNTER
No care due was identified.  [facility] Embedded Care Due Messages. Reference number: [id number].   9/05/2023 11:01:09 AM CDT

## 2023-10-07 DIAGNOSIS — R25.1 TREMOR: ICD-10-CM

## 2023-10-07 DIAGNOSIS — I10 HYPERTENSION, BENIGN: ICD-10-CM

## 2023-10-07 NOTE — TELEPHONE ENCOUNTER
No care due was identified.  Health [facility] Embedded Care Due Messages. Reference number: [id number].   10/07/2023 12:17:35 PM CDT

## 2023-10-08 RX ORDER — PROPRANOLOL HYDROCHLORIDE 60 MG/1
60 CAPSULE, EXTENDED RELEASE ORAL
Qty: 90 CAPSULE | Refills: 1 | Status: SHIPPED | OUTPATIENT
Start: 2023-10-08

## 2023-10-08 NOTE — TELEPHONE ENCOUNTER
Refill Decision Note   Trupti Cliff  is requesting a refill authorization.  Brief Assessment and Rationale for Refill:  Approve     Medication Therapy Plan:         Comments:     Note composed:2:03 AM 10/08/2023

## 2023-11-13 DIAGNOSIS — F43.20 ADJUSTMENT DISORDER, UNSPECIFIED TYPE: ICD-10-CM

## 2023-11-13 RX ORDER — BUPROPION HYDROCHLORIDE 150 MG/1
150 TABLET ORAL
Qty: 90 TABLET | Refills: 3 | Status: SHIPPED | OUTPATIENT
Start: 2023-11-13

## 2023-11-13 NOTE — TELEPHONE ENCOUNTER
----- Message from Patricia London sent at 11/13/2023  8:39 AM CST -----  Type: RX Refill Request    Who Called:   self     Have you contacted your pharmacy: yes     Refill or New Rx: refill    RX Name and Strength: buPROPion (WELLBUTRIN XL) 150 MG TB24 tablet      Preferred Pharmacy with phone number: [facility] - [address]  Phone: [phone] [phone]  Fax: [phone]    Local or Mail Order: local    Would the patient rather a call back or a response via [facility]?  call    Best Call Back Number: .[phone] (home)      Additional Information:

## 2023-11-13 NOTE — TELEPHONE ENCOUNTER
Care Due:                  Date            Visit Type   Department     Provider  --------------------------------------------------------------------------------                                EP -                              PRIMARY      [facility] INTERNAL  Last Visit: 01-      CARE ([facility])   MEDICINE       Neena Barrera  Next Visit: None Scheduled  None         None Found                                                            Last  Test          Frequency    Reason                     Performed    Due Date  --------------------------------------------------------------------------------    CMP.........  12 months..  hydroCHLOROthiazide......  01- 01-    HBA1C.......  6 months...  tirzepatide..............  08- 02-    [employer] Embedded Care Due Messages. Reference number: [id number].   11/13/2023 8:44:42 AM CST

## 2024-01-02 DIAGNOSIS — I10 HYPERTENSION, BENIGN: ICD-10-CM

## 2024-01-02 RX ORDER — HYDROCHLOROTHIAZIDE 12.5 MG/1
12.5 TABLET ORAL DAILY
Qty: 90 TABLET | Refills: 0 | Status: SHIPPED | OUTPATIENT
Start: 2024-01-02

## 2024-01-02 NOTE — TELEPHONE ENCOUNTER
No care due was identified.  Health [facility] Embedded Care Due Messages. Reference number: [id number].   1/02/2024 7:48:31 AM CST

## 2024-01-03 NOTE — TELEPHONE ENCOUNTER
Refill Decision Note   Trupti Coronado  is requesting a refill authorization.  Brief Assessment and Rationale for Refill:  Approve     Medication Therapy Plan:       Medication Reconciliation Completed: No   Comments:     No Care Gaps recommended.     Note composed:8:07 PM 01/02/2024

## 2024-02-01 ENCOUNTER — TELEPHONE (OUTPATIENT)
Dept: INTERNAL MEDICINE | Facility: CLINIC | Age: 64
End: 2024-02-01
Payer: MEDICARE

## 2024-02-27 ENCOUNTER — LAB VISIT (OUTPATIENT)
Dept: LAB | Facility: OTHER | Age: 64
End: 2024-02-27
Attending: INTERNAL MEDICINE
Payer: MEDICARE

## 2024-02-27 ENCOUNTER — OFFICE VISIT (OUTPATIENT)
Dept: INTERNAL MEDICINE | Facility: CLINIC | Age: 64
End: 2024-02-27
Attending: INTERNAL MEDICINE
Payer: MEDICARE

## 2024-02-27 VITALS
SYSTOLIC BLOOD PRESSURE: 110 MMHG | HEIGHT: 62 IN | BODY MASS INDEX: 25.15 KG/M2 | OXYGEN SATURATION: 99 % | DIASTOLIC BLOOD PRESSURE: 80 MMHG | WEIGHT: 136.69 LBS | HEART RATE: 76 BPM

## 2024-02-27 DIAGNOSIS — E55.9 VITAMIN D DEFICIENCY: ICD-10-CM

## 2024-02-27 DIAGNOSIS — R73.01 IFG (IMPAIRED FASTING GLUCOSE): ICD-10-CM

## 2024-02-27 DIAGNOSIS — I10 ESSENTIAL HYPERTENSION: ICD-10-CM

## 2024-02-27 DIAGNOSIS — Z71.3 ENCOUNTER FOR WEIGHT LOSS COUNSELING: ICD-10-CM

## 2024-02-27 DIAGNOSIS — Z98.84 HISTORY OF ROUX-EN-Y GASTRIC BYPASS: ICD-10-CM

## 2024-02-27 DIAGNOSIS — D50.9 IRON DEFICIENCY ANEMIA, UNSPECIFIED IRON DEFICIENCY ANEMIA TYPE: ICD-10-CM

## 2024-02-27 DIAGNOSIS — G25.0 TREMOR, ESSENTIAL: ICD-10-CM

## 2024-02-27 DIAGNOSIS — F43.21 ADJUSTMENT DISORDER WITH DEPRESSED MOOD: ICD-10-CM

## 2024-02-27 DIAGNOSIS — E61.1 IRON DEFICIENCY: ICD-10-CM

## 2024-02-27 DIAGNOSIS — Z12.31 ENCOUNTER FOR SCREENING MAMMOGRAM FOR MALIGNANT NEOPLASM OF BREAST: ICD-10-CM

## 2024-02-27 DIAGNOSIS — R63.4 WEIGHT LOSS: ICD-10-CM

## 2024-02-27 DIAGNOSIS — R19.00 ABDOMINAL WALL BULGE: Primary | ICD-10-CM

## 2024-02-27 DIAGNOSIS — E53.8 B12 DEFICIENCY: ICD-10-CM

## 2024-02-27 DIAGNOSIS — E66.01 SEVERE OBESITY (BMI 35.0-39.9) WITH COMORBIDITY: ICD-10-CM

## 2024-02-27 LAB
25(OH)D3+25(OH)D2 SERPL-MCNC: 67 NG/ML (ref 30–96)
ALBUMIN SERPL BCP-MCNC: 4.2 G/DL (ref 3.5–5.2)
ALP SERPL-CCNC: 46 U/L (ref 55–135)
ALT SERPL W/O P-5'-P-CCNC: 19 U/L (ref 10–44)
ANION GAP SERPL CALC-SCNC: 9 MMOL/L (ref 8–16)
AST SERPL-CCNC: 22 U/L (ref 10–40)
BASOPHILS # BLD AUTO: 0.04 K/UL (ref 0–0.2)
BASOPHILS NFR BLD: 0.5 % (ref 0–1.9)
BILIRUB SERPL-MCNC: 0.7 MG/DL (ref 0.1–1)
BUN SERPL-MCNC: 11 MG/DL (ref 8–23)
CALCIUM SERPL-MCNC: 9.7 MG/DL (ref 8.7–10.5)
CHLORIDE SERPL-SCNC: 98 MMOL/L (ref 95–110)
CHOLEST SERPL-MCNC: 208 MG/DL (ref 120–199)
CHOLEST/HDLC SERPL: 3.4 {RATIO} (ref 2–5)
CO2 SERPL-SCNC: 29 MMOL/L (ref 23–29)
CREAT SERPL-MCNC: 0.8 MG/DL (ref 0.5–1.4)
DIFFERENTIAL METHOD BLD: NORMAL
EOSINOPHIL # BLD AUTO: 0 K/UL (ref 0–0.5)
EOSINOPHIL NFR BLD: 0.5 % (ref 0–8)
ERYTHROCYTE [DISTWIDTH] IN BLOOD BY AUTOMATED COUNT: 12.6 % (ref 11.5–14.5)
EST. GFR  (NO RACE VARIABLE): >60 ML/MIN/1.73 M^2
ESTIMATED AVG GLUCOSE: 94 MG/DL (ref 68–131)
FERRITIN SERPL-MCNC: 135 NG/ML (ref 20–300)
GLUCOSE SERPL-MCNC: 91 MG/DL (ref 70–110)
HBA1C MFR BLD: 4.9 % (ref 4–5.6)
HCT VFR BLD AUTO: 43.7 % (ref 37–48.5)
HDLC SERPL-MCNC: 61 MG/DL (ref 40–75)
HDLC SERPL: 29.3 % (ref 20–50)
HGB BLD-MCNC: 14.2 G/DL (ref 12–16)
IMM GRANULOCYTES # BLD AUTO: 0.04 K/UL (ref 0–0.04)
IMM GRANULOCYTES NFR BLD AUTO: 0.5 % (ref 0–0.5)
IRON SERPL-MCNC: 100 UG/DL (ref 30–160)
LDLC SERPL CALC-MCNC: 125.6 MG/DL (ref 63–159)
LYMPHOCYTES # BLD AUTO: 3 K/UL (ref 1–4.8)
LYMPHOCYTES NFR BLD: 39.6 % (ref 18–48)
MCH RBC QN AUTO: 29.2 PG (ref 27–31)
MCHC RBC AUTO-ENTMCNC: 32.5 G/DL (ref 32–36)
MCV RBC AUTO: 90 FL (ref 82–98)
MONOCYTES # BLD AUTO: 0.5 K/UL (ref 0.3–1)
MONOCYTES NFR BLD: 6.1 % (ref 4–15)
NEUTROPHILS # BLD AUTO: 4.1 K/UL (ref 1.8–7.7)
NEUTROPHILS NFR BLD: 52.8 % (ref 38–73)
NONHDLC SERPL-MCNC: 147 MG/DL
NRBC BLD-RTO: 0 /100 WBC
PLATELET # BLD AUTO: 288 K/UL (ref 150–450)
PMV BLD AUTO: 10.3 FL (ref 9.2–12.9)
POTASSIUM SERPL-SCNC: 3.7 MMOL/L (ref 3.5–5.1)
PROT SERPL-MCNC: 6.9 G/DL (ref 6–8.4)
RBC # BLD AUTO: 4.86 M/UL (ref 4–5.4)
SATURATED IRON: 26 % (ref 20–50)
SODIUM SERPL-SCNC: 136 MMOL/L (ref 136–145)
TOTAL IRON BINDING CAPACITY: 389 UG/DL (ref 250–450)
TRANSFERRIN SERPL-MCNC: 263 MG/DL (ref 200–375)
TRIGL SERPL-MCNC: 107 MG/DL (ref 30–150)
TSH SERPL DL<=0.005 MIU/L-ACNC: 1.58 UIU/ML (ref 0.4–4)
VIT B12 SERPL-MCNC: 860 PG/ML (ref 210–950)
WBC # BLD AUTO: 7.68 K/UL (ref 3.9–12.7)

## 2024-02-27 PROCEDURE — 80061 LIPID PANEL: CPT | Performed by: INTERNAL MEDICINE

## 2024-02-27 PROCEDURE — 99999 PR PBB SHADOW E&M-EST. PATIENT-LVL IV: CPT | Mod: PBBFAC,,, | Performed by: INTERNAL MEDICINE

## 2024-02-27 PROCEDURE — 99214 OFFICE O/P EST MOD 30 MIN: CPT | Mod: PBBFAC | Performed by: INTERNAL MEDICINE

## 2024-02-27 PROCEDURE — 83540 ASSAY OF IRON: CPT | Performed by: INTERNAL MEDICINE

## 2024-02-27 PROCEDURE — 84425 ASSAY OF VITAMIN B-1: CPT | Performed by: INTERNAL MEDICINE

## 2024-02-27 PROCEDURE — 83036 HEMOGLOBIN GLYCOSYLATED A1C: CPT | Performed by: INTERNAL MEDICINE

## 2024-02-27 PROCEDURE — 84443 ASSAY THYROID STIM HORMONE: CPT | Performed by: INTERNAL MEDICINE

## 2024-02-27 PROCEDURE — 82306 VITAMIN D 25 HYDROXY: CPT | Performed by: INTERNAL MEDICINE

## 2024-02-27 PROCEDURE — 36415 COLL VENOUS BLD VENIPUNCTURE: CPT | Performed by: INTERNAL MEDICINE

## 2024-02-27 PROCEDURE — 84630 ASSAY OF ZINC: CPT | Performed by: INTERNAL MEDICINE

## 2024-02-27 PROCEDURE — 82728 ASSAY OF FERRITIN: CPT | Performed by: INTERNAL MEDICINE

## 2024-02-27 PROCEDURE — 85025 COMPLETE CBC W/AUTO DIFF WBC: CPT | Performed by: INTERNAL MEDICINE

## 2024-02-27 PROCEDURE — 82607 VITAMIN B-12: CPT | Performed by: INTERNAL MEDICINE

## 2024-02-27 PROCEDURE — 80053 COMPREHEN METABOLIC PANEL: CPT | Performed by: INTERNAL MEDICINE

## 2024-02-27 PROCEDURE — 99215 OFFICE O/P EST HI 40 MIN: CPT | Mod: S$PBB,,, | Performed by: INTERNAL MEDICINE

## 2024-02-27 NOTE — PROGRESS NOTES
Subjective:   Patient ID: Trupti Coronado is a 64 y.o. female  Chief complaint:   Chief Complaint   Patient presents with    Annual Exam       HPI  62F with hx of gastric surgery, b12 def, KALLIE, vit d def, essential tremor:    Obesity - resolved:   Taking 15mg dose of mounjaro   No SE   Hx of gastric bypass - she is concerned that has hernia that needs to be repaired     Noticed bulging at umbilicus - had hx of abdominoplasy and then abd hysterectomy   - small bulging sup to umbilicus and left abd    - intermittent abd pain but better after weight loss    - seen 8/2021 and deferred and ct abd not completed due to Kallie and then sx stable but still present   As per lcv note:  - if bears down she reports abd contents shift to right   - using Morning Complete pre and probiotic and helping to have bm  - intermittent abd pain - located in different places epigastric and right and at times on left upper abd   - pain achy and occurrence is varriable - 3 times per week to every 2 weeks   - feels like has to have a bm once pain starts   + bloatiing  Eating can occ help but often worsens sx   - pain can last 1h -2 hours   - prior abd surgeries - gastric surgery, hysterectomy   Interested in seeing:  Dr. Kanu Ramirez   - no reflux - will use protonix a couple of times per month   - using tums intermittently a couple of times per week but not bc of sx but bc enjoy tastes  No blood in stool   No hematemesis  No unexplained weight loss   No dysuria  Having bm     HTN:  Meli meds - hctz 12.5 and propranolol 60  Bp 120/70  LA BB has helped with tremor     Obesity bmi 35 now normal 25:  Would like to start wegovy   - no hx of pancreatitis or thyroid cancer     Adjustment disorder:   - doing well on Wellbutrin   More energy and feels back to self   More energy and motivated to exercise   Mood is good     Walking on treadmill for exercose less since travel - will resume     Hemoptysis:   Resolved   Seen by pulm and note reviewed   Ct chest  "completed at [facility] and no abnormalities in chest   Using ppi rarely - 1 every 2-3 weeks   Using less tums     B12 def: resolved off of suppl  Cont balanced diet   Prev:   Not on for > 1 year - did not get refills   Ran out of B12 inj and ran out - last was 10 months ago     Iron def:   Not taking oral iron other mvi   - resolved       Essential tremor:    - nancy ER propranolol at this time   - improved  Prev:  - affects ability to write and bothersome   - seen by neuro previously - discussed follow up to review tx options    GERD:  - stable   Sx intermittent - only taking ppi a couple of times per month when needed   At [facility]cv:   Ordered CT abd/pelvis last year after lcv but not completed for sx discussed at that time - never completed - sx improved   - Reports GI sx reported at [facility] improved - Covid pandemic interfered with those appt follow up     HM:  Rsv  Prevnar 20  Shingrix   Flu   Covid   Mmg   Dexa - due 9/2025 for repeat - prev wnl     Review of Systems    Objective:  Vitals:    02/27/24 1155   BP: 110/80   BP Location: Left arm   Patient Position: Sitting   BP Method: Large (Manual)   Pulse: 76   SpO2: 99%   Weight: 62 kg (136 lb 11 oz)   Height: 5' 2" (1.575 m)     Body mass index is 25 kg/m².    Physical Exam  Vitals reviewed.   Constitutional:       Appearance: Normal appearance. She is well-developed.   HENT:      Head: Normocephalic and atraumatic.      Right Ear: Tympanic membrane, ear canal and external ear normal.      Left Ear: Tympanic membrane, ear canal and external ear normal.      Nose: Nose normal. No congestion.      Mouth/Throat:      Mouth: Mucous membranes are moist.      Pharynx: Oropharynx is clear. No oropharyngeal exudate.   Eyes:      Extraocular Movements: Extraocular movements intact.      Conjunctiva/sclera: Conjunctivae normal.   Neck:      Thyroid: No thyromegaly.   Cardiovascular:      Rate and Rhythm: Normal rate and regular rhythm.      Pulses: Normal pulses.      Heart sounds: " Normal heart sounds.   Pulmonary:      Effort: Pulmonary effort is normal.      Breath sounds: Normal breath sounds.   Abdominal:      General: Bowel sounds are normal.      Palpations: Abdomen is soft.      Comments: Small abd bulge - ntnd   Musculoskeletal:         General: No swelling or tenderness.      Cervical back: Normal range of motion and neck supple.   Lymphadenopathy:      Cervical: No cervical adenopathy.   Skin:     General: Skin is warm and dry.      Capillary Refill: Capillary refill takes less than 2 seconds.      Nails: There is no clubbing.   Neurological:      General: No focal deficit present.      Mental Status: She is alert and oriented to person, place, and time. Mental status is at baseline.      Gait: Gait normal.   Psychiatric:         Mood and Affect: Mood normal.         Speech: Speech normal.         Behavior: Behavior normal.         Thought Content: Thought content normal.         Judgment: Judgment normal.         Assessment:  1. Abdominal wall bulge    2. Encounter for weight loss counseling    3. Severe obesity (BMI 35.0-39.9) with comorbidity    4. Vitamin D deficiency    5. Essential hypertension    6. B12 deficiency    7. Iron deficiency    8. Iron deficiency anemia, unspecified iron deficiency anemia type    9. History of Tatyana-en-Y gastric bypass    10. Adjustment disorder with depressed mood    11. Weight loss    12. IFG (impaired fasting glucose)    13. Encounter for screening mammogram for malignant neoplasm of breast    14. Tremor, essential        Plan:  Trupti was seen today for annual exam.    Diagnoses and all orders for this visit:    Abdominal wall bulge  -     US Abdomen Limited_Hernia; Future  Check abd us to eval for hernia   She will let me know if needs referral for gen surgeon that she would like to see outside of [facility]     Encounter for weight loss counseling  Stable on tirzepatide compounded from outside clinic     Severe obesity (BMI 35.0-39.9) with  comorbidity  As above   Improved   - cont diet and exercise  - increase intensity and duration of CV exercise to continue weight loss  - goal wt loss one pound per week  - portion control, healthy choices    Vitamin D deficiency  Stable   Cont suppl     Essential hypertension  -     TSH; Future  -     Lipid Panel; Future  Stable   Update labs   Cont meds     B12 deficiency  -     CBC Auto Differential; Future  -     Vitamin B12; Future    Iron deficiency  -     CBC Auto Differential; Future  -     FERRITIN; Future  -     IRON AND TIBC; Future    Iron deficiency anemia, unspecified iron deficiency anemia type    History of Tatyana-en-Y gastric bypass  -     Hemoglobin A1C; Future  -     CBC Auto Differential; Future  -     Comprehensive Metabolic Panel; Future  -     ZINC; Future  -     Vitamin D; Future  -     Vitamin B1; Future    Adjustment disorder with depressed mood  Stable   Cont med     Weight loss  -     TSH; Future  Intentional   As above     IFG (impaired fasting glucose)  -     Hemoglobin A1C; Future    Encounter for screening mammogram for malignant neoplasm of breast  -     Mammo Digital Screening Bilat w/ Donaldo; Future    Tremor, essential  Stable   Cont BB - f/u with neuro if sx progress    40 min spent in care of patient including chart review, history, orders, physical, orders and coordination of care    Health Maintenance   Topic Date Due    Shingles Vaccine (2 of 2) 08/30/2023    Mammogram  03/12/2025    DEXA Scan  09/26/2025    Colorectal Cancer Screening  11/29/2027    Lipid Panel  02/27/2029    TETANUS VACCINE  06/30/2033    Hepatitis C Screening  Completed

## 2024-02-29 ENCOUNTER — HOSPITAL ENCOUNTER (OUTPATIENT)
Dept: RADIOLOGY | Facility: OTHER | Age: 64
Discharge: HOME | End: 2024-02-29
Attending: INTERNAL MEDICINE
Payer: MEDICARE

## 2024-02-29 DIAGNOSIS — R19.00 ABDOMINAL WALL BULGE: ICD-10-CM

## 2024-02-29 PROCEDURE — 76705 ECHO EXAM OF ABDOMEN: CPT | Mod: TC

## 2024-02-29 PROCEDURE — 76705 ECHO EXAM OF ABDOMEN: CPT | Mod: 26,,, | Performed by: RADIOLOGY

## 2024-03-01 LAB — ZINC SERPL-MCNC: 85 UG/DL (ref 60–130)

## 2024-03-04 LAB — VIT B1 BLD-MCNC: 82 UG/L (ref 38–122)

## 2024-03-12 ENCOUNTER — HOSPITAL ENCOUNTER (OUTPATIENT)
Dept: RADIOLOGY | Facility: OTHER | Age: 64
Discharge: HOME | End: 2024-03-12
Attending: INTERNAL MEDICINE
Payer: MEDICARE

## 2024-03-12 DIAGNOSIS — Z12.31 ENCOUNTER FOR SCREENING MAMMOGRAM FOR MALIGNANT NEOPLASM OF BREAST: ICD-10-CM

## 2024-03-12 PROCEDURE — 77067 SCR MAMMO BI INCL CAD: CPT | Mod: 26,,, | Performed by: RADIOLOGY

## 2024-03-12 PROCEDURE — 77063 BREAST TOMOSYNTHESIS BI: CPT | Mod: 26,,, | Performed by: RADIOLOGY

## 2024-03-12 PROCEDURE — 77067 SCR MAMMO BI INCL CAD: CPT | Mod: TC

## 2024-04-09 DIAGNOSIS — I10 HYPERTENSION, BENIGN: ICD-10-CM

## 2024-04-09 DIAGNOSIS — R25.1 TREMOR: ICD-10-CM

## 2024-04-10 RX ORDER — PROPRANOLOL HYDROCHLORIDE 60 MG/1
60 CAPSULE, EXTENDED RELEASE ORAL
Qty: 90 CAPSULE | Refills: 3 | Status: SHIPPED | OUTPATIENT
Start: 2024-04-10

## 2024-04-10 NOTE — TELEPHONE ENCOUNTER
Refill Decision Note   Trupti Cliff  is requesting a refill authorization.  Brief Assessment and Rationale for Refill:  Approve     Medication Therapy Plan:         Comments:     Note composed:8:43 AM 04/10/2024

## 2024-04-10 NOTE — TELEPHONE ENCOUNTER
No care due was identified.  [facility] Embedded Care Due Messages. Reference number: [id number].   4/09/2024 7:58:59 PM CDT

## 2024-06-22 DIAGNOSIS — I10 HYPERTENSION, BENIGN: ICD-10-CM

## 2024-06-22 RX ORDER — HYDROCHLOROTHIAZIDE 12.5 MG/1
12.5 TABLET ORAL DAILY
Qty: 90 TABLET | Refills: 2 | Status: SHIPPED | OUTPATIENT
Start: 2024-06-22

## 2024-06-22 NOTE — TELEPHONE ENCOUNTER
Refill Decision Note   Trupti Cliff  is requesting a refill authorization.  Brief Assessment and Rationale for Refill:  Approve     Medication Therapy Plan:         Comments:     Note composed:6:19 PM 06/22/2024

## 2024-06-22 NOTE — TELEPHONE ENCOUNTER
No care due was identified.  [facility] Embedded Care Due Messages. Reference number: [id number].   6/22/2024 11:21:49 AM CDT

## 2024-07-16 ENCOUNTER — TELEPHONE (OUTPATIENT)
Dept: INTERNAL MEDICINE | Facility: CLINIC | Age: 64
End: 2024-07-16
Payer: MEDICARE

## 2024-07-16 NOTE — TELEPHONE ENCOUNTER
----- Message from Baldo Arrington sent at 7/15/2024  3:48 PM CDT -----  Contact: Katherine  Type:  Patient Call          Who Called: patient         Does the patient know what this is regarding?: Requesting a call back about having a appt scheduled ; pt said that she was in ICU at [facility] recently ; pt said that she have other health issues that need to be reviewed  by the Dr from a really bad fall ; ;please advise           Would the patient rather a call back or a response via [facility]?call           Best Call Back Number: [phone]           Additional Information: Pt said that her matter is urgent

## 2024-07-17 ENCOUNTER — HOSPITAL ENCOUNTER (OUTPATIENT)
Dept: RADIOLOGY | Facility: OTHER | Age: 64
Discharge: HOME | End: 2024-07-17
Attending: INTERNAL MEDICINE
Payer: MEDICARE

## 2024-07-17 ENCOUNTER — OFFICE VISIT (OUTPATIENT)
Dept: INTERNAL MEDICINE | Facility: CLINIC | Age: 64
End: 2024-07-17
Attending: INTERNAL MEDICINE
Payer: MEDICARE

## 2024-07-17 VITALS
BODY MASS INDEX: 22.36 KG/M2 | HEIGHT: 62 IN | HEART RATE: 79 BPM | DIASTOLIC BLOOD PRESSURE: 78 MMHG | WEIGHT: 121.5 LBS | OXYGEN SATURATION: 98 % | SYSTOLIC BLOOD PRESSURE: 120 MMHG

## 2024-07-17 DIAGNOSIS — S22.41XS CLOSED FRACTURE OF MULTIPLE RIBS OF RIGHT SIDE, SEQUELA: ICD-10-CM

## 2024-07-17 DIAGNOSIS — M62.838 MUSCLE SPASM: ICD-10-CM

## 2024-07-17 DIAGNOSIS — N95.9 MENOPAUSAL PROBLEM: ICD-10-CM

## 2024-07-17 DIAGNOSIS — R25.1 TREMOR: ICD-10-CM

## 2024-07-17 DIAGNOSIS — G89.29 OTHER CHRONIC BACK PAIN: ICD-10-CM

## 2024-07-17 DIAGNOSIS — J93.9 PNEUMOTHORAX ON RIGHT: ICD-10-CM

## 2024-07-17 DIAGNOSIS — M54.9 OTHER CHRONIC BACK PAIN: ICD-10-CM

## 2024-07-17 DIAGNOSIS — G93.0 BRAIN CYST: ICD-10-CM

## 2024-07-17 DIAGNOSIS — Z91.81 STATUS POST FALL: ICD-10-CM

## 2024-07-17 DIAGNOSIS — L98.9 SKIN LESION: ICD-10-CM

## 2024-07-17 DIAGNOSIS — J93.9 PNEUMOTHORAX ON RIGHT: Primary | ICD-10-CM

## 2024-07-17 PROCEDURE — 71046 X-RAY EXAM CHEST 2 VIEWS: CPT | Mod: 26,,, | Performed by: RADIOLOGY

## 2024-07-17 PROCEDURE — 72080 X-RAY EXAM THORACOLMB 2/> VW: CPT | Mod: TC

## 2024-07-17 PROCEDURE — 99215 OFFICE O/P EST HI 40 MIN: CPT | Mod: S$PBB,,, | Performed by: INTERNAL MEDICINE

## 2024-07-17 PROCEDURE — 72080 X-RAY EXAM THORACOLMB 2/> VW: CPT | Mod: 26,,, | Performed by: RADIOLOGY

## 2024-07-17 PROCEDURE — 99999 PR PBB SHADOW E&M-EST. PATIENT-LVL V: CPT | Mod: PBBFAC,,, | Performed by: INTERNAL MEDICINE

## 2024-07-17 PROCEDURE — 71046 X-RAY EXAM CHEST 2 VIEWS: CPT | Mod: TC,FY

## 2024-07-17 PROCEDURE — 99215 OFFICE O/P EST HI 40 MIN: CPT | Mod: PBBFAC,25 | Performed by: INTERNAL MEDICINE

## 2024-07-17 RX ORDER — METHOCARBAMOL 750 MG/1
750 TABLET, FILM COATED ORAL 3 TIMES DAILY PRN
Qty: 30 TABLET | Refills: 0 | Status: SHIPPED | OUTPATIENT
Start: 2024-07-17 | End: 2024-07-27

## 2024-07-17 NOTE — PROGRESS NOTES
Subjective:   Patient ID: Trupti Coronado is a 64 y.o. female  Chief complaint:   Chief Complaint   Patient presents with    Hospital Follow Up    Back Pain     Having a lot of back pain since fall/hospital stay     HPI  62F with hx of gastric surgery, b12 def, CHARIS, vit d def, essential tremor:    Here for hospital discharge follow up   Admitted 5/31-6/6/2024  Since then saw nsx 7/5/2024 - rec visual field testing July 26            Physician Discharge Summary   Admit date: 5/31/2024  Discharge date: 06/06/24  Admission Diagnoses: Brain cyst [G93.0]  Hemopneumothorax on right [J94.2]  Hemothorax on right [J94.2]  Multiple rib fractures [S22.49XA]  Discharge Diagnoses:  Brain cyst [G93.0]  Hemopneumothorax on right [J94.2]  Hemothorax on right [J94.2]  Multiple rib fractures [S22.49XA]      Hospital Course: Trupti Coronado is a 64 y.o. female with a PMH of essential tremor and gastric bypass who presents following a ground level fall over a chair with injuries including R 6-9th rib fractures and right hemopneumothorax s/p chest tube placement 6/1. Patient was initially admitted to the ICU for respiratory monitoring before being stepped down to the floors. Patient remained hemodynamically stable after chest tube removal with no significant change in PTX on imaging. Patient deemed ok for discharge with follow up with NSGY and Trauma with home PT and DME.     Cxr: 6/6/2024:   FINDINGS:   Cardiomediastinal silhouette is within normal limits.   The lungs are symmetrically inflated. No pleural effusion. Small right-sided pneumothorax is unchanged. Bibasilar opacities are stable in appearance.   Displaced right-sided rib fractures are grossly unchanged.     MRI brain 6/1/2024:  Impression  1.   4.9 cm thin walled CSF isointense/cystic intra-axial left parietal mass without surrounding edema. There is no associated enhancement, FLAIR signal abnormality, or mineralization. Findings suggest neuroglial cyst. Although if there is  concern for infection, ie hydatid cyst could be considered.  Purely cystic, nonenhancing glioneuronal tumor is less likely. Follow-up is recommended within 3 months or as indicated by clinical symptoms.  2.   No acute infarction or intracranial enhancement.    Obesity - resolved:   Taking tirz through chronos and tolerating   Maintaining weight   No SE   Hx of gastric bypass - she is concerned that has hernia that needs to be repaired     HTN:  Meli meds - hctz 12.5 and propranolol 60  Bp 120/70  LA BB has helped with tremor     ### not addressed today #####    Noticed bulging at umbilicus - had hx of abdominoplasy and then abd hysterectomy   - small bulging sup to umbilicus and left abd    - intermittent abd pain but better after weight loss    - seen 8/2021 and deferred and ct abd not completed due to Kallie and then sx stable but still present   As per lcv note:  - if bears down she reports abd contents shift to right   - using Morning Complete pre and probiotic and helping to have bm  - intermittent abd pain - located in different places epigastric and right and at times on left upper abd   - pain achy and occurrence is varriable - 3 times per week to every 2 weeks   - feels like has to have a bm once pain starts   + bloatiing  Eating can occ help but often worsens sx   - pain can last 1h -2 hours   - prior abd surgeries - gastric surgery, hysterectomy   Interested in seeing:  Dr. Kanu Ramirez   - no reflux - will use protonix a couple of times per month   - using tums intermittently a couple of times per week but not bc of sx but bc enjoy tastes  No blood in stool   No hematemesis  No unexplained weight loss   No dysuria  Having bm     Adjustment disorder:   - doing well on Wellbutrin   More energy and feels back to self   More energy and motivated to exercise   Mood is good     B12 def: resolved off of suppl  Cont balanced diet   Prev:   Not on for > 1 year - did not get refills   Ran out of B12 inj and ran out  "- last was 10 months ago     Iron def:   Not taking oral iron other mvi   - resolved       Essential tremor:    - nancy ER propranolol at this time   - improved  Prev:  - affects ability to write and bothersome   - seen by neuro previously - discussed follow up to review tx options    GERD:  - stable   Sx intermittent - only taking ppi a couple of times per month when needed   At v:   Ordered CT abd/pelvis last year after lcv but not completed for sx discussed at that time - never completed - sx improved   - Reports GI sx reported at [facility] improved - Covid pandemic interfered with those appt follow up     ##########    HM:  Prevnar 20  Flu in fall   Covid   Dexa - due 9/2025 for repeat - prev wnl     Review of Systems    Objective:  Vitals:    07/17/24 0843   BP: 120/78   BP Location: Right arm   Patient Position: Sitting   Pulse: 79   SpO2: 98%   Weight: 55.1 kg (121 lb 7.6 oz)   Height: 5' 2" (1.575 m)       Body mass index is 22.22 kg/m².    Physical Exam  Vitals reviewed.   Constitutional:       Appearance: Normal appearance. She is well-developed.   HENT:      Head: Normocephalic and atraumatic.   Eyes:      Extraocular Movements: Extraocular movements intact.      Conjunctiva/sclera: Conjunctivae normal.   Cardiovascular:      Rate and Rhythm: Normal rate and regular rhythm.      Pulses: Normal pulses.      Heart sounds: Normal heart sounds.   Pulmonary:      Effort: Pulmonary effort is normal.      Breath sounds: Normal breath sounds.      Comments: + click at right lower chest wall with extending right arm   No crepitus   Abdominal:      General: Bowel sounds are normal.      Palpations: Abdomen is soft.   Musculoskeletal:         General: No swelling or tenderness.      Cervical back: Normal range of motion and neck supple.   Skin:     General: Skin is warm and dry.      Capillary Refill: Capillary refill takes less than 2 seconds.      Nails: There is no clubbing.   Neurological:      General: No focal " deficit present.      Mental Status: She is alert and oriented to person, place, and time.      Gait: Gait normal.   Psychiatric:         Speech: Speech normal.         Behavior: Behavior normal.         Thought Content: Thought content normal.       Assessment:  1. Pneumothorax on right    2. Closed fracture of multiple ribs of right side, sequela    3. Brain cyst    4. Other chronic back pain    5. Status post fall    6. Muscle spasm    7. Tremor    8. Skin lesion    9. Menopausal problem      Plan:    Pneumothorax on right  -     X-Ray Chest PA And Lateral; Future; Expected date: 07/17/2024  Update cxr to doc resolution     Closed fracture of multiple ribs of right side, sequela  -     X-Ray Chest PA And Lateral; Future; Expected date: 07/17/2024  -     Ambulatory referral/consult to Thoracic Surgery; Future; Expected date: 07/24/2024    Brain cyst  -     Ambulatory referral/consult to Ophthalmology; Future; Expected date: 07/24/2024  -     Ambulatory referral/consult to Neurosurgery; Future; Expected date: 07/24/2024  Giovany by outside neuro specialist  - needs field testing - referral signed     Other chronic back pain  -     X-Ray Thoracolumbar Spine AP Lateral; Future; Expected date: 07/17/2024    Status post fall  -     X-Ray Thoracolumbar Spine AP Lateral; Future; Expected date: 07/17/2024    Muscle spasm  -     methocarbamoL (ROBAXIN) 750 MG Tab; Take 1 tablet (750 mg total) by mouth 3 (three) times daily as needed (muscle relaxer).  Dispense: 30 tablet; Refill: 0    Tremor  -     Ambulatory referral/consult to Neurology; Future; Expected date: 07/24/2024    Skin lesion  -     Ambulatory referral/consult to Dermatology; Future; Expected date: 07/24/2024    Menopausal problem  -     DXA Bone Density Axial Skeleton 1 or more sites; Future; Expected date: 09/27/2024    Today:   Dexa late sept  F/u with specialists   Update imaging     45  min spent in care of patient including chart review, history, orders,  physical, orders and coordination of care    Health Maintenance   Topic Date Due    Mammogram  03/12/2025    DEXA Scan  09/26/2025    Colorectal Cancer Screening  11/29/2027    Lipid Panel  02/27/2029    TETANUS VACCINE  06/30/2033    Hepatitis C Screening  Completed    Shingles Vaccine  Completed

## 2024-07-18 ENCOUNTER — TELEPHONE (OUTPATIENT)
Dept: OPHTHALMOLOGY | Facility: CLINIC | Age: 64
End: 2024-07-18
Payer: MEDICARE

## 2024-07-18 ENCOUNTER — TELEPHONE (OUTPATIENT)
Dept: CARDIOTHORACIC SURGERY | Facility: CLINIC | Age: 64
End: 2024-07-18
Payer: MEDICARE

## 2024-07-18 DIAGNOSIS — J93.9 PNEUMOTHORAX, RIGHT: ICD-10-CM

## 2024-07-18 DIAGNOSIS — S22.41XS CLOSED FRACTURE OF MULTIPLE RIBS OF RIGHT SIDE, SEQUELA: Primary | ICD-10-CM

## 2024-07-18 NOTE — TELEPHONE ENCOUNTER
Called and left message for patient to call back to schedule an appointment per referral. Patient will need a ct chest with 3D recon prior to her visit.

## 2024-07-19 ENCOUNTER — TELEPHONE (OUTPATIENT)
Dept: OPHTHALMOLOGY | Facility: CLINIC | Age: 64
End: 2024-07-19
Payer: MEDICARE

## 2024-07-21 PROBLEM — S22.41XA MULTIPLE CLOSED FRACTURES OF RIBS OF RIGHT SIDE: Status: ACTIVE | Noted: 2024-07-21

## 2024-07-21 PROBLEM — M62.838 MUSCLE SPASM: Status: ACTIVE | Noted: 2024-07-21

## 2024-07-21 PROBLEM — G93.0 BRAIN CYST: Status: ACTIVE | Noted: 2024-07-21

## 2024-07-22 ENCOUNTER — OFFICE VISIT (OUTPATIENT)
Dept: CARDIOTHORACIC SURGERY | Facility: CLINIC | Age: 64
End: 2024-07-22
Payer: MEDICARE

## 2024-07-22 ENCOUNTER — HOSPITAL ENCOUNTER (OUTPATIENT)
Dept: RADIOLOGY | Facility: HOSPITAL | Age: 64
Discharge: HOME | End: 2024-07-22
Attending: PHYSICIAN ASSISTANT
Payer: MEDICARE

## 2024-07-22 VITALS
SYSTOLIC BLOOD PRESSURE: 102 MMHG | BODY MASS INDEX: 22.36 KG/M2 | HEIGHT: 62 IN | WEIGHT: 121.5 LBS | DIASTOLIC BLOOD PRESSURE: 74 MMHG | HEART RATE: 74 BPM | OXYGEN SATURATION: 99 %

## 2024-07-22 DIAGNOSIS — J93.9 PNEUMOTHORAX, RIGHT: ICD-10-CM

## 2024-07-22 DIAGNOSIS — S22.41XS CLOSED FRACTURE OF MULTIPLE RIBS OF RIGHT SIDE, SEQUELA: ICD-10-CM

## 2024-07-22 PROCEDURE — 99999 PR PBB SHADOW E&M-EST. PATIENT-LVL IV: CPT | Mod: PBBFAC,,, | Performed by: STUDENT IN AN ORGANIZED HEALTH CARE EDUCATION/TRAINING PROGRAM

## 2024-07-22 PROCEDURE — 99214 OFFICE O/P EST MOD 30 MIN: CPT | Mod: PBBFAC,25 | Performed by: STUDENT IN AN ORGANIZED HEALTH CARE EDUCATION/TRAINING PROGRAM

## 2024-07-22 PROCEDURE — 76377 3D RENDER W/INTRP POSTPROCES: CPT | Mod: TC

## 2024-07-22 PROCEDURE — 71250 CT THORAX DX C-: CPT | Mod: TC

## 2024-07-22 RX ORDER — CELECOXIB 200 MG/1
200 CAPSULE ORAL 2 TIMES DAILY
Qty: 60 CAPSULE | Refills: 2 | Status: SHIPPED | OUTPATIENT
Start: 2024-07-22

## 2024-07-22 NOTE — PROGRESS NOTES
[facility] - Thoracic Surgery   History & Physical    Patient Name: Trupti Coronado  MRN: [id number]  Attending Physician: Price Giron MD  Primary Care Provider: Neena Barrera MD    Subjective:     Chief Complaint/Reason for Admission: Rib fractures    History of Present Illness:    Mrs Coronado is a 64 y.o. female with a PMH of essential tremor and gastric bypass who presents following a ground level fall over a chair with injuries including R 6-9th rib fractures and right hemopneumothorax s/p chest tube placement 6/1. Patient was initially admitted to the ICU for respiratory monitoring but was discharged following chest tube removal with no oxygen requirements. Today, approximately 6 weeks post injury she reports minimal pain at rest but difficulty with sleeping, mostly well controlled with Oxycodone, muscle relaxant and motrin. She reports 0/10 pain at rest and 3/10 at sleep. She denies any shortness of breath. She denies dyspnea on exertion. She does report pain posteriorly which corresponds to her posterior 8&9 rib fractures.        Current Outpatient Medications on File Prior to Visit   Medication Sig    BIOTIN ORAL Take by mouth.    buPROPion (WELLBUTRIN XL) 150 MG TB24 tablet TAKE ONE TABLET BY MOUTH ONCE DAILY    calcium citrate (CALCITRATE) 200 mg (950 mg) tablet Take 1 tablet by mouth once daily.    cholecalciferol, vitamin D3, (VITAMIN D3) 2,000 unit Cap Take 1 capsule (2,000 Units total) by mouth once daily.    hydroCHLOROthiazide (HYDRODIURIL) 12.5 MG Tab Take 1 tablet (12.5 mg total) by mouth once daily.    methocarbamoL (ROBAXIN) 750 MG Tab Take 1 tablet (750 mg total) by mouth 3 (three) times daily as needed (muscle relaxer).    multivitamin capsule Take 1 capsule by mouth once daily.    omega-3 fatty acids/fish oil (FISH OIL-OMEGA-3 FATTY ACIDS) 300-1,000 mg capsule Take by mouth once daily.    propranoloL (INDERAL LA) 60 MG 24 hr capsule TAKE ONE CAPSULE BY MOUTH ONCE DAILY    syringe,  disposable, 3 mL Syrg 1 each by Misc.(Non-Drug; Combo Route) route every 30 days. 22 gauge    TURMERIC ORAL Take by mouth.    pantoprazole (PROTONIX) 40 MG tablet Take 1 tablet (40 mg total) by mouth once daily. (Patient not taking: Reported on 2024)     No current facility-administered medications on file prior to visit.       Review of patient's allergies indicates:   Allergen Reactions    Levofloxacin Rash and Hives       Past Medical History:   Diagnosis Date    History of abnormal cervical Pap smear     History of other genital system and obstetric disorders 10/29/2015 1:13:44 PM    [facility] Historical - Quick Add: History of abnormal pap-No Additional Notes    History of other genital system and obstetric disorders 10/29/2015 1:13:44 PM    [facility] Historical - Quick Add: History of abnormal pap-No Additional Notes    Weight loss      Past Surgical History:   Procedure Laterality Date    ABDOMINOPLASTY      BREAST BIOPSY Left 2019    BREAST RECONSTRUCTION      lift - no implants     SECTION  1999    core needle biopsy of breast with u/s guidance      COSMETIC SURGERY      FACIAL COSMETIC SURGERY      GASTRIC BYPASS      HYSTERECTOMY      LIPOSUCTION      TOTAL ABDOMINAL HYSTERECTOMY W/ BILATERAL SALPINGOOPHORECTOMY      TOTAL REDUCTION MAMMOPLASTY Bilateral      Family History       Problem Relation (Age of Onset)    Arthritis Mother    Diabetes Mother, Sister    Heart disease Mother, Father, Brother    Hyperlipidemia Mother, Father    Hypertension Mother, Father, Sister    Macular degeneration Mother    No Known Problems Daughter, Daughter, Brother    Other Sister, Sister    Peripheral vascular disease Father    Stroke Father          Tobacco Use    Smoking status: Never    Smokeless tobacco: Never   Substance and Sexual Activity    Alcohol use: Yes     Comment: rare    Drug use: No    Sexual activity: Never     Partners: Male     Comment:       Review of Systems    Constitutional: Negative.    Respiratory: Negative.     Cardiovascular: Negative.    Gastrointestinal: Negative.    Genitourinary: Negative.    Musculoskeletal:  Positive for back pain.   Neurological: Negative.    Hematological: Negative.    Psychiatric/Behavioral: Negative.       Objective:     Vital Signs (Most Recent):  Pulse: 74 (07/22/24 0903)  BP: 102/74 (07/22/24 0903)  SpO2: 99 % (07/22/24 0903) Vital Signs (24h Range):  [unfilled]     Weight: 55.1 kg (121 lb 7.6 oz)  Body mass index is 22.22 kg/m².    Physical Exam  Constitutional:       Appearance: Normal appearance.   HENT:      Head: Normocephalic and atraumatic.   Eyes:      Extraocular Movements: Extraocular movements intact.      Pupils: Pupils are equal, round, and reactive to light.   Cardiovascular:      Rate and Rhythm: Normal rate and regular rhythm.   Pulmonary:      Effort: Pulmonary effort is normal. No respiratory distress.      Comments: There is no chest wall asymmetry. No flail movements. Mild tenderness to palpation of the posterior chest.. Prominent xyphoid, likely secondary to recent weight loss     Abdominal:      General: Abdomen is flat.      Palpations: Abdomen is soft.   Skin:     General: Skin is warm and dry.   Neurological:      General: No focal deficit present.      Mental Status: She is alert and oriented to person, place, and time.   Psychiatric:         Mood and Affect: Mood normal.       Significant Diagnostics:  CT CHEST 7/22  Impression:     1. There are multiple fractures of right ribs 6-9.  2. There is no evidence of new or expanding pneumothorax.  3. There is associated airspace opacity and atelectasis along the costal margin of the right lower and middle lobes..  4. Pulmonary trunk is enlarged and measures 3.2 cm may represent pulmonary arterial hypertension.  Clinical correlation is needed.  5. Hyperattenuated focal lesion measuring 14 x 9 mm in the left renal midpole (hyperdense cyst?).  If there is any clinical  concern ultrasound of the kidneys is recommended for further evaluation.  6. Additional findings.  Please see the above discussion.       Assessment/Plan:     64F with multiple rib fractures. There is no respiratory compromise. She does still report pain but it is improving.     - Ordered Celebrex to help with chronic pain  - Pain management referral placed  - F/u in 6 weeks (~3 months from date of injur) to reassess    Price Giron M.D.  [phone] (direct)  Thoracic Surgery   [place]ian[place] [place]

## 2024-07-26 DIAGNOSIS — S22.41XS CLOSED FRACTURE OF MULTIPLE RIBS OF RIGHT SIDE, SEQUELA: Primary | ICD-10-CM

## 2024-07-31 ENCOUNTER — PATIENT MESSAGE (OUTPATIENT)
Dept: INTERNAL MEDICINE | Facility: CLINIC | Age: 64
End: 2024-07-31
Payer: MEDICARE

## 2024-08-06 ENCOUNTER — TELEPHONE (OUTPATIENT)
Dept: ADMINISTRATIVE | Facility: OTHER | Age: 64
End: 2024-08-06
Payer: MEDICARE

## 2024-08-07 ENCOUNTER — OFFICE VISIT (OUTPATIENT)
Dept: PAIN MEDICINE | Facility: CLINIC | Age: 64
End: 2024-08-07
Attending: ANESTHESIOLOGY
Payer: MEDICARE

## 2024-08-07 VITALS
TEMPERATURE: 98 F | BODY MASS INDEX: 22.42 KG/M2 | DIASTOLIC BLOOD PRESSURE: 76 MMHG | HEART RATE: 63 BPM | WEIGHT: 122.56 LBS | SYSTOLIC BLOOD PRESSURE: 109 MMHG

## 2024-08-07 DIAGNOSIS — S22.41XS CLOSED FRACTURE OF MULTIPLE RIBS OF RIGHT SIDE, SEQUELA: ICD-10-CM

## 2024-08-07 DIAGNOSIS — M81.0 OSTEOPOROSIS, UNSPECIFIED OSTEOPOROSIS TYPE, UNSPECIFIED PATHOLOGICAL FRACTURE PRESENCE: Primary | ICD-10-CM

## 2024-08-07 PROCEDURE — 99203 OFFICE O/P NEW LOW 30 MIN: CPT | Mod: S$PBB,GC,, | Performed by: ANESTHESIOLOGY

## 2024-08-07 PROCEDURE — 99999 PR PBB SHADOW E&M-EST. PATIENT-LVL IV: CPT | Mod: PBBFAC,,, | Performed by: ANESTHESIOLOGY

## 2024-08-07 PROCEDURE — 99214 OFFICE O/P EST MOD 30 MIN: CPT | Mod: PBBFAC | Performed by: ANESTHESIOLOGY

## 2024-08-13 DIAGNOSIS — M62.838 MUSCLE SPASM: ICD-10-CM

## 2024-08-13 RX ORDER — METHOCARBAMOL 750 MG/1
750 TABLET, FILM COATED ORAL 3 TIMES DAILY PRN
Qty: 45 TABLET | Refills: 3 | Status: SHIPPED | OUTPATIENT
Start: 2024-08-13 | End: 2024-08-23

## 2024-08-13 NOTE — TELEPHONE ENCOUNTER
Patient is requesting a refill on Robaxin. Dr. Guardado recommended the patient continue Robaxin but did not give her a refill. Would you like to refill this medication?

## 2024-08-13 NOTE — TELEPHONE ENCOUNTER
No care due was identified.  [facility] Embedded Care Due Messages. Reference number: [id number].   8/13/2024 4:44:28 PM CDT

## 2024-08-15 ENCOUNTER — OFFICE VISIT (OUTPATIENT)
Dept: NEUROSURGERY | Facility: CLINIC | Age: 64
End: 2024-08-15
Payer: MEDICARE

## 2024-08-15 VITALS
DIASTOLIC BLOOD PRESSURE: 73 MMHG | SYSTOLIC BLOOD PRESSURE: 105 MMHG | HEART RATE: 74 BPM | WEIGHT: 119 LBS | HEIGHT: 62 IN | BODY MASS INDEX: 21.9 KG/M2

## 2024-08-15 DIAGNOSIS — G93.0 BRAIN CYST: ICD-10-CM

## 2024-08-15 PROCEDURE — 99214 OFFICE O/P EST MOD 30 MIN: CPT | Mod: PBBFAC | Performed by: NEUROLOGICAL SURGERY

## 2024-08-15 PROCEDURE — 99204 OFFICE O/P NEW MOD 45 MIN: CPT | Mod: S$PBB,,, | Performed by: NEUROLOGICAL SURGERY

## 2024-08-15 PROCEDURE — 99999 PR PBB SHADOW E&M-EST. PATIENT-LVL IV: CPT | Mod: PBBFAC,,, | Performed by: NEUROLOGICAL SURGERY

## 2024-08-15 NOTE — PROGRESS NOTES
Neurosurgery  History & Physical    SUBJECTIVE:     Chief Complaint:  Brain cyst.    History of Present Illness:  Ms. Coronado is a 64-year-old female who was referred to me by Dr. Neena Barrera.  Her past medical history is significant for hypertension, peripheral vascular disease, anemia, bariatric surgery, and Plastic surgery.  She also has a history of essential tremor.  She had a ground level fall over a chair in June of 2024.  Injuries from the small included rib fractures and a right hemopneumothorax status post chest tube placement.  Due to the trauma, head imaging was done at that time which showed an incidental finding of a brain cyst.  Given this, she was referred for neurosurgical follow-up.  Currently, she denies headaches, blurry vision, double vision, numbness, tingling, weakness, or seizure-like activity.  She was asymptomatic.  She does have chronic low back pain which is worse when she was lying down.    Review of patient's allergies indicates:   Allergen Reactions    Levofloxacin Rash and Hives       Current Outpatient Medications   Medication Sig Dispense Refill    BIOTIN ORAL Take by mouth.      buPROPion (WELLBUTRIN XL) 150 MG TB24 tablet TAKE ONE TABLET BY MOUTH ONCE DAILY 90 tablet 3    calcium citrate (CALCITRATE) 200 mg (950 mg) tablet Take 1 tablet by mouth once daily.      celecoxib (CELEBREX) 200 MG capsule Take 1 capsule (200 mg total) by mouth 2 (two) times daily. 60 capsule 2    cholecalciferol, vitamin D3, (VITAMIN D3) 2,000 unit Cap Take 1 capsule (2,000 Units total) by mouth once daily.      hydroCHLOROthiazide (HYDRODIURIL) 12.5 MG Tab Take 1 tablet (12.5 mg total) by mouth once daily. 90 tablet 2    methocarbamoL (ROBAXIN) 750 MG Tab Take 1 tablet (750 mg total) by mouth 3 (three) times daily as needed (muscle relaxer). 45 tablet 3    multivitamin capsule Take 1 capsule by mouth once daily.      omega-3 fatty acids/fish oil (FISH OIL-OMEGA-3 FATTY ACIDS) 300-1,000 mg capsule  Take by mouth once daily.      pantoprazole (PROTONIX) 40 MG tablet Take 1 tablet (40 mg total) by mouth once daily. (Patient not taking: Reported on 2024) 90 tablet 0    propranoloL (INDERAL LA) 60 MG 24 hr capsule TAKE ONE CAPSULE BY MOUTH ONCE DAILY 90 capsule 3    syringe, disposable, 3 mL Syrg 1 each by Misc.(Non-Drug; Combo Route) route every 30 days. 22 gauge 25 each 3    TURMERIC ORAL Take by mouth.       No current facility-administered medications for this visit.       Past Medical History:   Diagnosis Date    History of abnormal cervical Pap smear     History of other genital system and obstetric disorders 10/29/2015 1:13:44 PM    [facility] Historical - Quick Add: History of abnormal pap-No Additional Notes    History of other genital system and obstetric disorders 10/29/2015 1:13:44 PM    [facility] Historical - Quick Add: History of abnormal pap-No Additional Notes    Weight loss      Past Surgical History:   Procedure Laterality Date    ABDOMINOPLASTY      BREAST BIOPSY Left 2019    BREAST RECONSTRUCTION      lift - no implants     SECTION  1999    core needle biopsy of breast with u/s guidance      COSMETIC SURGERY      FACIAL COSMETIC SURGERY      GASTRIC BYPASS      HYSTERECTOMY      LIPOSUCTION      TOTAL ABDOMINAL HYSTERECTOMY W/ BILATERAL SALPINGOOPHORECTOMY      TOTAL REDUCTION MAMMOPLASTY Bilateral      Family History       Problem Relation (Age of Onset)    Arthritis Mother    Diabetes Mother, Sister    Heart disease Mother, Father, Brother    Hyperlipidemia Mother, Father    Hypertension Mother, Father, Sister    Macular degeneration Mother    No Known Problems Daughter, Daughter, Brother    Other Sister, Sister    Peripheral vascular disease Father    Stroke Father          Social History     Socioeconomic History    Marital status:    Occupational History    Occupation: nurse   Tobacco Use    Smoking status: Never    Smokeless tobacco: Never   Substance and  Sexual Activity    Alcohol use: Yes     Comment: rare    Drug use: No    Sexual activity: Never     Partners: Male     Comment:     Social History Narrative    Plastic surgery nurse    From [place]    Moved back to [place] 2014 and then moved to [facility] July 2017     Social Determinants of Health     Food Insecurity: No Food Insecurity (6/1/2024)    Received from [facility]    Hunger Vital Sign     Worried About Running Out of Food in the Last Year: Never true     Ran Out of Food in the Last Year: Never true   Transportation Needs: No Transportation Needs (6/1/2024)    Received from [facility]    PRAPARE - Transportation     Lack of Transportation (Medical): No     Lack of Transportation (Non-Medical): No       Review of Systems   Constitutional:  Negative for activity change, diaphoresis, fatigue, fever and unexpected weight change.   HENT:  Negative for hearing loss, nosebleeds, tinnitus and trouble swallowing.    Eyes:  Negative for visual disturbance.   Respiratory:  Negative for cough, shortness of breath and wheezing.    Cardiovascular:  Negative for chest pain and palpitations.   Gastrointestinal:  Negative for abdominal distention, abdominal pain, blood in stool, constipation, diarrhea, nausea and vomiting.   Endocrine: Negative for cold intolerance and heat intolerance.   Genitourinary:  Negative for difficulty urinating, dysuria, frequency and urgency.   Musculoskeletal:  Negative for back pain, gait problem, joint swelling, myalgias, neck pain and neck stiffness.   Skin:  Negative for color change, rash and wound.   Allergic/Immunologic: Negative for environmental allergies and food allergies.   Neurological:  Negative for dizziness, seizures, facial asymmetry, speech difficulty, weakness, light-headedness, numbness and headaches.   Hematological:  Bruises/bleeds easily.   Psychiatric/Behavioral:  Negative for agitation, behavioral problems, dysphoric mood and hallucinations. The patient is  "not nervous/anxious.        OBJECTIVE:     Vital Signs  Pulse: 74  BP: 105/73  Pain Score: 0-No pain  Height: 5' 2" (157.5 cm)  Weight: 54 kg (119 lb)  Body mass index is 21.77 kg/m².      Physical Exam:  Vitals reviewed.    Constitutional: She appears well-developed and well-nourished. No distress.     Eyes: Pupils are equal, round, and reactive to light. Conjunctivae and EOM are normal.     Cardiovascular: Normal rate, regular rhythm, normal pulses and no edema.     Abdominal: Soft. Bowel sounds are normal.     Skin: Skin displays no rash on trunk and no rash on extremities. Skin displays no lesions on trunk and no lesions on extremities.     Psych/Behavior: She is alert. She is oriented to person, place, and time. She has a normal mood and affect.     Musculoskeletal: Gait is normal.        Neck: Range of motion is full. There is no tenderness. Muscle strength is 5/5. Tone is normal.        Back: Range of motion is full. There is no tenderness. Muscle strength is 5/5. Tone is normal.        Right Upper Extremities: Range of motion is full. There is no tenderness. Muscle strength is 5/5. Tone is normal.        Left Upper Extremities: Range of motion is full. There is no tenderness. Muscle strength is 5/5. Tone is normal.       Right Lower Extremities: Range of motion is full. There is no tenderness. Muscle strength is 5/5. Tone is normal.        Left Lower Extremities: Range of motion is full. There is no tenderness. Muscle strength is 5/5. Tone is normal.     Neurological:        Coordination: She has a normal Romberg Test, normal finger to nose coordination and normal tandem walking coordination.        Sensory: There is no sensory deficit in the trunk. There is no sensory deficit in the extremities.        DTRs: DTRs are DTRS NORMAL AND SYMMETRICnormal and symmetric. She displays no Babinski's sign on the right side. She displays no Babinski's sign on the left side.        Cranial nerves: Cranial nerve(s) II, " III, IV, V, VI, VII, VIII, IX, X, XI and XII are intact.         Diagnostic Results:  She has an MRI with and without contrast of the brain report available for review which I personally reviewed.  By report, this shows a 4.9 cm CSF isointense or cystic intra-axial left parietal mass without surrounding edema.  There is no associated enhancement.    ASSESSMENT/PLAN:     Ms. 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  Unfortunately, I do not have the imaging to personally review.  Before making any further treatment recommendations, I have asked the patient to get a copy of the MRI with and without contrast of the brain on a disc and bring to the office for my review.  Once this is reviewed, we will make further treatment plan at that time.  She knows she can call with any further questions or concerns in the meantime.        Note dictated with voice recognition software, please excuse any grammatical errors.

## 2024-08-21 ENCOUNTER — OFFICE VISIT (OUTPATIENT)
Dept: INTERNAL MEDICINE | Facility: CLINIC | Age: 64
End: 2024-08-21
Attending: INTERNAL MEDICINE
Payer: MEDICARE

## 2024-08-21 VITALS
BODY MASS INDEX: 22.52 KG/M2 | WEIGHT: 122.38 LBS | SYSTOLIC BLOOD PRESSURE: 108 MMHG | HEART RATE: 71 BPM | DIASTOLIC BLOOD PRESSURE: 70 MMHG | OXYGEN SATURATION: 98 % | HEIGHT: 62 IN

## 2024-08-21 DIAGNOSIS — Z98.890 HISTORY OF GASTRIC SURGERY: ICD-10-CM

## 2024-08-21 DIAGNOSIS — E55.9 VITAMIN D DEFICIENCY: ICD-10-CM

## 2024-08-21 DIAGNOSIS — M80.00XA AGE-RELATED OSTEOPOROSIS WITH CURRENT PATHOLOGICAL FRACTURE, INITIAL ENCOUNTER: ICD-10-CM

## 2024-08-21 DIAGNOSIS — M80.00XA OSTEOPOROSIS WITH CURRENT PATHOLOGICAL FRACTURE, UNSPECIFIED OSTEOPOROSIS TYPE, INITIAL ENCOUNTER: ICD-10-CM

## 2024-08-21 DIAGNOSIS — G93.0 BRAIN CYST: ICD-10-CM

## 2024-08-21 DIAGNOSIS — M62.838 MUSCLE SPASM: Primary | ICD-10-CM

## 2024-08-21 DIAGNOSIS — S22.41XD CLOSED FRACTURE OF MULTIPLE RIBS OF RIGHT SIDE WITH ROUTINE HEALING, SUBSEQUENT ENCOUNTER: ICD-10-CM

## 2024-08-21 PROCEDURE — 99215 OFFICE O/P EST HI 40 MIN: CPT | Mod: S$PBB,,, | Performed by: INTERNAL MEDICINE

## 2024-08-21 PROCEDURE — 99214 OFFICE O/P EST MOD 30 MIN: CPT | Mod: PBBFAC | Performed by: INTERNAL MEDICINE

## 2024-08-21 PROCEDURE — 99999 PR PBB SHADOW E&M-EST. PATIENT-LVL IV: CPT | Mod: PBBFAC,,, | Performed by: INTERNAL MEDICINE

## 2024-08-21 RX ORDER — CYCLOBENZAPRINE HCL 10 MG
10 TABLET ORAL 3 TIMES DAILY PRN
Qty: 30 TABLET | Refills: 6 | Status: SHIPPED | OUTPATIENT
Start: 2024-08-21 | End: 2024-08-31

## 2024-08-21 NOTE — PROGRESS NOTES
Subjective:   Patient ID: Trupti Coronado is a 64 y.o. female  Chief complaint:   Chief Complaint   Patient presents with    Follow-up     HPI  62F with hx of gastric surgery, b12 def, CHARIS, vit d def, essential tremor s/p fall with mult fractures:      Dexa scheduled   Discussed plan to Tx for porosis given fx   Saw nsx 8/2024 and cts 7/2024 and opthal 7/2024 -     - today has copy of discs of imaging   - using celebrex prn     Osteoporosis - dx 2/2 to significant fx   Taking calcium daily and vitamin d     Vit d def:   As above   Level improved 67 <- 41 <- 28    Abnormal brain mri:   Saw nsx as above - has f/u to review imaging after films visible in system  Has imaging and will upload to our system today   At lcv:   Last seen for hospital discharge follow up   Here for hospital discharge follow up   Admitted 5/31-6/6/2024  Since then saw nsx 7/5/2024 - rec visual field testing July 26        #############Not addressed today #######    Obesity - resolved:   Taking tirz through chronos and tolerating   Maintaining weight   No SE   Hx of gastric bypass - she is concerned that has hernia that needs to be repaired     HTN:  Meli meds - hctz 12.5 and propranolol 60  Bp 120/70  LA BB has helped with tremor     Noticed bulging at umbilicus - had hx of abdominoplasy and then abd hysterectomy   - small bulging sup to umbilicus and left abd    - intermittent abd pain but better after weight loss    - seen 8/2021 and deferred and ct abd not completed due to Charis and then sx stable but still present   As per [facility] note:  - if bears down she reports abd contents shift to right   - using Morning Complete pre and probiotic and helping to have bm  - intermittent abd pain - located in different places epigastric and right and at times on left upper abd   - pain achy and occurrence is varriable - 3 times per week to every 2 weeks   - feels like has to have a bm once pain starts   + bloatiing  Eating can occ help but often worsens sx   -  "pain can last 1h -2 hours   - prior abd surgeries - gastric surgery, hysterectomy   Interested in seeing:  Dr. Kanu Ramirez   - no reflux - will use protonix a couple of times per month   - using tums intermittently a couple of times per week but not bc of sx but bc enjoy tastes  No blood in stool   No hematemesis  No unexplained weight loss   No dysuria  Having bm     Adjustment disorder:   - doing well on Wellbutrin   More energy and feels back to self   More energy and motivated to exercise   Mood is good     B12 def: resolved off of suppl  Cont balanced diet   Prev:   Not on for > 1 year - did not get refills   Ran out of B12 inj and ran out - last was 10 months ago     Iron def:   Not taking oral iron other mvi   - resolved       Essential tremor:    - nancy ER propranolol at this time   - improved  Prev:  - affects ability to write and bothersome   - seen by neuro previously - discussed follow up to review tx options    GERD:  - stable   Sx intermittent - only taking ppi a couple of times per month when needed   At lcv:   Ordered CT abd/pelvis last year after lcv but not completed for sx discussed at that time - never completed - sx improved   - Reports GI sx reported at lcv improved - Covid pandemic interfered with those appt follow up     ##########    HM:  Prevnar 20  Flu in fall   Covid   Dexa - due 9/2025 for repeat - prev wnl - scheduled    Review of Systems    Objective:  Vitals:    08/21/24 0909   BP: 108/70   BP Location: Left arm   Patient Position: Sitting   BP Method: Medium (Manual)   Pulse: 71   SpO2: 98%   Weight: 55.5 kg (122 lb 5.7 oz)   Height: 5' 2" (1.575 m)         Body mass index is 22.38 kg/m².    Physical Exam  Vitals reviewed.   Constitutional:       Appearance: Normal appearance. She is well-developed.   HENT:      Head: Normocephalic and atraumatic.   Eyes:      Extraocular Movements: Extraocular movements intact.      Conjunctiva/sclera: Conjunctivae normal.   Cardiovascular:      " Rate and Rhythm: Normal rate and regular rhythm.      Pulses: Normal pulses.      Heart sounds: Normal heart sounds.   Pulmonary:      Effort: Pulmonary effort is normal.      Breath sounds: Normal breath sounds.   Abdominal:      General: Bowel sounds are normal.      Palpations: Abdomen is soft.   Musculoskeletal:         General: No swelling or tenderness.      Cervical back: Normal range of motion and neck supple.   Skin:     General: Skin is warm and dry.      Capillary Refill: Capillary refill takes less than 2 seconds.      Nails: There is no clubbing.   Neurological:      General: No focal deficit present.      Mental Status: She is alert and oriented to person, place, and time.      Gait: Gait normal.   Psychiatric:         Speech: Speech normal.         Behavior: Behavior normal.         Thought Content: Thought content normal.       Assessment:  1. Muscle spasm    2. Age-related osteoporosis with current pathological fracture, initial encounter    3. History of gastric surgery    4. Closed fracture of multiple ribs of right side with routine healing, subsequent encounter    5. Osteoporosis with current pathological fracture, unspecified osteoporosis type, initial encounter    6. Vitamin D deficiency        Plan:  Muscle spasm  -     cyclobenzaprine (FLEXERIL) 10 MG tablet; Take 1 tablet (10 mg total) by mouth 3 (three) times daily as needed for Muscle spasms.  Dispense: 30 tablet; Refill: 6  Improved   Refill sent in     Age-related osteoporosis with current pathological fracture, initial encounter  -     Ambulatory referral/consult to Endocrinology; Future; Expected date: 08/28/2024  Discussed trial of bisphosph and other pot options - she is concerned about risk of reflux given prior gastric surgery - would like to discuss options with endo   Referral signed   Dexa scheduled     History of gastric surgery  -     Ambulatory referral/consult to Endocrinology; Future; Expected date: 08/28/2024    Closed  fracture of multiple ribs of right side with routine healing, subsequent encounter  Improved   Follwoed by cts   Monitor     Osteoporosis with current pathological fracture, unspecified osteoporosis type, initial encounter  As per prob #2    Vitamin D deficiency  Improved   Cont suppl     Brain cyst  Has cd with images - will bring to radiology to be uploaded and notify nsx once updated - has f/u with specialists scheduled     F/u with specialists     40  min spent in care of patient including chart review, history, orders, physical, orders and coordination of care    Health Maintenance   Topic Date Due    Mammogram  03/12/2025    DEXA Scan  09/26/2025    Colorectal Cancer Screening  11/29/2027    Lipid Panel  02/27/2029    TETANUS VACCINE  06/30/2033    Hepatitis C Screening  Completed    Shingles Vaccine  Completed

## 2024-09-02 PROBLEM — M80.00XA OSTEOPOROSIS WITH CURRENT PATHOLOGICAL FRACTURE: Status: ACTIVE | Noted: 2024-09-02

## 2024-09-05 ENCOUNTER — HOSPITAL ENCOUNTER (OUTPATIENT)
Dept: RADIOLOGY | Facility: HOSPITAL | Age: 64
Discharge: HOME | End: 2024-09-05
Attending: PHYSICIAN ASSISTANT
Payer: MEDICARE

## 2024-09-05 ENCOUNTER — OFFICE VISIT (OUTPATIENT)
Dept: CARDIOTHORACIC SURGERY | Facility: CLINIC | Age: 64
End: 2024-09-05
Attending: PHYSICIAN ASSISTANT
Payer: MEDICARE

## 2024-09-05 VITALS
OXYGEN SATURATION: 96 % | HEIGHT: 62 IN | HEART RATE: 65 BPM | BODY MASS INDEX: 22.19 KG/M2 | DIASTOLIC BLOOD PRESSURE: 73 MMHG | WEIGHT: 120.56 LBS | SYSTOLIC BLOOD PRESSURE: 107 MMHG

## 2024-09-05 DIAGNOSIS — S22.41XS CLOSED FRACTURE OF MULTIPLE RIBS OF RIGHT SIDE, SEQUELA: Primary | ICD-10-CM

## 2024-09-05 DIAGNOSIS — S22.41XS CLOSED FRACTURE OF MULTIPLE RIBS OF RIGHT SIDE, SEQUELA: ICD-10-CM

## 2024-09-05 PROCEDURE — 99214 OFFICE O/P EST MOD 30 MIN: CPT | Mod: S$PBB,,, | Performed by: STUDENT IN AN ORGANIZED HEALTH CARE EDUCATION/TRAINING PROGRAM

## 2024-09-05 PROCEDURE — 99999 PR PBB SHADOW E&M-EST. PATIENT-LVL III: CPT | Mod: PBBFAC,,, | Performed by: STUDENT IN AN ORGANIZED HEALTH CARE EDUCATION/TRAINING PROGRAM

## 2024-09-05 PROCEDURE — 71250 CT THORAX DX C-: CPT | Mod: TC

## 2024-09-05 PROCEDURE — 99213 OFFICE O/P EST LOW 20 MIN: CPT | Mod: PBBFAC,25 | Performed by: STUDENT IN AN ORGANIZED HEALTH CARE EDUCATION/TRAINING PROGRAM

## 2024-09-05 NOTE — PROGRESS NOTES
[facility] - Thoracic Surgery   History & Physical    Patient Name: Trupti Coronado  MRN: [id number]  Attending Physician: Price Giron MD  Primary Care Provider: Neena Barrera MD    Subjective:     Chief Complaint/Reason for Admission: Rib fractures    History of Present Illness:    Mrs Coronado is a 64 y.o. female with a PMH of essential tremor and gastric bypass who presents following a ground level fall over a chair with injuries including R 6-9th rib fractures and right hemopneumothorax s/p chest tube placement 6/1. Patient was initially admitted to the ICU for respiratory monitoring but was discharged following chest tube removal with no oxygen requirements. Today, approximately 12 weeks post injury she reports minimal pain at rest but difficulty with sleeping, mostly well controlled with Oxycodone, muscle relaxant and celebrex.. She previouslt reported pain with sleeping which she currently denies. She denies any shortness of breath. She denies dyspnea on exertion.         Current Outpatient Medications on File Prior to Visit   Medication Sig    BIOTIN ORAL Take by mouth.    buPROPion (WELLBUTRIN XL) 150 MG TB24 tablet TAKE ONE TABLET BY MOUTH ONCE DAILY    calcium citrate (CALCITRATE) 200 mg (950 mg) tablet Take 1 tablet by mouth once daily.    celecoxib (CELEBREX) 200 MG capsule Take 1 capsule (200 mg total) by mouth 2 (two) times daily.    cholecalciferol, vitamin D3, (VITAMIN D3) 2,000 unit Cap Take 1 capsule (2,000 Units total) by mouth once daily.    hydroCHLOROthiazide (HYDRODIURIL) 12.5 MG Tab Take 1 tablet (12.5 mg total) by mouth once daily.    multivitamin capsule Take 1 capsule by mouth once daily.    omega-3 fatty acids/fish oil (FISH OIL-OMEGA-3 FATTY ACIDS) 300-1,000 mg capsule Take by mouth once daily.    propranoloL (INDERAL LA) 60 MG 24 hr capsule TAKE ONE CAPSULE BY MOUTH ONCE DAILY    syringe, disposable, 3 mL Syrg 1 each by Misc.(Non-Drug; Combo Route) route every 30 days. 22  gauge    TURMERIC ORAL Take by mouth.    pantoprazole (PROTONIX) 40 MG tablet Take 1 tablet (40 mg total) by mouth once daily. (Patient not taking: Reported on 2024)     No current facility-administered medications on file prior to visit.       Review of patient's allergies indicates:   Allergen Reactions    Levofloxacin Rash and Hives       Past Medical History:   Diagnosis Date    History of abnormal cervical Pap smear     History of other genital system and obstetric disorders 10/29/2015 1:13:44 PM    [facility] Historical - Quick Add: History of abnormal pap-No Additional Notes    History of other genital system and obstetric disorders 10/29/2015 1:13:44 PM    [facility] Historical - Quick Add: History of abnormal pap-No Additional Notes    Weight loss      Past Surgical History:   Procedure Laterality Date    ABDOMINOPLASTY      BREAST BIOPSY Left 2019    BREAST RECONSTRUCTION      lift - no implants     SECTION  1999    core needle biopsy of breast with u/s guidance      COSMETIC SURGERY      FACIAL COSMETIC SURGERY      GASTRIC BYPASS      HYSTERECTOMY      LIPOSUCTION      TOTAL ABDOMINAL HYSTERECTOMY W/ BILATERAL SALPINGOOPHORECTOMY      TOTAL REDUCTION MAMMOPLASTY Bilateral      Family History       Problem Relation (Age of Onset)    Arthritis Mother    Diabetes Mother, Sister    Heart disease Mother, Father, Brother    Hyperlipidemia Mother, Father    Hypertension Mother, Father, Sister    Macular degeneration Mother    No Known Problems Daughter, Daughter, Brother    Other Sister, Sister    Peripheral vascular disease Father    Stroke Father          Tobacco Use    Smoking status: Never    Smokeless tobacco: Never   Substance and Sexual Activity    Alcohol use: Yes     Comment: rare    Drug use: No    Sexual activity: Never     Partners: Male     Comment:       Review of Systems   Constitutional: Negative.    Respiratory: Negative.     Cardiovascular: Negative.     Gastrointestinal: Negative.    Genitourinary: Negative.    Musculoskeletal:  Positive for back pain.   Neurological: Negative.    Hematological: Negative.    Psychiatric/Behavioral: Negative.       Objective:     Vital Signs (Most Recent):  Pulse: 65 (09/05/24 1020)  BP: 107/73 (09/05/24 1020)  SpO2: 96 % (09/05/24 1020) Vital Signs (24h Range):  [unfilled]     Weight: 54.7 kg (120 lb 9.5 oz)  Body mass index is 22.06 kg/m².    Physical Exam  Constitutional:       Appearance: Normal appearance.   HENT:      Head: Normocephalic and atraumatic.   Eyes:      Extraocular Movements: Extraocular movements intact.      Pupils: Pupils are equal, round, and reactive to light.   Cardiovascular:      Rate and Rhythm: Normal rate and regular rhythm.   Pulmonary:      Effort: Pulmonary effort is normal. No respiratory distress.      Comments: There is no chest wall asymmetry. No flail movements. Mild tenderness to palpation of the posterior chest.. Prominent xyphoid, likely secondary to recent weight loss     Abdominal:      General: Abdomen is flat.      Palpations: Abdomen is soft.   Skin:     General: Skin is warm and dry.   Neurological:      General: No focal deficit present.      Mental Status: She is alert and oriented to person, place, and time.   Psychiatric:         Mood and Affect: Mood normal.       Significant Diagnostics:  CT CHEST 7/22  Impression:     1. There are multiple fractures of right ribs 6-9.  2. There is no evidence of new or expanding pneumothorax.  3. There is associated airspace opacity and atelectasis along the costal margin of the right lower and middle lobes..  4. Pulmonary trunk is enlarged and measures 3.2 cm may represent pulmonary arterial hypertension.  Clinical correlation is needed.  5. Hyperattenuated focal lesion measuring 14 x 9 mm in the left renal midpole (hyperdense cyst?).  If there is any clinical concern ultrasound of the kidneys is recommended for further evaluation.  6.  Additional findings.  Please see the above discussion.       Assessment/Plan:     64F with multiple rib fractures. There is no respiratory compromise.  Pain improved. CT today reviewed and there appears to be appropriate healing of her fractures     - F/u PRN   - If issues with pain in the future, patient instructed to f/u with Pain mgmt, who she has established care with      Price Giron M.D.  [phone] (direct)  Thoracic Surgery   Gianluca New

## 2024-09-25 ENCOUNTER — TELEPHONE (OUTPATIENT)
Dept: NEUROSURGERY | Facility: CLINIC | Age: 64
End: 2024-09-25
Payer: MEDICARE

## 2024-09-25 NOTE — TELEPHONE ENCOUNTER
Attempted to call and confirm that patient was still able to come to her scheduled appointment tomorrow with Dr. Dhillon at 9:30 AM. No answer. Left VM advising call back if appointment needs to be rescheduled.

## 2024-09-26 ENCOUNTER — TELEPHONE (OUTPATIENT)
Dept: NEUROSURGERY | Facility: CLINIC | Age: 64
End: 2024-09-26
Payer: MEDICARE

## 2024-09-26 ENCOUNTER — OFFICE VISIT (OUTPATIENT)
Dept: NEUROSURGERY | Facility: CLINIC | Age: 64
End: 2024-09-26
Payer: MEDICARE

## 2024-09-26 DIAGNOSIS — G93.0 BRAIN CYST: Primary | ICD-10-CM

## 2024-09-26 DIAGNOSIS — G25.0 TREMOR, ESSENTIAL: ICD-10-CM

## 2024-09-26 DIAGNOSIS — I63.89 OTHER CEREBRAL INFARCTION: ICD-10-CM

## 2024-09-26 PROCEDURE — 99213 OFFICE O/P EST LOW 20 MIN: CPT | Mod: PBBFAC | Performed by: NEUROLOGICAL SURGERY

## 2024-09-26 PROCEDURE — 99999 PR PBB SHADOW E&M-EST. PATIENT-LVL III: CPT | Mod: PBBFAC,,, | Performed by: NEUROLOGICAL SURGERY

## 2024-09-26 NOTE — PROGRESS NOTES
Neurosurgery  Established Patient    SUBJECTIVE:     History of Present Illness:  Ms. Coronado is a 64-year-old female who was referred to me by Dr. Neena Barrera. Her past medical history is significant for hypertension, peripheral vascular disease, anemia, bariatric surgery, and Plastic surgery. She also has a history of essential tremor. She had a ground level fall over a chair in June of 2024. Injuries from the small included rib fractures and a right hemopneumothorax status post chest tube placement. Due to the trauma, head imaging was done at that time which showed an incidental finding of a brain cyst. Given this, she was referred for neurosurgical follow-up. Currently, she denies headaches, blurry vision, double vision, numbness, tingling, weakness, or seizure-like activity. She was asymptomatic. She does have chronic low back pain which is worse when she was lying down.     Interval History 9/26/2024:  Ms. Coronado is a female who is seeing me today in follow-up.  Her last neurosurgery clinic appointment was on August 15, 2024.  At that time, she was asymptomatic but had an incidental finding of a brain cyst that was discovered after head trauma in June of 2024.  She was here today to see me in follow-up with her imaging studies from that time.  She continues to deny headaches, nausea, vomiting, blurry vision, weakness, paresthesias, or seizure activity.  She continues to be asymptomatic.    Review of patient's allergies indicates:   Allergen Reactions    Levofloxacin Rash and Hives       Current Outpatient Medications   Medication Sig Dispense Refill    BIOTIN ORAL Take by mouth.      buPROPion (WELLBUTRIN XL) 150 MG TB24 tablet TAKE ONE TABLET BY MOUTH ONCE DAILY 90 tablet 3    calcium citrate (CALCITRATE) 200 mg (950 mg) tablet Take 1 tablet by mouth once daily.      celecoxib (CELEBREX) 200 MG capsule Take 1 capsule (200 mg total) by mouth 2 (two) times daily. 60 capsule 2    cholecalciferol, vitamin  D3, (VITAMIN D3) 2,000 unit Cap Take 1 capsule (2,000 Units total) by mouth once daily.      hydroCHLOROthiazide (HYDRODIURIL) 12.5 MG Tab Take 1 tablet (12.5 mg total) by mouth once daily. 90 tablet 2    multivitamin capsule Take 1 capsule by mouth once daily.      omega-3 fatty acids/fish oil (FISH OIL-OMEGA-3 FATTY ACIDS) 300-1,000 mg capsule Take by mouth once daily.      propranoloL (INDERAL LA) 60 MG 24 hr capsule TAKE ONE CAPSULE BY MOUTH ONCE DAILY 90 capsule 3    syringe, disposable, 3 mL Syrg 1 each by Misc.(Non-Drug; Combo Route) route every 30 days. 22 gauge 25 each 3    TURMERIC ORAL Take by mouth.      pantoprazole (PROTONIX) 40 MG tablet Take 1 tablet (40 mg total) by mouth once daily. (Patient not taking: Reported on 2024) 90 tablet 0     No current facility-administered medications for this visit.       Past Medical History:   Diagnosis Date    History of abnormal cervical Pap smear     History of other genital system and obstetric disorders 10/29/2015 1:13:44 PM    [facility] Historical - Quick Add: History of abnormal pap-No Additional Notes    History of other genital system and obstetric disorders 10/29/2015 1:13:44 PM    [facility] Historical - Quick Add: History of abnormal pap-No Additional Notes    Weight loss      Past Surgical History:   Procedure Laterality Date    ABDOMINOPLASTY      BREAST BIOPSY Left 2019    BREAST RECONSTRUCTION      lift - no implants     SECTION  1999    core needle biopsy of breast with u/s guidance      COSMETIC SURGERY      FACIAL COSMETIC SURGERY      GASTRIC BYPASS      HYSTERECTOMY      LIPOSUCTION      TOTAL ABDOMINAL HYSTERECTOMY W/ BILATERAL SALPINGOOPHORECTOMY      TOTAL REDUCTION MAMMOPLASTY Bilateral      Family History       Problem Relation (Age of Onset)    Arthritis Mother    Diabetes Mother, Sister    Heart disease Mother, Father, Brother    Hyperlipidemia Mother, Father    Hypertension Mother, Father, Sister    Macular  degeneration Mother    No Known Problems Daughter, Daughter, Brother    Other Sister, Sister    Peripheral vascular disease Father    Stroke Father          Social History     Socioeconomic History    Marital status:    Occupational History    Occupation: nurse   Tobacco Use    Smoking status: Never    Smokeless tobacco: Never   Substance and Sexual Activity    Alcohol use: Yes     Comment: rare    Drug use: No    Sexual activity: Never     Partners: Male     Comment:     Social History Narrative    Plastic surgery nurse    From [place]    Moved back to [place] 2014 and then moved to [facility] July 2017     Social Determinants of Health     Food Insecurity: No Food Insecurity (6/1/2024)    Received from [facility]    Hunger Vital Sign     Worried About Running Out of Food in the Last Year: Never true     Ran Out of Food in the Last Year: Never true   Transportation Needs: No Transportation Needs (6/1/2024)    Received from [facility]    PRAPARE - Transportation     Lack of Transportation (Medical): No     Lack of Transportation (Non-Medical): No       Review of Systems    OBJECTIVE:     Vital Signs  Pain Score:   3  There is no height or weight on file to calculate BMI.    Physical Exam:  Vitals reviewed.    Constitutional: She appears well-developed and well-nourished. No distress.     Eyes: Pupils are equal, round, and reactive to light. Conjunctivae and EOM are normal.     Cardiovascular: Normal rate, regular rhythm, normal pulses and no edema.     Abdominal: Soft. Bowel sounds are normal.     Skin: Skin displays no rash on trunk and no rash on extremities. Skin displays no lesions on trunk and no lesions on extremities.     Psych/Behavior: She is alert. She is oriented to person, place, and time. She has a normal mood and affect.     Musculoskeletal: Gait is normal.        Neck: Range of motion is full. There is no tenderness. Muscle strength is 5/5. Tone is normal.        Back: Range of motion  is full. There is no tenderness. Muscle strength is 5/5. Tone is normal.        Right Upper Extremities: Range of motion is full. There is no tenderness. Muscle strength is 5/5. Tone is normal.        Left Upper Extremities: Range of motion is full. There is no tenderness. Muscle strength is 5/5. Tone is normal.       Right Lower Extremities: Range of motion is full. There is no tenderness. Muscle strength is 5/5. Tone is normal.        Left Lower Extremities: Range of motion is full. There is no tenderness. Muscle strength is 5/5. Tone is normal.     Neurological:        Coordination: She has a normal Romberg Test, normal finger to nose coordination and normal tandem walking coordination.        Sensory: There is no sensory deficit in the trunk. There is no sensory deficit in the extremities.        DTRs: DTRs are DTRS NORMAL AND SYMMETRICnormal and symmetric. She displays no Babinski's sign on the right side. She displays no Babinski's sign on the left side.        Cranial nerves: Cranial nerve(s) II, III, IV, V, VI, VII, VIII, IX, X, XI and XII are intact.         Diagnostic Results:  She has an MRI with and without contrast of the brain available for review which I personally reviewed.  This is from June 2024.  This shows a large parieto-occipital left-sided cyst with adjacent mass effect.  There is no surrounding vasogenic edema.  There is no abnormal enhancement.  This is slightly irregular but follows CSF patterns on all signal sequences and appears to be an arachnoid cyst.    ASSESSMENT/PLAN:     Ms. Coronado is a 64-year-old female with an incidentally found likely arachnoid.  This was found after head trauma.  She was asymptomatic.  Since she was asymptomatic, we will continue to follow this conservatively.  We will plan on a repeat MRI with and without contrast of the brain at the six-month christopher.  This will be at the beginning of December 2024.  We will get an MRI at that point and I will see her back in  follow-up.  She knows she can call with any further questions or concerns in the meantime.  Per the patient's request, I have given her a referral to movement Disorder Neurology for her known essential tremor.        Note dictated with voice recognition software, please excuse any grammatical errors.

## 2024-09-30 ENCOUNTER — HOSPITAL ENCOUNTER (OUTPATIENT)
Dept: RADIOLOGY | Facility: OTHER | Age: 64
Discharge: HOME | End: 2024-09-30
Attending: INTERNAL MEDICINE
Payer: MEDICARE

## 2024-09-30 DIAGNOSIS — N95.9 MENOPAUSAL PROBLEM: ICD-10-CM

## 2024-09-30 PROCEDURE — 77080 DXA BONE DENSITY AXIAL: CPT | Mod: 26,,, | Performed by: RADIOLOGY

## 2024-09-30 PROCEDURE — 77080 DXA BONE DENSITY AXIAL: CPT | Mod: TC

## 2024-10-09 ENCOUNTER — OFFICE VISIT (OUTPATIENT)
Dept: INTERNAL MEDICINE | Facility: CLINIC | Age: 64
End: 2024-10-09
Attending: INTERNAL MEDICINE
Payer: MEDICARE

## 2024-10-09 VITALS
SYSTOLIC BLOOD PRESSURE: 102 MMHG | DIASTOLIC BLOOD PRESSURE: 74 MMHG | WEIGHT: 120.56 LBS | HEART RATE: 65 BPM | HEIGHT: 62 IN | OXYGEN SATURATION: 98 % | BODY MASS INDEX: 22.19 KG/M2

## 2024-10-09 DIAGNOSIS — S22.41XD CLOSED FRACTURE OF MULTIPLE RIBS OF RIGHT SIDE WITH ROUTINE HEALING, SUBSEQUENT ENCOUNTER: ICD-10-CM

## 2024-10-09 DIAGNOSIS — I10 ESSENTIAL HYPERTENSION: ICD-10-CM

## 2024-10-09 DIAGNOSIS — G93.0 BRAIN CYST: Primary | ICD-10-CM

## 2024-10-09 DIAGNOSIS — M80.00XD AGE-RELATED OSTEOPOROSIS WITH CURRENT PATHOLOGICAL FRACTURE WITH ROUTINE HEALING, SUBSEQUENT ENCOUNTER: ICD-10-CM

## 2024-10-09 DIAGNOSIS — E55.9 VITAMIN D DEFICIENCY: ICD-10-CM

## 2024-10-09 PROCEDURE — 99999 PR PBB SHADOW E&M-EST. PATIENT-LVL III: CPT | Mod: PBBFAC,,, | Performed by: INTERNAL MEDICINE

## 2024-10-09 PROCEDURE — G2211 COMPLEX E/M VISIT ADD ON: HCPCS | Mod: S$PBB,,, | Performed by: INTERNAL MEDICINE

## 2024-10-09 PROCEDURE — 99214 OFFICE O/P EST MOD 30 MIN: CPT | Mod: S$PBB,,, | Performed by: INTERNAL MEDICINE

## 2024-10-09 PROCEDURE — 99213 OFFICE O/P EST LOW 20 MIN: CPT | Mod: PBBFAC | Performed by: INTERNAL MEDICINE

## 2024-10-09 NOTE — PROGRESS NOTES
Subjective:   Patient ID: Trupti Coronado is a 64 y.o. female  Chief complaint:   Chief Complaint   Patient presents with    Follow-up     HPI    64F with hx of gastric surgery, b12 def, CHARIS, vit d def, essential tremor s/p fall with mult fractures:  Here for 6 week follow up to review dexa     Osteoporosis - dx 2/2 to significant fx   Taking calcium daily and vitamin d   Dexa utd 9/2024 and reviewed  Endo appt scheduled 1/2025  Prev:   Dexa scheduled   Discussed plan to Tx for porosis given fx   Saw nsx 8/2024 and cts 7/2024 and opthal 7/2024 -     Vit d def:   As above   Level improved 67 <- 41 <- 28    Abnormal brain mri:   Since [facility] saw outside providers  Had appt with specialists - images uploaded to system  Prev:  Saw nsx as above - has f/u to review imaging after films visible in system  Has imaging and will upload to our system today   At [facility]:   Last seen for hospital discharge follow up   Here for hospital discharge follow up   Admitted 5/31-6/6/2024  Since then saw nsx 7/5/2024 - rec visual field testing July 26  At [facility]:   - today has copy of discs of imaging   - using celebrex prn     HTN:  Meli meds - hctz 12.5 and propranolol 60  Bp 120/70  LA BB has helped with tremor     #############Not addressed today #######    Obesity - resolved:   Taking tirz through chron[facility] and tolerating   Maintaining weight   No SE   Hx of gastric bypass - she is concerned that has hernia that needs to be repaired     Noticed bulging at umbilicus - had hx of abdominoplasy and then abd hysterectomy   - small bulging sup to umbilicus and left abd    - intermittent abd pain but better after weight loss    - seen 8/2021 and deferred and ct abd not completed due to Charis and then sx stable but still present   As per [facility] note:  - if bears down she reports abd contents shift to right   - using Morning Complete pre and probiotic and helping to have bm  - intermittent abd pain - located in different places epigastric and right and at times  "on left upper abd   - pain achy and occurrence is varriable - 3 times per week to every 2 weeks   - feels like has to have a bm once pain starts   + bloatiing  Eating can occ help but often worsens sx   - pain can last 1h -2 hours   - prior abd surgeries - gastric surgery, hysterectomy   Interested in seeing:  Dr. Kanu Ramirez   - no reflux - will use protonix a couple of times per month   - using tums intermittently a couple of times per week but not bc of sx but bc enjoy tastes  No blood in stool   No hematemesis  No unexplained weight loss   No dysuria  Having bm     Adjustment disorder:   - doing well on Wellbutrin   More energy and feels back to self   More energy and motivated to exercise   Mood is good     B12 def: resolved off of suppl  Cont balanced diet   Prev:   Not on for > 1 year - did not get refills   Ran out of B12 inj and ran out - last was 10 months ago     Iron def:   Not taking oral iron other mvi   - resolved       Essential tremor:    - nancy ER propranolol at this time   - improved  Prev:  - affects ability to write and bothersome   - seen by neuro previously - discussed follow up to review tx options    GERD:  - stable   Sx intermittent - only taking ppi a couple of times per month when needed   At [facility]:   Ordered CT abd/pelvis last year after [facility] but not completed for sx discussed at that time - never completed - sx improved   - Reports GI sx reported at [facility] - Covid pandemic interfered with those appt follow up     ##########    HM:  Prevnar 20  Flu vaccine and rsv utd   Covid     Review of Systems    Objective:  Vitals:    10/09/24 1015   BP: 102/74   BP Location: Left arm   Patient Position: Sitting   Pulse: 65   SpO2: 98%   Weight: 54.7 kg (120 lb 9.5 oz)   Height: 5' 2" (1.575 m)     Body mass index is 22.06 kg/m².    Physical Exam  Vitals reviewed.   Constitutional:       Appearance: Normal appearance. She is well-developed.   HENT:      Head: Normocephalic and atraumatic. "   Eyes:      Extraocular Movements: Extraocular movements intact.      Conjunctiva/sclera: Conjunctivae normal.   Cardiovascular:      Rate and Rhythm: Normal rate and regular rhythm.      Pulses: Normal pulses.      Heart sounds: Normal heart sounds.   Pulmonary:      Effort: Pulmonary effort is normal.      Breath sounds: Normal breath sounds.   Abdominal:      General: Bowel sounds are normal.      Palpations: Abdomen is soft.   Musculoskeletal:         General: No swelling or tenderness.      Cervical back: Normal range of motion and neck supple.   Skin:     General: Skin is warm and dry.      Capillary Refill: Capillary refill takes less than 2 seconds.      Nails: There is no clubbing.   Neurological:      General: No focal deficit present.      Mental Status: She is alert and oriented to person, place, and time.      Gait: Gait normal.   Psychiatric:         Speech: Speech normal.         Behavior: Behavior normal.         Thought Content: Thought content normal.       Assessment:  1. Brain cyst    2. Essential hypertension    3. Closed fracture of multiple ribs of right side with routine healing, subsequent encounter    4. Age-related osteoporosis with current pathological fracture with routine healing, subsequent encounter    5. Vitamin D deficiency      Plan:  Trupti was seen today for follow-up.    Diagnoses and all orders for this visit:    Brain cyst  F/u with specialists   Stable     Essential hypertension  Stable   Cont regimen    Closed fracture of multiple ribs of right side with routine healing, subsequent encounter  Improved   F/u with specialists prn     Age-related osteoporosis with current pathological fracture with routine healing, subsequent encounter  Stable   Cont kwame nad vit d   Dexa utd   F/u with endo as scheduled to discuss med options - hx of gastric surgery     Vitamin D deficiency  Stable   Cont suppl   Monitor level     Visit today included increased complexity associated with the  care of the episodic problem vit d def, rib fractures addressed and managing the longitudinal care of the patient due to the serious and/or complex managed problem(s) htn, osteoporosis.        Health Maintenance   Topic Date Due    Mammogram  03/12/2025    DEXA Scan  09/30/2027    Colorectal Cancer Screening  11/29/2027    Lipid Panel  02/27/2029    TETANUS VACCINE  06/30/2033    Hepatitis C Screening  Completed    Shingles Vaccine  Completed

## 2024-10-13 PROBLEM — E66.01 SEVERE OBESITY (BMI 35.0-39.9) WITH COMORBIDITY: Status: RESOLVED | Noted: 2022-09-17 | Resolved: 2024-10-13

## 2024-10-28 ENCOUNTER — PATIENT MESSAGE (OUTPATIENT)
Dept: NEUROSURGERY | Facility: CLINIC | Age: 64
End: 2024-10-28
Payer: MEDICARE

## 2024-11-04 ENCOUNTER — OFFICE VISIT (OUTPATIENT)
Dept: DERMATOLOGY | Facility: CLINIC | Age: 64
End: 2024-11-04
Payer: MEDICARE

## 2024-11-04 DIAGNOSIS — D22.9 MULTIPLE BENIGN NEVI: ICD-10-CM

## 2024-11-04 DIAGNOSIS — Z12.83 SCREENING EXAM FOR SKIN CANCER: ICD-10-CM

## 2024-11-04 DIAGNOSIS — L98.9 SKIN LESION: ICD-10-CM

## 2024-11-04 DIAGNOSIS — B00.9 HERPES SIMPLEX: ICD-10-CM

## 2024-11-04 DIAGNOSIS — L82.1 SEBORRHEIC KERATOSES: ICD-10-CM

## 2024-11-04 DIAGNOSIS — L81.4 LENTIGINES: Primary | ICD-10-CM

## 2024-11-04 PROCEDURE — 99203 OFFICE O/P NEW LOW 30 MIN: CPT | Mod: S$PBB,,, | Performed by: STUDENT IN AN ORGANIZED HEALTH CARE EDUCATION/TRAINING PROGRAM

## 2024-11-04 PROCEDURE — 99999 PR PBB SHADOW E&M-EST. PATIENT-LVL III: CPT | Mod: PBBFAC,,, | Performed by: STUDENT IN AN ORGANIZED HEALTH CARE EDUCATION/TRAINING PROGRAM

## 2024-11-04 PROCEDURE — 99213 OFFICE O/P EST LOW 20 MIN: CPT | Mod: PBBFAC | Performed by: STUDENT IN AN ORGANIZED HEALTH CARE EDUCATION/TRAINING PROGRAM

## 2024-11-04 RX ORDER — VALACYCLOVIR HYDROCHLORIDE 1 G/1
TABLET, FILM COATED ORAL
Qty: 8 TABLET | Refills: 1 | Status: SHIPPED | OUTPATIENT
Start: 2024-11-04

## 2024-11-04 NOTE — PROGRESS NOTES
Subjective:      Patient ID:  Trupti Coronado is a 64 y.o. female who presents for   Chief Complaint   Patient presents with    Skin Check     TBSE     Trupti Coronado is a 64 y.o. female who presents for: FBSE screening exam for skin cancer.    New patient    The patient has the following lesions of concern:  Location: Scalp  Duration: 15+ yrs  Symptoms: Had brow lift may be scar   Relieving factors/Previous treatments: none    Pt was hospitalized in May for fall, brain issue was discovered, Ophthalmologist found moles in eye potentially pre-melanoma per patient    Prior nurse in plastic surgery    Pertinent history:  History of blistering sunburns: Yes  History of tanning bed use: Yes  Family history of melanoma: No  Personal history of mole removal: No  Personal history of skin cancer: No          Review of Systems   Skin:  Positive for daily sunscreen use (face) and activity-related sunscreen use. Negative for recent sunburn and wears hat.   Hematologic/Lymphatic: Bruises/bleeds easily.       Objective:   Physical Exam   Constitutional: She appears well-developed and well-nourished. No distress.   Neurological: She is alert and oriented to person, place, and time. She is not disoriented.   Psychiatric: She has a normal mood and affect.   Skin:   Areas Examined (abnormalities noted in diagram):   Scalp / Hair Palpated and Inspected  Head / Face Inspection Performed  Neck Inspection Performed  Chest / Axilla Inspection Performed  Abdomen Inspection Performed  Genitals / Buttocks / Groin Inspection Performed  Back Inspection Performed  RUE Inspected  LUE Inspection Performed  RLE Inspected  LLE Inspection Performed  Nails and Digits Inspection Performed                 Diagram Legend     Erythematous scaling macule/papule c/w actinic keratosis       Vascular papule c/w angioma      Pigmented verrucoid papule/plaque c/w seborrheic keratosis      Yellow umbilicated papule c/w sebaceous hyperplasia      Irregularly shaped  tan macule c/w lentigo     1-2 mm smooth white papules consistent with Milia      Movable subcutaneous cyst with punctum c/w epidermal inclusion cyst      Subcutaneous movable cyst c/w pilar cyst      Firm pink to brown papule c/w dermatofibroma      Pedunculated fleshy papule(s) c/w skin tag(s)      Evenly pigmented macule c/w junctional nevus     Mildly variegated pigmented, slightly irregular-bordered macule c/w mildly atypical nevus      Flesh colored to evenly pigmented papule c/w intradermal nevus       Pink pearly papule/plaque c/w basal cell carcinoma      Erythematous hyperkeratotic cursted plaque c/w SCC      Surgical scar with no sign of skin cancer recurrence      Open and closed comedones      Inflammatory papules and pustules      Verrucoid papule consistent consistent with wart     Erythematous eczematous patches and plaques     Dystrophic onycholytic nail with subungual debris c/w onychomycosis     Umbilicated papule    Erythematous-base heme-crusted tan verrucoid plaque consistent with inflamed seborrheic keratosis     Erythematous Silvery Scaling Plaque c/w Psoriasis     See annotation      Assessment / Plan:        Lentigines  This is a benign hyperpigmented sun induced lesion. Recommend daily sun protection/avoidance and use of at least SPF 30, broad spectrum sunscreen (OTC drug) will reduce the number of new lesions. Treatment of these benign lesions are considered cosmetic.    Multiple benign nevi  Reassurance    Seborrheic keratoses  These are benign inherited growths without a malignant potential. Reassurance given to patient. No treatment is necessary.     Herpes simplex  Recurrent on R chest with tingling/blisters x years  Occurring infrequently, recommend Valtrex prn flares  -     valACYclovir (VALTREX) 1000 MG tablet; Take 2 pills (2000 mg) BID x 1 day for flares  Dispense: 8 tablet; Refill: 1    Screening exam for skin cancer  Total body skin examination performed today including at  least 12 points as noted in physical examination. No lesions suspicious for malignancy noted.    Recommend daily sun protection/avoidance, use of at least SPF 30, broad spectrum sunscreen (OTC drug), skin self examinations, and routine physician surveillance to optimize early detection    Recommend continue to follow with ophthalmology for ocular screening    RTC 1 yr, sooner prn

## 2024-11-07 NOTE — PROGRESS NOTES
"Date:  11/11/2024    ?  Referring Provider:   Neena Barrera MD    Copies of Letters to the Following:   Neena Barrera MD    Chief Complaint:  I saw Trupti Coronado at the [facility] for neuro-ophthalmic evaluation.   She is a 64 y.o. female with a history of HTN, B12 deficiency, h/o nahid-en-Y bypass, tremor, left parietal cystic mass, who presents for evaluation of formal visual fields.    History:     HPI    Referred: Dr. Barrera     65 y/o female present to Neuro-ophthalmic clinic for brain cyst   evaluation. She reports no visual change/loss. Last eye exam was August 2024. She has eye irritation, self treating with At's. No headaches, no   migraines, no diplopia, no floaters, or flashes of lights reported.   History of eyelids s/x- upper/lower.     Eyemeds  Systane OU PRN  Pataday OU PRN  Last edited by Francheska Burnette on 11/11/2024 12:10 PM.          9/26/2024 Biro:  "Ms. Coronado is a 64-year-old female who was referred to me by Dr. Neena Barrera. Her past medical history is significant for hypertension, peripheral vascular disease, anemia, bariatric surgery, and Plastic surgery. She also has a history of essential tremor. She had a ground level fall over a chair in June of 2024. Injuries from the small included rib fractures and a right hemopneumothorax status post chest tube placement. Due to the trauma, head imaging was done at that time which showed an incidental finding of a brain cyst. Given this, she was referred for neurosurgical follow-up. Currently, she denies headaches, blurry vision, double vision, numbness, tingling, weakness, or seizure-like activity. She was asymptomatic. She does have chronic low back pain which is worse when she was lying down.      Interval History 9/26/2024:  Ms. Coronado is a female who is seeing me today in follow-up.  Her last neurosurgery clinic appointment was on August 15, 2024.  At that time, she was asymptomatic but had an incidental " "finding of a brain cyst that was discovered after head trauma in June of 2024.  She was here today to see me in follow-up with her imaging studies from that time.  She continues to deny headaches, nausea, vomiting, blurry vision, weakness, paresthesias, or seizure activity.  She continues to be asymptomatic.   "  ?  Current Outpatient Medications   Medication Sig Dispense Refill    BIOTIN ORAL Take by mouth.      buPROPion (WELLBUTRIN XL) 150 MG TB24 tablet TAKE ONE TABLET BY MOUTH ONCE DAILY 90 tablet 3    calcium citrate (CALCITRATE) 200 mg (950 mg) tablet Take 1 tablet by mouth once daily.      cholecalciferol, vitamin D3, (VITAMIN D3) 2,000 unit Cap Take 1 capsule (2,000 Units total) by mouth once daily.      hydroCHLOROthiazide (HYDRODIURIL) 12.5 MG Tab Take 1 tablet (12.5 mg total) by mouth once daily. 90 tablet 2    multivitamin capsule Take 1 capsule by mouth once daily.      omega-3 fatty acids/fish oil (FISH OIL-OMEGA-3 FATTY ACIDS) 300-1,000 mg capsule Take by mouth once daily.      propranoloL (INDERAL LA) 60 MG 24 hr capsule TAKE ONE CAPSULE BY MOUTH ONCE DAILY 90 capsule 3    syringe, disposable, 3 mL Syrg 1 each by Misc.(Non-Drug; Combo Route) route every 30 days. 22 gauge 25 each 3    TURMERIC ORAL Take by mouth.      valACYclovir (VALTREX) 1000 MG tablet Take 2 pills (2000 mg) BID x 1 day for flares 8 tablet 1     No current facility-administered medications for this visit.     Review of patient's allergies indicates:   Allergen Reactions    Levofloxacin Rash and Hives     Past Medical History:   Diagnosis Date    History of abnormal cervical Pap smear     History of other genital system and obstetric disorders 10/29/2015 1:13:44 PM    [facility] Historical - Quick Add: History of abnormal pap-No Additional Notes    History of other genital system and obstetric disorders 10/29/2015 1:13:44 PM    [facility] Historical - Quick Add: History of abnormal pap-No Additional Notes    Weight loss  "     Past Surgical History:   Procedure Laterality Date    ABDOMINOPLASTY      BREAST BIOPSY Left 2019    BREAST RECONSTRUCTION      lift - no implants     SECTION  1999    core needle biopsy of breast with u/s guidance      COSMETIC SURGERY      FACIAL COSMETIC SURGERY      GASTRIC BYPASS      HYSTERECTOMY      LIPOSUCTION      TOTAL ABDOMINAL HYSTERECTOMY W/ BILATERAL SALPINGOOPHORECTOMY      TOTAL REDUCTION MAMMOPLASTY Bilateral      Family History   Problem Relation Name Age of Onset    Heart disease Mother Deana     Hypertension Mother Deana     Hyperlipidemia Mother Deana     Diabetes Mother Deana     Macular degeneration Mother Deana     Arthritis Mother Deana     Stroke Father Jeffry     Hypertension Father Jeffry     Hyperlipidemia Father Jeffry     Peripheral vascular disease Father Jeffry     Heart disease Father Jeffry         congenital heart valve defect    Diabetes Sister Brian     Hypertension Sister Brian     Other Sister Brian         fatty liver    Heart disease Brother Jose     No Known Problems Daughter adopted     No Known Problems Daughter      No Known Problems Brother      Other Sister       Social History     Socioeconomic History    Marital status:    Occupational History    Occupation: nurse   Tobacco Use    Smoking status: Never    Smokeless tobacco: Never   Substance and Sexual Activity    Alcohol use: Yes     Comment: rare    Drug use: No    Sexual activity: Never     Partners: Male     Comment:     Social History Narrative    Plastic surgery nurse    From [place]    Moved back to [place]  and then moved to [facility] 2017     Social Drivers of Health     Food Insecurity: No Food Insecurity (2024)    Received from [facility]    Hunger Vital Sign     Worried About Running Out of Food in the Last Year: Never true     Ran Out of Food in the Last Year: Never true   Transportation Needs: No Transportation Needs (2024)    Received from  [facility] - [facility]     Lack of Transportation (Medical): No     Lack of Transportation (Non-Medical): No       Examination:  She was well-appearing. She was alert and oriented. Attention span and concentration were normal. Speech, language, memory, and general knowledge were intact.      Her distance visual acuity without correction was 20/25  in the right eye and 20/20  in the left eye.  Her near visual acuity with correction was J1 in the right eye and J1 in the left eye.      She perceived 8/8 OD and 8/8 OS Ishihara color plates correctly. Pupils were brisk to light without an afferent defect. Ocular ductions were full. Orthophoric in primary, right, and left gaze by cross cover. There was no nystagmus. Saccades and pursuits were normal. Lids were symmetric.     Optic discs appeared normal without swelling or pallor. Pupillary dilation was not necessary for visualization of the optic disc today.     Laboratories Reviewed:     N/a  ?  Neuroimaging Reviewed:     6/2024 MRI brain w/wo contrast  1.   4.9 cm thin walled CSF isointense/cystic intra-axial left parietal mass without surrounding edema. There is no associated enhancement, FLAIR signal abnormality, or mineralization. Findings suggest neuroglial cyst. Although if there is concern for infection, ie hydatid cyst could be considered.  Purely cystic, nonenhancing glioneuronal tumor is less likely. Follow-up is recommended within 3 months or as indicated by clinical symptoms.   2.   No acute infarction or intracranial enhancement.     ?  Ocular Imaging, Photos, Records Reviewed:     7/2024 [facility]  OCT RNFL 7/26  RE: Full, no thinning  LE: Full, no thinning    HVF 7/2024  RE: Full  LE: Full      OCT RNFL Today 11/11/2024:   Right Eye - Average RNFL 88 all segments normal   Left Eye - Average RNFL 90 all segments normal     Normal macular architecture OU    Visual Field Test 24-2 OU Today 11/11/2024: Right Eye - fixation losses 11/11, false  positives 22%, false negatives 6%, MD 1.28dB, Impression OD: full. Left Eye - fixation losses 5/7, false positives 23%, false negatives 0%, MD 0.91dB, Impression OS: full.  ?  Impression:  Trupti Coronado has history of HTN, B12 deficiency, h/o nahid-en-Y bypass, tremor, left parietal cystic mass, who presents for evaluation of formal visual fields. They report no changes in vision since last visit. Neuro-ophthalmologic examination was notable for good visual acuities, full color vision, normal ocular motility and alignment. OCT with normal and symmetric peripapillary RNFL thicknesses. Formal visual fields were full OD and OS. She has been told in the past that she has a retinal nevus which should have b-scan but she has not yet seen a retina specialist.     We discussed changes which would suggest any change in her cystic parietal mass, but my suspicion for any visual compromise is low.   ?  Plan:  1. Retina eval for reported nevus/nevi  2. Surveillance MRIs as planned per Dr. Ruano  3. Follow up with optometry/ophthalmology for yearly routine eye exams and refraction needs    Follow-up:  I will see her in follow-up in 1 year or sooner with any change.  ?OCT and HVF  ?  Visit Checklist (as applicable):  1. Status of new and prior symptoms discussed? yes  2. Neuroimaging reviewed/ ordered as appropriate? yes  3. Ocular imaging and photos reviewed/ ordered as appropriate? yes  4. Plan for work-up and treatment discussed with patient? yes  5. Potential medication side-effects and monitoring plan discussed? N/a  6. Review of outside medical records was performed and pertinent details are summarized in the HPI above? yes    Time spent on this encounter: 60 minutes. This includes face to face time and non-face to face time preparing to see the patient (eg, review of tests), obtaining and/or reviewing separately obtained history, documenting clinical information in the electronic or other health record, independently  interpreting results and communicating results to the patient/family/caregiver, or care coordinator.      KWAN Suarez  Neuro-Ophthalmology Consultant

## 2024-11-11 ENCOUNTER — OFFICE VISIT (OUTPATIENT)
Dept: OPHTHALMOLOGY | Facility: CLINIC | Age: 64
End: 2024-11-11
Payer: MEDICARE

## 2024-11-11 ENCOUNTER — CLINICAL SUPPORT (OUTPATIENT)
Dept: OPHTHALMOLOGY | Facility: CLINIC | Age: 64
End: 2024-11-11
Payer: MEDICARE

## 2024-11-11 ENCOUNTER — TELEPHONE (OUTPATIENT)
Dept: NEUROLOGY | Facility: CLINIC | Age: 64
End: 2024-11-11
Payer: MEDICARE

## 2024-11-11 DIAGNOSIS — G93.0 BRAIN CYST: ICD-10-CM

## 2024-11-11 DIAGNOSIS — H53.15 VISUAL DISTORTIONS OF SHAPE AND SIZE: Primary | ICD-10-CM

## 2024-11-11 PROCEDURE — 92083 EXTENDED VISUAL FIELD XM: CPT | Mod: PBBFAC | Performed by: STUDENT IN AN ORGANIZED HEALTH CARE EDUCATION/TRAINING PROGRAM

## 2024-11-11 PROCEDURE — 99213 OFFICE O/P EST LOW 20 MIN: CPT | Mod: PBBFAC,25 | Performed by: STUDENT IN AN ORGANIZED HEALTH CARE EDUCATION/TRAINING PROGRAM

## 2024-11-11 PROCEDURE — 92133 CPTRZD OPH DX IMG PST SGM ON: CPT | Mod: PBBFAC | Performed by: STUDENT IN AN ORGANIZED HEALTH CARE EDUCATION/TRAINING PROGRAM

## 2024-11-11 PROCEDURE — 99999 PR PBB SHADOW E&M-EST. PATIENT-LVL III: CPT | Mod: PBBFAC,,, | Performed by: STUDENT IN AN ORGANIZED HEALTH CARE EDUCATION/TRAINING PROGRAM

## 2024-11-11 PROCEDURE — 99205 OFFICE O/P NEW HI 60 MIN: CPT | Mod: S$PBB,,, | Performed by: STUDENT IN AN ORGANIZED HEALTH CARE EDUCATION/TRAINING PROGRAM

## 2024-11-11 NOTE — TELEPHONE ENCOUNTER
Called PT today to have them rescheduled there appointment due to the Doctor being out of office.  PT has been Put on a wait list and Has been put on for Feb 6.

## 2024-11-11 NOTE — LETTER
[facility] - [address]  [address]  [facility] [address]  Phone: [phone]  Fax: [phone]   November 11, 2024    Neena Barrera MD  [address]  [facility] [address]    Patient: Trupti Coronado   MR Number: [id number]   YOB: 1960   Date of Visit: 11/11/2024       Dear Dr. Barrera :    Thank you for referring Trupti Coronado to me for evaluation. Here is my assessment and plan of care:    Impression:  Trupti Coronado has history of HTN, B12 deficiency, h/o nahid-en-Y bypass, tremor, left parietal cystic mass, who presents for evaluation of formal visual fields. They report no changes in vision since last visit. Neuro-ophthalmologic examination was notable for good visual acuities, full color vision, normal ocular motility and alignment. OCT with normal and symmetric peripapillary RNFL thicknesses. Formal visual fields were full OD and OS. She has been told in the past that she has a retinal nevus which should have b-scan but she has not yet seen a retina specialist.     We discussed changes which would suggest any change in her cystic parietal mass, but my suspicion for any visual compromise is low.      Plan:  1. Retina eval for reported nevus/nevi  2. Surveillance MRIs as planned per Dr. Ruano  3. Follow up with optometry/ophthalmology for yearly routine eye exams and refraction needs    Follow-up:  I will see her in follow-up in 1 year or sooner with any change.    If you have questions, please do not hesitate to call me. I look forward to following Ms. Trupti Coronado along with you.    Sincerely,        Amber Rivas MD       CC  Austin Ruano MD

## 2024-11-11 NOTE — PROGRESS NOTES
HVF/OCT rel/fix poor OU coop fair OU/chart checked for latex allergy/0 + 0.50 x 11 OD 0 + 0.50 x 001 OS -BJ

## 2024-11-25 ENCOUNTER — TELEPHONE (OUTPATIENT)
Dept: INTERNAL MEDICINE | Facility: CLINIC | Age: 64
End: 2024-11-25
Payer: MEDICARE

## 2024-11-25 DIAGNOSIS — F43.20 ADJUSTMENT DISORDER, UNSPECIFIED TYPE: ICD-10-CM

## 2024-11-25 RX ORDER — BUPROPION HYDROCHLORIDE 150 MG/1
150 TABLET ORAL
Qty: 90 TABLET | Refills: 3 | Status: SHIPPED | OUTPATIENT
Start: 2024-11-25

## 2024-11-25 NOTE — TELEPHONE ENCOUNTER
Care Due:                  Date            Visit Type   Department     Provider  --------------------------------------------------------------------------------                                EP -                              PRIMARY      [facility] INTERNAL  Last Visit: 10-      CARE ([facility])   MEDICINE       Neena Barrera  Next Visit: None Scheduled  None         None Found                                                            Last  Test          Frequency    Reason                     Performed    Due Date  --------------------------------------------------------------------------------    CMP.........  12 months..  hydroCHLOROthiazide......  02- 02-    [facility] Embedded Care Due Messages. Reference number: [id number].   11/25/2024 11:11:07 AM CST

## 2024-11-25 NOTE — TELEPHONE ENCOUNTER
----- Message from Daiana sent at 11/25/2024 11:14 AM CST -----  Regarding: Refill  Type: RX Refill Request    Who Called:PT    RX Name and Strength:buPROPion (WELLBUTRIN XL) 150 MG TB24 tablet    Preferred Pharmacy with phone number:[facility] [address] [place] [address]        Would the patient rather a call back or a response via [facility]?call back     Best Call Back Number:[phone]    Additional Information:

## 2024-11-25 NOTE — TELEPHONE ENCOUNTER
Provider Staff:  Action required for this patient    Requires labs      Please see care gap opportunities below in Care Due Message.    Thanks!  [facility]     Appointments      Date Provider   Last Visit   10/9/2024 Neena Barrera MD   Next Visit   Visit date not found Neena Barrera MD     Refill Decision Note   Trupti Coronado  is requesting a refill authorization.  Brief Assessment and Rationale for Refill:  Approve     Medication Therapy Plan:         Comments:     Note composed:3:05 PM 11/25/2024

## 2024-12-03 ENCOUNTER — OFFICE VISIT (OUTPATIENT)
Dept: OPHTHALMOLOGY | Facility: CLINIC | Age: 64
End: 2024-12-03
Payer: MEDICARE

## 2024-12-03 ENCOUNTER — CLINICAL SUPPORT (OUTPATIENT)
Dept: OPHTHALMOLOGY | Facility: CLINIC | Age: 64
End: 2024-12-03
Payer: MEDICARE

## 2024-12-03 DIAGNOSIS — H43.823 VITREOMACULAR ADHESION, BILATERAL: ICD-10-CM

## 2024-12-03 DIAGNOSIS — H53.15 VISUAL DISTORTIONS OF SHAPE AND SIZE: Primary | ICD-10-CM

## 2024-12-03 DIAGNOSIS — D31.31 CHOROIDAL NEVUS, RIGHT: ICD-10-CM

## 2024-12-03 PROCEDURE — 99213 OFFICE O/P EST LOW 20 MIN: CPT | Mod: PBBFAC,25 | Performed by: OPHTHALMOLOGY

## 2024-12-03 PROCEDURE — 92201 OPSCPY EXTND RTA DRAW UNI/BI: CPT | Mod: S$PBB,,, | Performed by: OPHTHALMOLOGY

## 2024-12-03 PROCEDURE — 92014 COMPRE OPH EXAM EST PT 1/>: CPT | Mod: S$PBB,,, | Performed by: OPHTHALMOLOGY

## 2024-12-03 PROCEDURE — 92134 CPTRZ OPH DX IMG PST SGM RTA: CPT | Mod: PBBFAC | Performed by: OPHTHALMOLOGY

## 2024-12-03 PROCEDURE — 92201 OPSCPY EXTND RTA DRAW UNI/BI: CPT | Mod: PBBFAC | Performed by: OPHTHALMOLOGY

## 2024-12-03 PROCEDURE — 99999 PR PBB SHADOW E&M-EST. PATIENT-LVL III: CPT | Mod: PBBFAC,,, | Performed by: OPHTHALMOLOGY

## 2024-12-03 NOTE — PROGRESS NOTES
HPI     dfe     new patient to retina clinic       Additional comments: New patient to retina clinic   Dfe   No flashes     no floaters   Oct   Ref by DR Rivas  brain cyst  mass (left parietal mass )  V a   distortions  of different  shapes and sizes           Last edited by Kathrine Rodriguez on 12/3/2024  9:58 AM.          OCT VMA OU      A/P    Was told CR nevus at [facility] 5/24  Small flat nevus OD inferior   Very low risk for choroidal melanoma  Does not need FU    2. Early NS OU    3. Left parietal cystic mass  Will follow up yearly with Rob    Can FU with optometry as needed q1-2 years    Retina PRN

## 2024-12-05 ENCOUNTER — HOSPITAL ENCOUNTER (OUTPATIENT)
Dept: RADIOLOGY | Facility: HOSPITAL | Age: 64
Discharge: HOME | End: 2024-12-05
Attending: NEUROLOGICAL SURGERY
Payer: MEDICARE

## 2024-12-05 ENCOUNTER — OFFICE VISIT (OUTPATIENT)
Dept: NEUROSURGERY | Facility: CLINIC | Age: 64
End: 2024-12-05
Payer: MEDICARE

## 2024-12-05 VITALS
BODY MASS INDEX: 22.19 KG/M2 | DIASTOLIC BLOOD PRESSURE: 77 MMHG | WEIGHT: 120.56 LBS | SYSTOLIC BLOOD PRESSURE: 122 MMHG | HEIGHT: 62 IN | HEART RATE: 72 BPM

## 2024-12-05 DIAGNOSIS — I63.89 OTHER CEREBRAL INFARCTION: ICD-10-CM

## 2024-12-05 DIAGNOSIS — G93.0 BRAIN CYST: ICD-10-CM

## 2024-12-05 DIAGNOSIS — I63.89 OTHER CEREBRAL INFARCTION: Primary | ICD-10-CM

## 2024-12-05 PROCEDURE — 25500020 PHARM REV CODE 255: Performed by: NEUROLOGICAL SURGERY

## 2024-12-05 PROCEDURE — A9585 GADOBUTROL INJECTION: HCPCS | Performed by: NEUROLOGICAL SURGERY

## 2024-12-05 PROCEDURE — 99999 PR PBB SHADOW E&M-EST. PATIENT-LVL III: CPT | Mod: PBBFAC,,, | Performed by: NEUROLOGICAL SURGERY

## 2024-12-05 PROCEDURE — 70553 MRI BRAIN STEM W/O & W/DYE: CPT | Mod: TC

## 2024-12-05 PROCEDURE — 99213 OFFICE O/P EST LOW 20 MIN: CPT | Mod: PBBFAC,25 | Performed by: NEUROLOGICAL SURGERY

## 2024-12-05 RX ORDER — GADOBUTROL 604.72 MG/ML
6 INJECTION INTRAVENOUS
Status: COMPLETED | OUTPATIENT
Start: 2024-12-05 | End: 2024-12-05

## 2024-12-05 RX ADMIN — GADOBUTROL 6 ML: 604.72 INJECTION INTRAVENOUS at 09:12

## 2024-12-05 NOTE — PROGRESS NOTES
Neurosurgery  Established Patient    SUBJECTIVE:     History of Present Illness:  Ms. Coronado is a 64-year-old female who was referred to me by Dr. Neena Barrera. Her past medical history is significant for hypertension, peripheral vascular disease, anemia, bariatric surgery, and Plastic surgery. She also has a history of essential tremor. She had a ground level fall over a chair in June of 2024. Injuries from the small included rib fractures and a right hemopneumothorax status post chest tube placement. Due to the trauma, head imaging was done at that time which showed an incidental finding of a brain cyst. Given this, she was referred for neurosurgical follow-up. Currently, she denies headaches, blurry vision, double vision, numbness, tingling, weakness, or seizure-like activity. She was asymptomatic. She does have chronic low back pain which is worse when she was lying down.      Interval History 9/26/2024:  Ms. Coronado is a 64-year-old female who is seeing me today in follow-up.  Her last neurosurgery clinic appointment was on August 15, 2024.  At that time, she was asymptomatic but had an incidental finding of a brain cyst that was discovered after head trauma in June of 2024.  She was here today to see me in follow-up with her imaging studies from that time.  She continues to deny headaches, nausea, vomiting, blurry vision, weakness, paresthesias, or seizure activity.  She continues to be asymptomatic.    Interval History 12/5/2024:  Ms. Coronado is a 64-year-old female me today in follow-up.  Her last neurosurgery clinic appointment was on September 26, 2024.  She had an incidentally found brain cyst that was discovered after head trauma in June 2024.  She was asymptomatic.  This appeared to be an arachnoid cyst but was slightly atypical.  We elected to follow it with interval MRI with and without contrast of the brain.  She was here today to see me in follow-up.  She continues to be asymptomatic.  She  denies headaches, nausea, vomiting, blurry vision, double vision, weakness, or seizure-like activity.    Review of patient's allergies indicates:   Allergen Reactions    Levofloxacin Rash and Hives       Current Outpatient Medications   Medication Sig Dispense Refill    BIOTIN ORAL Take by mouth.      buPROPion (WELLBUTRIN XL) 150 MG TB24 tablet TAKE ONE TABLET BY MOUTH ONCE DAILY 90 tablet 3    calcium citrate (CALCITRATE) 200 mg (950 mg) tablet Take 1 tablet by mouth once daily.      cholecalciferol, vitamin D3, (VITAMIN D3) 2,000 unit Cap Take 1 capsule (2,000 Units total) by mouth once daily.      hydroCHLOROthiazide (HYDRODIURIL) 12.5 MG Tab Take 1 tablet (12.5 mg total) by mouth once daily. 90 tablet 2    multivitamin capsule Take 1 capsule by mouth once daily.      omega-3 fatty acids/fish oil (FISH OIL-OMEGA-3 FATTY ACIDS) 300-1,000 mg capsule Take by mouth once daily.      propranoloL (INDERAL LA) 60 MG 24 hr capsule TAKE ONE CAPSULE BY MOUTH ONCE DAILY 90 capsule 3    syringe, disposable, 3 mL Syrg 1 each by Misc.(Non-Drug; Combo Route) route every 30 days. 22 gauge 25 each 3    TURMERIC ORAL Take by mouth.      valACYclovir (VALTREX) 1000 MG tablet Take 2 pills (2000 mg) BID x 1 day for flares 8 tablet 1     No current facility-administered medications for this visit.       Past Medical History:   Diagnosis Date    History of abnormal cervical Pap smear     History of other genital system and obstetric disorders 10/29/2015 1:13:44 PM    [facility] Historical - Quick Add: History of abnormal pap-No Additional Notes    History of other genital system and obstetric disorders 10/29/2015 1:13:44 PM    [facility] Historical - Quick Add: History of abnormal pap-No Additional Notes    Weight loss      Past Surgical History:   Procedure Laterality Date    ABDOMINOPLASTY      BREAST BIOPSY Left 2019    BREAST RECONSTRUCTION      lift - no implants     SECTION  1999    core needle biopsy of breast  "with u/s guidance      COSMETIC SURGERY      FACIAL COSMETIC SURGERY      GASTRIC BYPASS      HYSTERECTOMY      LIPOSUCTION      TOTAL ABDOMINAL HYSTERECTOMY W/ BILATERAL SALPINGOOPHORECTOMY      TOTAL REDUCTION MAMMOPLASTY Bilateral 2012     Family History       Problem Relation (Age of Onset)    Arthritis Mother    Diabetes Mother, Sister    Heart disease Mother, Father, Brother    Hyperlipidemia Mother, Father    Hypertension Mother, Father, Sister    Macular degeneration Mother    No Known Problems Daughter, Daughter, Brother    Other Sister, Sister    Peripheral vascular disease Father    Stroke Father          Social History     Socioeconomic History    Marital status:    Occupational History    Occupation: nurse   Tobacco Use    Smoking status: Never    Smokeless tobacco: Never   Substance and Sexual Activity    Alcohol use: Yes     Comment: rare    Drug use: No    Sexual activity: Never     Partners: Male     Comment:     Social History Narrative    Plastic surgery nurse    From [place]    Moved back to [place] 2014 and then moved to [facility] July 2017     Social Drivers of Health     Food Insecurity: No Food Insecurity (6/1/2024)    Received from [facility]    Hunger Vital Sign     Worried About Running Out of Food in the Last Year: Never true     Ran Out of Food in the Last Year: Never true   Transportation Needs: No Transportation Needs (6/1/2024)    Received from [facility]    [facility] - Transportation     Lack of Transportation (Medical): No     Lack of Transportation (Non-Medical): No       Review of Systems    OBJECTIVE:     Vital Signs  Pulse: 72  BP: 122/77  Pain Score: 0-No pain  Height: 5' 2" (157.5 cm)  Weight: 54.7 kg (120 lb 9.5 oz)  Body mass index is 22.06 kg/m².    Physical Exam:  Vitals reviewed.    Constitutional: She appears well-developed and well-nourished. No distress.     Eyes: Pupils are equal, round, and reactive to light. Conjunctivae and EOM are normal. "     Cardiovascular: Normal rate, regular rhythm, normal pulses and no edema.     Abdominal: Soft. Bowel sounds are normal.     Skin: Skin displays no rash on trunk and no rash on extremities. Skin displays no lesions on trunk and no lesions on extremities.     Psych/Behavior: She is alert. She is oriented to person, place, and time. She has a normal mood and affect.     Musculoskeletal: Gait is normal.        Neck: Range of motion is full. There is no tenderness. Muscle strength is 5/5. Tone is normal.        Back: Range of motion is full. There is no tenderness. Muscle strength is 5/5. Tone is normal.        Right Upper Extremities: Range of motion is full. There is no tenderness. Muscle strength is 5/5. Tone is normal.        Left Upper Extremities: Range of motion is full. There is no tenderness. Muscle strength is 5/5. Tone is normal.       Right Lower Extremities: Range of motion is full. There is no tenderness. Muscle strength is 5/5. Tone is normal.        Left Lower Extremities: Range of motion is full. There is no tenderness. Muscle strength is 5/5. Tone is normal.     Neurological:        Coordination: She has a normal Romberg Test, normal finger to nose coordination and normal tandem walking coordination.        Sensory: There is no sensory deficit in the trunk. There is no sensory deficit in the extremities.        DTRs: DTRs are DTRS NORMAL AND SYMMETRICnormal and symmetric. She displays no Babinski's sign on the right side. She displays no Babinski's sign on the left side.        Cranial nerves: Cranial nerve(s) II, III, IV, V, VI, VII, VIII, IX, X, XI and XII are intact.         Diagnostic Results:  She has an MRI with and without contrast of the brain available for review which I personally reviewed.  This redemonstrates the known large left parieto-occipital cyst.  There is no surrounding enhancement.  It measures approximately 6.7 cm in maximum size.  This is unchanged when compared with her prior  studies.  There is mass effect on the adjacent brain but no significant edema brain.    ASSESSMENT/PLAN:     Ms. 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  This remains unchanged from her last MRI from 6 months ago.  She remains asymptomatic at this time.  We will continue to follow it conservatively.  I have recommended a repeat MRI with and without contrast brain in 6 months' time.  If that is stable, we will push follow-up out to yearly.  I will see her back in 6 months to review the updated MRI.  She knows she can call with any questions or concerns at that time.        Note dictated with voice recognition software, please excuse any grammatical errors.

## 2024-12-30 NOTE — PROGRESS NOTES
Name: Trupti Coronado  MRN: [id number]   CSN: [id number]      Date: 12/30/2024    Referring physician:  No referring provider defined for this encounter.    Chief Complaint: tremors     Interval History:  - had a bad fall in May 2024   - she was carrying a chair and broke 6 ribs, had hemo-pneumothorax  - went to [facility], was there for 5 days   - she was hurried while walking and went down a hallway too fast and fell into the chair   - immediately couldn't breath   - following with nsgy now for brain cyst   - tremor comes and goes, sometimes worse than others   - propranolol 60 mg XR once daily   - if she starts to think about doing something and focuses and then tremor worsens       MRI from Dec 2024  FINDINGS:  There is a large cyst overlying the posterior paramedian margin of the left cerebral hemisphere, centered around the parieto-occipital sulcus.  This not entirely unclear whether this lesion is intra-axial or extra-axial.  Appears to be clear rim of surrounding cortex on several of the images favoring an extra-axial location.  The lesion follows simple appearing fluid on all pulse sequences including FLAIR.  There is no suspicious enhancement.  Lesion similar in size and configuration of the prior, again measuring up to 6.7 cm in maximal transverse dimension.  There is associated mass effect on the left cerebral hemisphere, but no associated edema.       From Sept 2024 with NSGY     History of Present Illness:  Ms. Coronado is a 64-year-old female who was referred to me by Dr. Neena Barrera. Her past medical history is significant for hypertension, peripheral vascular disease, anemia, bariatric surgery, and Plastic surgery. She also has a history of essential tremor. She had a ground level fall over a chair in June of 2024. Injuries from the small included rib fractures and a right hemopneumothorax status post chest tube placement. Due to the trauma, head imaging was done at that time which showed an incidental  finding of a brain cyst. Given this, she was referred for neurosurgical follow-up. Currently, she denies headaches, blurry vision, double vision, numbness, tingling, weakness, or seizure-like activity. She was asymptomatic. She does have chronic low back pain which is worse when she was lying down.      Interval History 9/26/2024:  Ms. Coronado is a female who is seeing me today in follow-up.  Her last neurosurgery clinic appointment was on August 15, 2024.  At that time, she was asymptomatic but had an incidental finding of a brain cyst that was discovered after head trauma in June of 2024.  She was here today to see me in follow-up with her imaging studies from that time.  She continues to deny headaches, nausea, vomiting, blurry vision, weakness, paresthesias, or seizure activity.  She continues to be asymptomatic.    Dec 2021  - trial of propranolol last visit   - helping with tremor   - sometimes forgets all three doses   - feels she would benefit from higher dose   - no side effects         From Oct 2021: Trupti Coronado is a R HANDED 64 y.o. female with a medical issues significant for essential tremor, B12 deficiency, iron deficiency, and obesity who presents for tremors. Saw Dr. Riley in the past, told her she had ET, didn't start meds because of side effects. Started having tremors in 2012, not sure if it started in one hand first, but noticed it in both hands. Noticed it whenever doing fine motor skills. Writing was the biggest issue. Noticed it with giving injections. Now she cant even put eye make up. Has noticed the tremor at rest, tremor is equal on both sides. Has never tried any tremor medications. States that she has never had good balance her entire life, hasn't worsened. She is more careful when going up and down stairs. She is able to stand on R foot but cannot stand on L foot by itself. No falls. Sometimes drops things out of her hands.     Has not paid attention as to whether tremor improves  with EtOH. No history of kidney stones.     Retired nurse.     Family History: half-sister has tremors, has gone back and forth between PDism and ET (same mother)     Neuroleptic Exposure: none     From Jan 2018 with Dr. Riley  This is a 57-year-old female who had been seen by me with complaints an intermittent tremor affecting the hands, usually noted with use of her hands especially with writing.  She has no tremor at rest.  Symptoms started after she had gastric bypass surgery in 2012.  She was living in [place] that time and lost a lot of weight dropped down to around 130 pounds.  In addition she was noted to have subsequent low vitamin D and B12 levels requiring supplementation.  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  Her tremors are intermittent and not present all the time and do not interfere with her functioning though she has occasional difficulty with writing.  Stress can be a triggering factor.     She has a half-sister who is in her 70s and has early Parkinsons.  Sleep and appetite is good.  She has no history of anxiety or depression.    Nonmotor/Premotor ROS:  Anosmia: exceptional   Dysarthria/Hypophonia: none   Dysphagia/Sialorrhea: none   Depression: situational due to pandemic   Cognitive slowing: long term intact, having some issues remembering names, usually with people she doesn't know very well   Urinary changes: some frequency and urgency   Constipation: none   Falls: none   Freezing: none   Micrographia:  None   Sleep issues:  -CRESENCIO: none   -RBD: none       Review of Systems:   Review of Systems   Constitutional:  Negative for chills, fever and malaise/fatigue.   HENT:  Negative for hearing loss.    Eyes:  Negative for blurred vision and double vision.   Respiratory:  Negative for cough, shortness of breath and stridor.     Cardiovascular:  Negative for chest pain and leg swelling.   Gastrointestinal:  Negative for constipation, diarrhea and nausea.   Genitourinary:  Negative for frequency and urgency.   Musculoskeletal:  Negative for falls.   Skin:  Negative for itching and rash.   Neurological:  Positive for tremors. Negative for dizziness, loss of consciousness and weakness.   Psychiatric/Behavioral:  Negative for hallucinations and memory loss.            Past Medical History: The patient  has a past medical history of History of abnormal cervical Pap smear, History of other genital system and obstetric disorders (10/29/2015 1:13:44 PM), History of other genital system and obstetric disorders (10/29/2015 1:13:44 PM), and Weight loss.    Social History: The patient  reports that she has never smoked. She has never used smokeless tobacco. She reports current alcohol use. She reports that she does not use drugs.    Family History: Their family history includes Arthritis in her mother; Diabetes in her mother and sister; Heart disease in her brother, father, and mother; Hyperlipidemia in her father and mother; Hypertension in her father, mother, and sister; Macular degeneration in her mother; No Known Problems in her brother, daughter, and daughter; Other in her sister and sister; Peripheral vascular disease in her father; Stroke in her father.    Allergies: Levofloxacin     Meds:   Current Outpatient Medications on File Prior to Visit   Medication Sig Dispense Refill    BIOTIN ORAL Take by mouth.      buPROPion (WELLBUTRIN XL) 150 MG TB24 tablet TAKE ONE TABLET BY MOUTH ONCE DAILY 90 tablet 3    calcium citrate (CALCITRATE) 200 mg (950 mg) tablet Take 1 tablet by mouth once daily.      cholecalciferol, vitamin D3, (VITAMIN D3) 2,000 unit Cap Take 1 capsule (2,000 Units total) by mouth once daily.      hydroCHLOROthiazide (HYDRODIURIL) 12.5 MG Tab Take 1 tablet (12.5 mg total) by mouth once daily. 90 tablet 2    multivitamin capsule  Take 1 capsule by mouth once daily.      omega-3 fatty acids/fish oil (FISH OIL-OMEGA-3 FATTY ACIDS) 300-1,000 mg capsule Take by mouth once daily.      propranoloL (INDERAL LA) 60 MG 24 hr capsule TAKE ONE CAPSULE BY MOUTH ONCE DAILY 90 capsule 3    syringe, disposable, 3 mL Syrg 1 each by Misc.(Non-Drug; Combo Route) route every 30 days. 22 gauge 25 each 3    TURMERIC ORAL Take by mouth.      valACYclovir (VALTREX) 1000 MG tablet Take 2 pills (2000 mg) BID x 1 day for flares 8 tablet 1     No current facility-administered medications on file prior to visit.       Exam:  There were no vitals taken for this visit.    Constitutional  Well-developed, well-nourished, appears stated age   Ophthalmoscopic  No papilledema with no hemorrhages or exudates bilaterally   Cardiovascular  Radial pulses 2+ and symmetric, no LE edema bilaterally   Neurological    * Mental status  MOCA =      - Orientation  Oriented to person, place, time, and situation     - Memory   Intact recent and remote     - Attention/concentration  Attentive, vigilant during exam     - Language  Naming & repetition intact, +2-step commands     - Fund of knowledge  Aware of current events     - Executive  Well-organized thoughts     - Other     * Cranial nerves       - CN II  PERRL, visual fields full to confrontation     - CN III, IV, VI  Extraocular movements full, normal pursuits and saccades     - CN V  Sensation V1 - V3 intact     - CN VII  Face strong and symmetric bilaterally     - CN VIII  Hearing intact bilaterally     - CN IX, X  Palate raises midline and symmetric     - CN XI  SCM and trapezius 5/5 bilaterally     - CN XII  Tongue midline   * Motor  Muscle bulk normal, strength 5/5 throughout   * Sensory   Intact to temperature and vibration throughout   * Coordination  No dysmetria with finger-to-nose or heel-to-shin   * Gait  See below.   * Deep tendon reflexes  3+ and symmetric throughout    huitron present bilaterally, R > L     jaw jerk  present but not abnormal    Babinski downgoing bilaterally   * Specialized movement exam  No hypophonic speech.    No facial masking.   No cogwheel rigidity.     mild left bradykinesia with toe taps   mild right bradykinesia with heel taps    increased tone in LLE    No other dystonia, chorea, athetosis, myoclonus, or tics.   No motor impersistence.   Normal-based gait.   No shortened stride length.   No abnormal arm swing.     No postural instability.      fine, high frequency tremor at rest    bilateral postural tremor      Laboratory/Radiological:  - Results:  No visits with results within 3 Month(s) from this visit.   Latest known visit with results is:   Lab Visit on 02/27/2024   Component Date Value Ref Range Status    Hemoglobin A1C 02/27/2024 4.9  4.0 - 5.6 % Final    Estimated Avg Glucose 02/27/2024 94  68 - 131 mg/dL Final    TSH 02/27/2024 1.577  0.400 - 4.000 uIU/mL Final    Cholesterol 02/27/2024 208 (H)  120 - 199 mg/dL Final    Triglycerides 02/27/2024 107  30 - 150 mg/dL Final    HDL 02/27/2024 61  40 - 75 mg/dL Final    LDL Cholesterol 02/27/2024 125.6  63.0 - 159.0 mg/dL Final    HDL/Cholesterol Ratio 02/27/2024 29.3  20.0 - 50.0 % Final    Total Cholesterol/HDL Ratio 02/27/2024 3.4  2.0 - 5.0 Final    Non-HDL Cholesterol 02/27/2024 147  mg/dL Final    WBC 02/27/2024 7.68  3.90 - 12.70 K/uL Final    RBC 02/27/2024 4.86  4.00 - 5.40 M/uL Final    Hemoglobin 02/27/2024 14.2  12.0 - 16.0 g/dL Final    Hematocrit 02/27/2024 43.7  37.0 - 48.5 % Final    MCV 02/27/2024 90  82 - 98 fL Final    MCH 02/27/2024 29.2  27.0 - 31.0 pg Final    MCHC 02/27/2024 32.5  32.0 - 36.0 g/dL Final    RDW 02/27/2024 12.6  11.5 - 14.5 % Final    Platelets 02/27/2024 288  150 - 450 K/uL Final    MPV 02/27/2024 10.3  9.2 - 12.9 fL Final    Immature Granulocytes 02/27/2024 0.5  0.0 - 0.5 % Final    Gran # (ANC) 02/27/2024 4.1  1.8 - 7.7 K/uL Final    Immature Grans (Abs) 02/27/2024 0.04  0.00 - 0.04 K/uL Final    Lymph #  02/27/2024 3.0  1.0 - 4.8 K/uL Final    Mono # 02/27/2024 0.5  0.3 - 1.0 K/uL Final    Eos # 02/27/2024 0.0  0.0 - 0.5 K/uL Final    Baso # 02/27/2024 0.04  0.00 - 0.20 K/uL Final    nRBC 02/27/2024 0  0 /100 WBC Final    Gran % 02/27/2024 52.8  38.0 - 73.0 % Final    Lymph % 02/27/2024 39.6  18.0 - 48.0 % Final    Mono % 02/27/2024 6.1  4.0 - 15.0 % Final    Eosinophil % 02/27/2024 0.5  0.0 - 8.0 % Final    Basophil % 02/27/2024 0.5  0.0 - 1.9 % Final    Differential Method 02/27/2024 Automated   Final    Sodium 02/27/2024 136  136 - 145 mmol/L Final    Potassium 02/27/2024 3.7  3.5 - 5.1 mmol/L Final    Chloride 02/27/2024 98  95 - 110 mmol/L Final    CO2 02/27/2024 29  23 - 29 mmol/L Final    Glucose 02/27/2024 91  70 - 110 mg/dL Final    BUN 02/27/2024 11  8 - 23 mg/dL Final    Creatinine 02/27/2024 0.8  0.5 - 1.4 mg/dL Final    Calcium 02/27/2024 9.7  8.7 - 10.5 mg/dL Final    Total Protein 02/27/2024 6.9  6.0 - 8.4 g/dL Final    Albumin 02/27/2024 4.2  3.5 - 5.2 g/dL Final    Total Bilirubin 02/27/2024 0.7  0.1 - 1.0 mg/dL Final    Alkaline Phosphatase 02/27/2024 46 (L)  55 - 135 U/L Final    AST 02/27/2024 22  10 - 40 U/L Final    ALT 02/27/2024 19  10 - 44 U/L Final    eGFR 02/27/2024 >60  >60 mL/min/1.73 m^2 Final    Anion Gap 02/27/2024 9  8 - 16 mmol/L Final    Zinc 02/27/2024 85  60 - 130 ug/dL Final    Ferritin 02/27/2024 135  20.0 - 300.0 ng/mL Final    Iron 02/27/2024 100  30 - 160 ug/dL Final    Transferrin 02/27/2024 263  200 - 375 mg/dL Final    TIBC 02/27/2024 389  250 - 450 ug/dL Final    Saturated Iron 02/27/2024 26  20 - 50 % Final    Vit D, 25-Hydroxy 02/27/2024 67  30 - 96 ng/mL Final    Thiamine 02/27/2024 82  38 - 122 ug/L Final    Vitamin B-12 02/27/2024 860  210 - 950 pg/mL Final       - Independent review of images:     Brain MRI from 12/2024  There is a large cyst overlying the posterior paramedian margin of the left cerebral hemisphere, centered around the parieto-occipital sulcus.   This not entirely unclear whether this lesion is intra-axial or extra-axial.  Appears to be clear rim of surrounding cortex on several of the images favoring an extra-axial location.  The lesion follows simple appearing fluid on all pulse sequences including FLAIR.  There is no suspicious enhancement.  Lesion similar in size and configuration of the prior, again measuring up to 6.7 cm in maximal transverse dimension.  There is associated mass effect on the left cerebral hemisphere, but no associated edema.     No additional extra-axial fluid collections, hemorrhage or enhancing mass elsewhere.     Few scattered punctate foci of T2/FLAIR hyperintensity in the supratentorial white matter, nonspecific but most likely reflecting chronic small vessel ischemic changes. No recent or remote major vascular distribution infarct. No recent or remote hemorrhage.  No mass effect or midline shift.  No abnormal post-contrast parenchymal or leptomeningeal enhancement.       C spine MRI   Disc herniation/protrusion posteriorly and eccentric to the left with an associated annular tear at theC7-T1 level with mild narrowing of the central spinal canal.Annular disc bulges posteriorly at the C5-6 and C6-7 levels.Moderate narrowing of the neural foramen bilaterally at the C5-6 leve    - Independent review of consultant's notes: Dr. Barrera, Dr. Dhillon    Diagnoses:              Assessment//Plan:   Essential tremor  ET  Postural tremor  Propranolol 60 mg XR -- would not increase at this time, HR 60  - refilling today   - discussed addition of primidone, hold off for now  - tremor comes and goes     2. Brain cyst  - follows with Dr. Dhillon  - continue to monitor           Follow up: in 1 year      This is a patient with a chronic neurologic diagnosis whose overall, ongoing care is being managed and monitored by me and our Neurology clinic.   As such, since 2024,  is the appropriate add-on code to accompany the other E/M billing for this  visit.        Collaborating Physician, Dr. Padilla, was available during today's encounter. Any change to plan along with cosign to appear in the EMR.              Amber Colin PA-C   [facility]  Department of Neurology  Movement Disorders

## 2024-12-31 ENCOUNTER — OFFICE VISIT (OUTPATIENT)
Dept: NEUROLOGY | Facility: CLINIC | Age: 64
End: 2024-12-31
Payer: MEDICARE

## 2024-12-31 VITALS
HEART RATE: 60 BPM | DIASTOLIC BLOOD PRESSURE: 79 MMHG | SYSTOLIC BLOOD PRESSURE: 118 MMHG | WEIGHT: 120 LBS | BODY MASS INDEX: 21.95 KG/M2

## 2024-12-31 DIAGNOSIS — R29.2: ICD-10-CM

## 2024-12-31 DIAGNOSIS — R25.1 TREMOR: ICD-10-CM

## 2024-12-31 DIAGNOSIS — Z71.89 COUNSELING REGARDING GOALS OF CARE: ICD-10-CM

## 2024-12-31 DIAGNOSIS — G25.0 TREMOR, ESSENTIAL: Primary | ICD-10-CM

## 2024-12-31 DIAGNOSIS — I10 HYPERTENSION, BENIGN: ICD-10-CM

## 2024-12-31 DIAGNOSIS — G93.0 BRAIN CYST: ICD-10-CM

## 2024-12-31 PROCEDURE — 99204 OFFICE O/P NEW MOD 45 MIN: CPT | Mod: S$PBB,,, | Performed by: PHYSICIAN ASSISTANT

## 2024-12-31 PROCEDURE — G2211 COMPLEX E/M VISIT ADD ON: HCPCS | Mod: S$PBB,,, | Performed by: PHYSICIAN ASSISTANT

## 2024-12-31 PROCEDURE — 99999 PR PBB SHADOW E&M-EST. PATIENT-LVL III: CPT | Mod: PBBFAC,,, | Performed by: PHYSICIAN ASSISTANT

## 2024-12-31 PROCEDURE — 99213 OFFICE O/P EST LOW 20 MIN: CPT | Mod: PBBFAC | Performed by: PHYSICIAN ASSISTANT

## 2024-12-31 RX ORDER — PROPRANOLOL HYDROCHLORIDE 60 MG/1
60 CAPSULE, EXTENDED RELEASE ORAL DAILY
Qty: 90 CAPSULE | Refills: 3 | Status: SHIPPED | OUTPATIENT
Start: 2024-12-31

## 2025-02-22 DIAGNOSIS — Z00.00 ENCOUNTER FOR MEDICARE ANNUAL WELLNESS EXAM: ICD-10-CM

## 2025-03-18 ENCOUNTER — TELEPHONE (OUTPATIENT)
Dept: INTERNAL MEDICINE | Facility: CLINIC | Age: 65
End: 2025-03-18
Payer: MEDICARE

## 2025-03-18 NOTE — TELEPHONE ENCOUNTER
----- Message from Aviva sent at 3/17/2025 11:31 AM CDT -----  Who called:self  What is the request in detail:pt would like for you to give her a call  Can the clinic reply by [facility]? Would the patient rather a call back or a response via [facility]?callback  Best call back number:[phone] Additional Information:

## 2025-07-15 DIAGNOSIS — I10 HYPERTENSION, BENIGN: ICD-10-CM

## 2025-07-15 RX ORDER — HYDROCHLOROTHIAZIDE 12.5 MG/1
12.5 TABLET ORAL DAILY
Qty: 90 TABLET | Refills: 1 | Status: SHIPPED | OUTPATIENT
Start: 2025-07-15

## 2025-07-15 NOTE — TELEPHONE ENCOUNTER
Refill Encounter    PCP Visits: Recent Visits  Date Type Provider Dept   10/09/24 Office Visit Neena Barrera MD [facility]   08/21/24 Office Visit Neena Barrera MD [facility]   Showing recent visits within past 360 days and meeting all other requirements  Future Appointments  Date Type Provider Dept   10/02/25 Appointment Neena Barrera MD [facility]   Showing future appointments within next 720 days and meeting all other requirements      Last 3 Blood Pressure:   BP Readings from Last 3 Encounters:   12/31/24 118/79   12/05/24 122/77   10/09/24 102/74     Preferred Pharmacy:   [address] [address]  Phone: [phone] Fax: [phone]    Requested RX:  Requested Prescriptions      No prescriptions requested or ordered in this encounter      RX Route: Normal

## 2025-07-15 NOTE — TELEPHONE ENCOUNTER
Copied from CRM #[id number]. Topic: Medications - Medication Refill  >> Jul 15, 2025 11:45 AM Lisa wrote:  Type: RX REFILL REQUEST    Who Called:Patient    Refill or New Rx:Refill    Rx Name and Strength: hydroCHLOROthiazide 12.5 MG Tab    Pt stated that the pharmacy hasn't heard back from the provider regarding her refill, pt stated that she only has about a day left of the medication.    Would the patient rather a call back or a response via [facility]? Call    Best Call Back Number: [phone]      Additional Information:    [address] [address]  Phone: [phone] Fax: [phone]

## 2025-07-15 NOTE — TELEPHONE ENCOUNTER
Care Due:                  Date            Visit Type   Department     Provider  --------------------------------------------------------------------------------                                EP -                              PRIMARY      [facility] INTERNAL  Last Visit: 10-      CARE ([facility])   MEDICINE       Neena Barrera                              EP -                              PRIMARY      [facility] INTERNAL  Next Visit: 10-      CARE ([facility])   MEDICINE       Neena Barrera                                                            Last  Test          Frequency    Reason                     Performed    Due Date  --------------------------------------------------------------------------------    CMP.........  12 months..  hydroCHLOROthiazide......  02- 02-    [facility] Embedded Care Due Messages. Reference number: [id number].   7/15/2025 11:57:35 AM CDT

## 2025-08-25 ENCOUNTER — LAB VISIT (OUTPATIENT)
Dept: LAB | Facility: OTHER | Age: 65
End: 2025-08-25
Attending: INTERNAL MEDICINE
Payer: MEDICARE

## 2025-08-25 ENCOUNTER — OFFICE VISIT (OUTPATIENT)
Dept: INTERNAL MEDICINE | Facility: CLINIC | Age: 65
End: 2025-08-25
Attending: INTERNAL MEDICINE
Payer: MEDICARE

## 2025-08-25 VITALS
HEIGHT: 62 IN | OXYGEN SATURATION: 99 % | BODY MASS INDEX: 23.65 KG/M2 | SYSTOLIC BLOOD PRESSURE: 112 MMHG | WEIGHT: 128.5 LBS | HEART RATE: 62 BPM | DIASTOLIC BLOOD PRESSURE: 80 MMHG

## 2025-08-25 DIAGNOSIS — I10 ESSENTIAL HYPERTENSION: ICD-10-CM

## 2025-08-25 DIAGNOSIS — R63.4 WEIGHT LOSS: ICD-10-CM

## 2025-08-25 DIAGNOSIS — G93.0 BRAIN CYST: Primary | ICD-10-CM

## 2025-08-25 DIAGNOSIS — I10 HYPERTENSION, BENIGN: ICD-10-CM

## 2025-08-25 DIAGNOSIS — Z13.6 ENCOUNTER FOR SCREENING FOR CARDIOVASCULAR DISORDERS: ICD-10-CM

## 2025-08-25 DIAGNOSIS — M81.6 LOCALIZED OSTEOPOROSIS, UNSPECIFIED PATHOLOGICAL FRACTURE PRESENCE: ICD-10-CM

## 2025-08-25 DIAGNOSIS — R73.01 IFG (IMPAIRED FASTING GLUCOSE): ICD-10-CM

## 2025-08-25 DIAGNOSIS — E53.8 B12 DEFICIENCY: ICD-10-CM

## 2025-08-25 DIAGNOSIS — G25.0 TREMOR, ESSENTIAL: ICD-10-CM

## 2025-08-25 DIAGNOSIS — Z12.31 ENCOUNTER FOR SCREENING MAMMOGRAM FOR MALIGNANT NEOPLASM OF BREAST: ICD-10-CM

## 2025-08-25 DIAGNOSIS — Z98.84 HISTORY OF ROUX-EN-Y GASTRIC BYPASS: ICD-10-CM

## 2025-08-25 DIAGNOSIS — N95.9 MENOPAUSAL PROBLEM: ICD-10-CM

## 2025-08-25 DIAGNOSIS — D50.9 IRON DEFICIENCY ANEMIA, UNSPECIFIED IRON DEFICIENCY ANEMIA TYPE: ICD-10-CM

## 2025-08-25 DIAGNOSIS — F43.20 ADJUSTMENT DISORDER, UNSPECIFIED TYPE: ICD-10-CM

## 2025-08-25 DIAGNOSIS — E61.1 IRON DEFICIENCY: ICD-10-CM

## 2025-08-25 DIAGNOSIS — F43.21 ADJUSTMENT DISORDER WITH DEPRESSED MOOD: ICD-10-CM

## 2025-08-25 DIAGNOSIS — E55.9 VITAMIN D DEFICIENCY: ICD-10-CM

## 2025-08-25 LAB
25(OH)D3+25(OH)D2 SERPL-MCNC: 51 NG/ML (ref 30–96)
ABSOLUTE EOSINOPHIL (OHS): 0.03 K/UL
ABSOLUTE MONOCYTE (OHS): 0.49 K/UL (ref 0.3–1)
ABSOLUTE NEUTROPHIL COUNT (OHS): 3.51 K/UL (ref 1.8–7.7)
ALBUMIN SERPL BCP-MCNC: 4.3 G/DL (ref 3.5–5.2)
ALP SERPL-CCNC: 45 UNIT/L (ref 40–150)
ALT SERPL W/O P-5'-P-CCNC: 33 UNIT/L (ref 10–44)
ANION GAP (OHS): 8 MMOL/L (ref 8–16)
AST SERPL-CCNC: 33 UNIT/L (ref 11–45)
BASOPHILS # BLD AUTO: 0.03 K/UL
BASOPHILS NFR BLD AUTO: 0.5 %
BILIRUB SERPL-MCNC: 0.4 MG/DL (ref 0.1–1)
BUN SERPL-MCNC: 12 MG/DL (ref 8–23)
CALCIUM SERPL-MCNC: 9.2 MG/DL (ref 8.7–10.5)
CHLORIDE SERPL-SCNC: 103 MMOL/L (ref 95–110)
CHOLEST SERPL-MCNC: 206 MG/DL (ref 120–199)
CHOLEST/HDLC SERPL: 3 {RATIO} (ref 2–5)
CO2 SERPL-SCNC: 29 MMOL/L (ref 23–29)
CREAT SERPL-MCNC: 0.7 MG/DL (ref 0.5–1.4)
EAG (OHS): 94 MG/DL (ref 68–131)
ERYTHROCYTE [DISTWIDTH] IN BLOOD BY AUTOMATED COUNT: 12.1 % (ref 11.5–14.5)
FERRITIN SERPL-MCNC: 73 NG/ML (ref 20–300)
GFR SERPLBLD CREATININE-BSD FMLA CKD-EPI: >60 ML/MIN/1.73/M2
GLUCOSE SERPL-MCNC: 84 MG/DL (ref 70–110)
HBA1C MFR BLD: 4.9 % (ref 4–5.6)
HCT VFR BLD AUTO: 40.5 % (ref 37–48.5)
HDLC SERPL-MCNC: 69 MG/DL (ref 40–75)
HDLC SERPL: 33.5 % (ref 20–50)
HGB BLD-MCNC: 13.2 GM/DL (ref 12–16)
IMM GRANULOCYTES # BLD AUTO: 0.04 K/UL (ref 0–0.04)
IMM GRANULOCYTES NFR BLD AUTO: 0.6 % (ref 0–0.5)
IRON SATN MFR SERPL: 19 % (ref 20–50)
IRON SERPL-MCNC: 81 UG/DL (ref 30–160)
LDLC SERPL CALC-MCNC: 117.8 MG/DL (ref 63–159)
LYMPHOCYTES # BLD AUTO: 2.48 K/UL (ref 1–4.8)
MCH RBC QN AUTO: 29.9 PG (ref 27–31)
MCHC RBC AUTO-ENTMCNC: 32.6 G/DL (ref 32–36)
MCV RBC AUTO: 92 FL (ref 82–98)
NONHDLC SERPL-MCNC: 137 MG/DL
NUCLEATED RBC (/100WBC) (OHS): 0 /100 WBC
PLATELET # BLD AUTO: 224 K/UL (ref 150–450)
PMV BLD AUTO: 9.8 FL (ref 9.2–12.9)
POTASSIUM SERPL-SCNC: 4.1 MMOL/L (ref 3.5–5.1)
PROT SERPL-MCNC: 6.7 GM/DL (ref 6–8.4)
RBC # BLD AUTO: 4.41 M/UL (ref 4–5.4)
RELATIVE EOSINOPHIL (OHS): 0.5 %
RELATIVE LYMPHOCYTE (OHS): 37.7 % (ref 18–48)
RELATIVE MONOCYTE (OHS): 7.4 % (ref 4–15)
RELATIVE NEUTROPHIL (OHS): 53.3 % (ref 38–73)
SODIUM SERPL-SCNC: 140 MMOL/L (ref 136–145)
TIBC SERPL-MCNC: 417 UG/DL (ref 250–450)
TRANSFERRIN SERPL-MCNC: 282 MG/DL (ref 200–375)
TRIGL SERPL-MCNC: 96 MG/DL (ref 30–150)
TSH SERPL-ACNC: 1.11 UIU/ML (ref 0.4–4)
VIT B12 SERPL-MCNC: 958 PG/ML (ref 210–950)
WBC # BLD AUTO: 6.58 K/UL (ref 3.9–12.7)

## 2025-08-25 PROCEDURE — 80053 COMPREHEN METABOLIC PANEL: CPT

## 2025-08-25 PROCEDURE — 36415 COLL VENOUS BLD VENIPUNCTURE: CPT

## 2025-08-25 PROCEDURE — 83036 HEMOGLOBIN GLYCOSYLATED A1C: CPT

## 2025-08-25 PROCEDURE — 84443 ASSAY THYROID STIM HORMONE: CPT

## 2025-08-25 PROCEDURE — 99215 OFFICE O/P EST HI 40 MIN: CPT | Mod: PBBFAC | Performed by: INTERNAL MEDICINE

## 2025-08-25 PROCEDURE — 99999 PR PBB SHADOW E&M-EST. PATIENT-LVL V: CPT | Mod: PBBFAC,,, | Performed by: INTERNAL MEDICINE

## 2025-08-25 PROCEDURE — 82728 ASSAY OF FERRITIN: CPT

## 2025-08-25 PROCEDURE — 82306 VITAMIN D 25 HYDROXY: CPT

## 2025-08-25 PROCEDURE — 84425 ASSAY OF VITAMIN B-1: CPT

## 2025-08-25 PROCEDURE — 85025 COMPLETE CBC W/AUTO DIFF WBC: CPT

## 2025-08-25 PROCEDURE — 84630 ASSAY OF ZINC: CPT

## 2025-08-25 PROCEDURE — 84466 ASSAY OF TRANSFERRIN: CPT

## 2025-08-25 PROCEDURE — 82607 VITAMIN B-12: CPT

## 2025-08-25 PROCEDURE — 80061 LIPID PANEL: CPT

## 2025-08-25 RX ORDER — BUPROPION HYDROCHLORIDE 150 MG/1
150 TABLET ORAL DAILY
Qty: 90 TABLET | Refills: 3 | Status: SHIPPED | OUTPATIENT
Start: 2025-08-25

## 2025-08-25 RX ORDER — HYDROCHLOROTHIAZIDE 12.5 MG/1
12.5 TABLET ORAL DAILY
Qty: 90 TABLET | Refills: 3 | Status: SHIPPED | OUTPATIENT
Start: 2025-08-25

## 2025-08-26 ENCOUNTER — HOSPITAL ENCOUNTER (OUTPATIENT)
Dept: RADIOLOGY | Facility: OTHER | Age: 65
Discharge: HOME | End: 2025-08-26
Attending: INTERNAL MEDICINE
Payer: MEDICARE

## 2025-08-26 VITALS — BODY MASS INDEX: 23.55 KG/M2 | HEIGHT: 62 IN | WEIGHT: 128 LBS

## 2025-08-26 DIAGNOSIS — Z12.31 ENCOUNTER FOR SCREENING MAMMOGRAM FOR MALIGNANT NEOPLASM OF BREAST: ICD-10-CM

## 2025-08-26 PROCEDURE — 77067 SCR MAMMO BI INCL CAD: CPT | Mod: TC

## 2025-08-28 LAB — W ZINC: 84 UG/DL

## 2025-08-30 LAB — W VITAMIN B1: 70 UG/L

## 2025-09-03 ENCOUNTER — TELEPHONE (OUTPATIENT)
Dept: OPHTHALMOLOGY | Facility: CLINIC | Age: 65
End: 2025-09-03
Payer: MEDICARE